# Patient Record
Sex: MALE | URBAN - METROPOLITAN AREA
[De-identification: names, ages, dates, MRNs, and addresses within clinical notes are randomized per-mention and may not be internally consistent; named-entity substitution may affect disease eponyms.]

---

## 2017-01-19 ENCOUNTER — OFFICE VISIT (OUTPATIENT)
Dept: FAMILY MEDICINE CLINIC | Age: 67
End: 2017-01-19

## 2017-01-19 VITALS
DIASTOLIC BLOOD PRESSURE: 80 MMHG | BODY MASS INDEX: 28.03 KG/M2 | HEIGHT: 66 IN | HEART RATE: 60 BPM | WEIGHT: 174.4 LBS | SYSTOLIC BLOOD PRESSURE: 140 MMHG | OXYGEN SATURATION: 96 % | TEMPERATURE: 97.1 F | RESPIRATION RATE: 16 BRPM

## 2017-01-19 DIAGNOSIS — E11.65 TYPE 2 DIABETES MELLITUS WITH HYPERGLYCEMIA, WITHOUT LONG-TERM CURRENT USE OF INSULIN (HCC): Primary | ICD-10-CM

## 2017-01-19 DIAGNOSIS — F17.200 SMOKING: ICD-10-CM

## 2017-01-19 DIAGNOSIS — E55.9 VITAMIN D DEFICIENCY: ICD-10-CM

## 2017-01-19 DIAGNOSIS — E05.90 SUBCLINICAL HYPERTHYROIDISM: ICD-10-CM

## 2017-01-19 DIAGNOSIS — I10 ESSENTIAL HYPERTENSION WITH GOAL BLOOD PRESSURE LESS THAN 130/85: ICD-10-CM

## 2017-01-19 DIAGNOSIS — Z23 NEED FOR 23-POLYVALENT PNEUMOCOCCAL POLYSACCHARIDE VACCINE: ICD-10-CM

## 2017-01-19 RX ORDER — BISOPROLOL FUMARATE AND HYDROCHLOROTHIAZIDE 10; 6.25 MG/1; MG/1
TABLET ORAL
Qty: 180 TAB | Refills: 3 | Status: SHIPPED | OUTPATIENT
Start: 2017-01-19 | End: 2018-01-04 | Stop reason: SDUPTHER

## 2017-01-19 RX ORDER — VARENICLINE TARTRATE 25 MG
KIT ORAL
Qty: 1 DOSE PACK | Refills: 0 | Status: SHIPPED | OUTPATIENT
Start: 2017-01-19 | End: 2017-07-05 | Stop reason: SDUPTHER

## 2017-01-19 NOTE — MR AVS SNAPSHOT
Visit Information Date & Time Provider Department Dept. Phone Encounter #  
 1/19/2017  9:15 AM Bonita Godoy MD [facility] [address] [id number] Follow-up Instructions Return in about 3 months (around 4/19/2017) for follow up. Upcoming Health Maintenance Date Due Pneumococcal 65+ Low/Medium Risk (2 of 2 - PPSV23) 10/15/2016 HEMOGLOBIN A1C Q6M 4/18/2017 EYE EXAM RETINAL OR DILATED Q1 4/28/2017 MICROALBUMIN Q1 7/18/2017 LIPID PANEL Q1 7/18/2017 MEDICARE YEARLY EXAM 1/20/2018 GLAUCOMA SCREENING Q2Y 4/28/2018 COLONOSCOPY 12/26/2019 DTaP/Tdap/Td series (2 - Td) 12/24/2024 Allergies as of 1/19/2017  Review Complete On: 1/19/2017 By: Bonita Godoy MD  
 No Known Allergies Current Immunizations  Reviewed on 1/19/2017 Name Date Influenza High Dose Vaccine PF 10/18/2016 Influenza Vaccine 12/2/2013 Influenza Vaccine [place] Going) 10/15/2015 Pneumococcal Conjugate (PCV-13) 10/15/2015 Pneumococcal Polysaccharide (PPSV-23)  Incomplete Tdap 12/24/2014 Zoster Vaccine, Live 1/29/2016 Reviewed by Bonita Godoy MD on 1/19/2017 at  9:57 AM  
You Were Diagnosed With   
  
 Codes Comments Type 2 diabetes mellitus with hyperglycemia, without long-term current use of insulin (HCC)    -  Primary ICD-10-CM: E11.65 ICD-9-CM: 250.00, 790.29 Essential hypertension with goal blood pressure less than 130/85     ICD-10-CM: I10 
ICD-9-CM: 401.9 Smoking     ICD-10-CM: F17.200 ICD-9-CM: 305.1 Vitamin D deficiency     ICD-10-CM: E55.9 ICD-9-CM: 268.9 Essential hypertension     ICD-10-CM: I10 
ICD-9-CM: 401.9 Subclinical hyperthyroidism     ICD-10-CM: E05.90 ICD-9-CM: 242.90 Need for 23-polyvalent pneumococcal polysaccharide vaccine     ICD-10-CM: N83 ICD-9-CM: V03.82 Vitals BP Pulse Temp Resp Height(growth percentile) Weight(growth percentile) 140/80 60 97.1 °F (36.2 °C) (Oral) 16 5' 6\" (1.676 m) 174 lb 6.4 oz (79.1 kg) SpO2 BMI Smoking Status 96% 28.15 kg/m2 Light Tobacco Smoker Vitals History BMI and BSA Data Body Mass Index Body Surface Area  
 28.15 kg/m 2 1.92 m 2 Preferred Pharmacy Pharmacy Name Phone 305 [facility], [address] [address] [place] [address] [address] Melvi Torres 134 Your Updated Medication List  
  
   
This list is accurate as of: 1/19/17 10:04 AM.  Always use your most recent med list. amLODIPine 5 mg tablet Commonly known as:  Arlyss Kyung Take 1 Tab by mouth daily. aspirin 81 mg chewable tablet Take 81 mg by mouth daily. bisoprolol-hydroCHLOROthiazide 10-6.25 mg per tablet Commonly known as:  [facility] Take 2 tablets by mouth  daily  
  
 ergocalciferol 50,000 unit capsule Commonly known as:  ERGOCALCIFEROL Take 1 Cap by mouth every seven (7) days. metFORMIN 500 mg tablet Commonly known as:  GLUCOPHAGE Take 1 Tab by mouth daily (with dinner). naproxen 500 mg tablet Commonly known as:  NAPROSYN Take 1 Tab by mouth two (2) times daily (with meals). varenicline 0.5 mg (11)- 1 mg (42) Dspk Commonly known as:  Chantix Use as directed in the pack Prescriptions Sent to Pharmacy Refills  
 bisoprolol-hydroCHLOROthiazide (ZIAC) 10-6.25 mg per tablet 3 Sig: Take 2 tablets by mouth  daily Class: Normal  
 Pharmacy: [facility] [address] Ph #: [phone]  
 varenicline (CHANTIX) 0.5 mg (11)- 1 mg (42) DsPk 0 Sig: Use as directed in the pack Class: Normal  
 Pharmacy: [facility] [address] Todd [address] Ph #: [phone] We Performed the Following PNEUMOCOCCAL POLYSACCHARIDE VACCINE, 23-VALENT, ADULT OR IMMUNOSUPPRESSED PT DOSE, [85994 CPT(R)] Follow-up Instructions Return in about 3 months (around 4/19/2017) for follow up. To-Do List   
 02/19/2017 Lab:  HEMOGLOBIN A1C WITH EAG   
  
 02/19/2017 Lab:  LIPID PANEL   
  
 02/19/2017 Lab:  TSH 3RD GENERATION   
  
 02/19/2017 Lab:  VITAMIN D, 25 HYDROXY Patient Instructions Learning About Diabetes Food Guidelines Your Care Instructions Meal planning is important to manage diabetes. It helps keep your blood sugar at a target level (which you set with your doctor). You don't have to eat special foods. You can eat what your family eats, including sweets once in a while. But you do have to pay attention to how often you eat and how much you eat of certain foods. You may want to work with a dietitian or a certified diabetes educator (CDE) to help you plan meals and snacks. A dietitian or CDE can also help you lose weight if that is one of your goals. What should you know about eating carbs? Managing the amount of carbohydrate (carbs) you eat is an important part of healthy meals when you have diabetes. Carbohydrate is found in many foods. · Learn which foods have carbs. And learn the amounts of carbs in different foods. ¨ Bread, cereal, pasta, and rice have about 15 grams of carbs in a serving. A serving is 1 slice of bread (1 ounce), ½ cup of cooked cereal, or 1/3 cup of cooked pasta or rice. ¨ Fruits have 15 grams of carbs in a serving. A serving is 1 small fresh fruit, such as an apple or orange; ½ of a banana; ½ cup of cooked or canned fruit; ½ cup of fruit juice; 1 cup of melon or raspberries; or 2 tablespoons of dried fruit. ¨ Milk and no-sugar-added yogurt have 15 grams of carbs in a serving. A serving is 1 cup of milk or 2/3 cup of no-sugar-added yogurt. ¨ Starchy vegetables have 15 grams of carbs in a serving. A serving is ½ cup of mashed potatoes or sweet potato; 1 cup winter squash; ½ of a small baked potato; ½ cup of cooked beans; or ½ cup cooked corn or green peas. · Learn how much carbs to eat each day and at each meal. A dietitian or CDE can teach you how to keep track of the amount of carbs you eat. This is called carbohydrate counting. · If you are not sure how to count carbohydrate grams, use the Plate Method to plan meals. It is a good, quick way to make sure that you have a balanced meal. It also helps you spread carbs throughout the day. ¨ Divide your plate by types of foods. Put non-starchy vegetables on half the plate, meat or other protein food on one-quarter of the plate, and a grain or starchy vegetable in the final quarter of the plate. To this you can add a small piece of fruit and 1 cup of milk or yogurt, depending on how many carbs you are supposed to eat at a meal. 
· Try to eat about the same amount of carbs at each meal. Do not \"save up\" your daily allowance of carbs to eat at one meal. 
· Proteins have very little or no carbs per serving. Examples of proteins are beef, chicken, turkey, fish, eggs, tofu, cheese, cottage cheese, and peanut butter. A serving size of meat is 3 ounces, which is about the size of a deck of cards. Examples of meat substitute serving sizes (equal to 1 ounce of meat) are 1/4 cup of cottage cheese, 1 egg, 1 tablespoon of peanut butter, and ½ cup of tofu. How can you eat out and still eat healthy? · Learn to estimate the serving sizes of foods that have carbohydrate. If you measure food at home, it will be easier to estimate the amount in a serving of restaurant food. · If the meal you order has too much carbohydrate (such as potatoes, corn, or baked beans), ask to have a low-carbohydrate food instead. Ask for a salad or green vegetables. · If you use insulin, check your blood sugar before and after eating out to help you plan how much to eat in the future. · If you eat more carbohydrate at a meal than you had planned, take a walk or do other exercise. This will help lower your blood sugar. What else should you know? · Limit saturated fat, such as the fat from meat and dairy products. This is a healthy choice because people who have diabetes are at higher risk of heart disease. So choose lean cuts of meat and nonfat or low-fat dairy products. Use olive or canola oil instead of butter or shortening when cooking. · Don't skip meals. Your blood sugar may drop too low if you skip meals and take insulin or certain medicines for diabetes. · Check with your doctor before you drink alcohol. Alcohol can cause your blood sugar to drop too low. Alcohol can also cause a bad reaction if you take certain diabetes medicines. Follow-up care is a key part of your treatment and safety. Be sure to make and go to all appointments, and call your doctor if you are having problems. It's also a good idea to know your test results and keep a list of the medicines you take. Where can you learn more? Go to http://dino-ajith.info/. Enter U727 in the search box to learn more about \"Learning About Diabetes Food Guidelines. \" Current as of: May 23, 2016 Content Version: 11.1 © 2746-7884 [employer]wise, [employer]orporated. Care instructions adapted under license by [employer] (which disclaims liability or warranty for this information). If you have questions about a medical condition or this instruction, always ask your healthcare professional. [address] any warranty or liability for your use of this information. Introducing [facility]! Dear Arthur Vanegas: Thank you for requesting a [employer] account. Our records indicate that you have previously registered for a [employer] account but its currently inactive. Please call our [employer] support line at [phone]. Additional Information If you have questions, please visit the Frequently Asked Questions section of the [employer] website at [email or web address]. Mbite/fg [email or web address] Remember, [employer] is NOT to be used for urgent needs.  For medical emergencies, dial 911. Now available from your iPhone and Android! Please provide this summary of care documentation to your next provider. Your primary care clinician is listed as Nahid Lui. If you have any questions after today's visit, please call [phone].

## 2017-01-19 NOTE — PATIENT INSTRUCTIONS

## 2017-01-19 NOTE — PROGRESS NOTES
Javi Comer is here today to follow up for chronic conditions. 1. Have you been to the ER, urgent care clinic since your last visit? Hospitalized since your last visit? No    2. Have you seen or consulted any other health care providers outside of the [address] [place] [address] since your last visit? Include any pap smears or colon screening.  No

## 2017-01-19 NOTE — PROGRESS NOTES
Chief Complaint   Patient presents with    Hypertension     Follow up    Diabetes    Vitamin D Deficiency    Osteoarthritis       Pt is a 77y.o. year old male who presents for follow up of his chronic medical problems    Vit D def  Lab Results   Component Value Date/Time    VITAMIN D, 25-HYDROXY 30.5 10/18/2016 11:08 AM    on rx-compliant     HTN  BP Readings from Last 3 Encounters:   01/19/17 140/80   10/18/16 140/80   07/18/16 132/80   repeat BP-better      Lab Results   Component Value Date/Time    TSH 0.18 10/18/2016 11:08 AM   Denies sxs of hyperthyroidism    DM  Wt Readings from Last 3 Encounters:   01/19/17 174 lb 6.4 oz (79.1 kg)   10/18/16 171 lb 3.2 oz (77.7 kg)   07/18/16 170 lb 9.6 oz (77.4 kg)     Lab Results   Component Value Date/Time    Hemoglobin A1c 7.0 10/18/2016 11:08 AM    Hemoglobin A1c (POC) 6.8 05/27/2014 11:22 AM     Lab Results   Component Value Date/Time    Glucose 125 07/18/2016 10:53 AM   Now on Metformin-denies any side effects    HYperlipidemia  Lab Results   Component Value Date/Time    Cholesterol, total 156 07/18/2016 10:53 AM    HDL Cholesterol 39 07/18/2016 10:53 AM    LDL, calculated 102 07/18/2016 10:53 AM    VLDL, calculated 15 07/18/2016 10:53 AM    Triglyceride 75 07/18/2016 10:53 AM    CHOL/HDL Ratio 4.1 07/20/2010 09:50 AM   Not fasting today    Smoking  Willing to try Chantix-has never tried any meds to help him quit  Per wife, he is smoking too much    ROS:    Pt denies: Wt loss, Fever/Chills, HA, Visual changes, Fatigue, Chest pain, SOB, SALMON, Abd pain, N/V/D/C, Blood in stool or urine, Edema. Pertinent positive as above in HPI.  All others were negative    Patient Active Problem List   Diagnosis Code    Smoking F17.200    Essential hypertension I10    Vitamin D deficiency E55.9    Subclinical hyperthyroidism E05.90    Type 2 diabetes mellitus with hyperglycemia, without long-term current use of insulin (HCC) E11.65    Primary osteoarthritis of left knee M17.12 Past Medical History   Diagnosis Date    Hypertension        Current Outpatient Prescriptions   Medication Sig Dispense Refill    bisoprolol-hydroCHLOROthiazide (ZIAC) 10-6.25 mg per tablet Take 2 tablets by mouth  daily 180 Tab 3    metFORMIN (GLUCOPHAGE) 500 mg tablet Take 1 Tab by mouth daily (with dinner). 90 Tab 3    ergocalciferol (ERGOCALCIFEROL) 50,000 unit capsule Take 1 Cap by mouth every seven (7) days. 12 Cap 1    amLODIPine (NORVASC) 5 mg tablet Take 1 Tab by mouth daily. 90 Tab 3    naproxen (NAPROSYN) 500 mg tablet Take 1 Tab by mouth two (2) times daily (with meals). 60 Tab 3    aspirin 81 mg chewable tablet Take 81 mg by mouth daily. History   Smoking Status    Light Tobacco Smoker   Smokeless Tobacco    Never Used       No Known Allergies    Patient Labs were reviewed: yes      Patient Past Records were reviewed:  yes        Objective:     Vitals:    01/19/17 0914 01/19/17 0954   BP: 152/87 140/80   Pulse: 60    Resp: 16    Temp: 97.1 °F (36.2 °C)    TempSrc: Oral    SpO2: 96%    Weight: 174 lb 6.4 oz (79.1 kg)    Height: 5' 6\" (1.676 m)      Body mass index is 28.15 kg/(m^2). Exam:   Appearance: alert, well appearing,  oriented to person, place, and time, acyanotic, in no respiratory distress and well hydrated. HEENT:  NC/AT, pink conj, anicteric sclerae  Neck:  No cervical lymphadenopathy, no JVD, no thyromegaly, no carotid bruit  Heart:  RRR without M/R/G  Lungs:  CTAB, no rhonchi, rales, or wheezes with good air exchange   Abdomen:  Non-tender, pos bowel sounds, no hepatosplenomegaly  Ext:  No C/C/E    Skin: no rash  Neuro: no lateralizing signs, CNs II-XII intact      Assessment/ Plan:   Cheryl Zhang was seen today for hypertension, diabetes, vitamin d deficiency and osteoarthritis.     Diagnoses and all orders for this visit:    Type 2 diabetes mellitus with hyperglycemia, without long-term current use of insulin (HCC)-new dx; advised regualr exercise and decrease carbs; continue with Metformin  -     HEMOGLOBIN A1C WITH EAG; Future  -     LIPID PANEL; Future    Essential hypertension with goal blood pressure less than 130/85-continue with present meds  -     bisoprolol-hydroCHLOROthiazide (ZIAC) 10-6.25 mg per tablet; Take 2 tablets by mouth  daily  -     LIPID PANEL; Future    Smoking-advised re possible side effects of this med  -     varenicline (CHANTIX) 0.5 mg (11)- 1 mg (42) DsPk; Use as directed in the pack    Vitamin D deficiency-on rx  -     VITAMIN D, 25 HYDROXY; Future    Subclinical hyperthyroidism-recheck TSH  -     TSH 3RD GENERATION; Future    Need for 23-polyvalent pneumococcal polysaccharide vaccine  -     PNEUMOCOCCAL POLYSACCHARIDE VACCINE, 23-VALENT, ADULT OR IMMUNOSUPPRESSED PT DOSE,        Follow-up Disposition:  Return in about 3 months (around 4/19/2017) for follow up. I have discussed the diagnosis with the patient and the intended plan as seen in the above orders. The patient has received an After-Visit Summary and questions were answered concerning future plans. Medication Side Effects and Warnings were discussed with patient: yes    Patient verbalized understanding of above instructions.     Abhijit Miranda MD  Internal Medicine  [facility]

## 2017-01-31 ENCOUNTER — TELEPHONE (OUTPATIENT)
Dept: FAMILY MEDICINE CLINIC | Age: 67
End: 2017-01-31

## 2017-01-31 NOTE — TELEPHONE ENCOUNTER
Called and spoke with Brisa Reyes. Verified two patient identifiers. Informed patient of lab results per provider. Made patient aware that his A1C is 7.0% and his LDL is elevated and we will recheck this at his next office visit. Patient verbalized understanding.

## 2017-02-19 DIAGNOSIS — E05.90 SUBCLINICAL HYPERTHYROIDISM: ICD-10-CM

## 2017-02-19 DIAGNOSIS — I10 ESSENTIAL HYPERTENSION WITH GOAL BLOOD PRESSURE LESS THAN 130/85: ICD-10-CM

## 2017-02-19 DIAGNOSIS — E11.65 TYPE 2 DIABETES MELLITUS WITH HYPERGLYCEMIA, WITHOUT LONG-TERM CURRENT USE OF INSULIN (HCC): ICD-10-CM

## 2017-02-19 DIAGNOSIS — E55.9 VITAMIN D DEFICIENCY: ICD-10-CM

## 2017-04-11 ENCOUNTER — OFFICE VISIT (OUTPATIENT)
Dept: FAMILY MEDICINE CLINIC | Age: 67
End: 2017-04-11

## 2017-04-11 VITALS
RESPIRATION RATE: 16 BRPM | TEMPERATURE: 98.4 F | SYSTOLIC BLOOD PRESSURE: 138 MMHG | HEIGHT: 66 IN | WEIGHT: 169 LBS | HEART RATE: 70 BPM | BODY MASS INDEX: 27.16 KG/M2 | DIASTOLIC BLOOD PRESSURE: 80 MMHG | OXYGEN SATURATION: 95 %

## 2017-04-11 DIAGNOSIS — E11.65 TYPE 2 DIABETES MELLITUS WITH HYPERGLYCEMIA, WITHOUT LONG-TERM CURRENT USE OF INSULIN (HCC): Primary | ICD-10-CM

## 2017-04-11 DIAGNOSIS — E55.9 VITAMIN D DEFICIENCY: ICD-10-CM

## 2017-04-11 DIAGNOSIS — M17.12 PRIMARY OSTEOARTHRITIS OF LEFT KNEE: ICD-10-CM

## 2017-04-11 DIAGNOSIS — F17.200 SMOKING: ICD-10-CM

## 2017-04-11 DIAGNOSIS — E05.90 SUBCLINICAL HYPERTHYROIDISM: ICD-10-CM

## 2017-04-11 DIAGNOSIS — I10 ESSENTIAL HYPERTENSION: ICD-10-CM

## 2017-04-11 RX ORDER — ERGOCALCIFEROL 1.25 MG/1
50000 CAPSULE ORAL
Qty: 12 CAP | Refills: 1 | Status: SHIPPED | OUTPATIENT
Start: 2017-04-11 | End: 2017-07-05 | Stop reason: SDUPTHER

## 2017-04-11 NOTE — MR AVS SNAPSHOT
Visit Information Date & Time Provider Department Dept. Phone Encounter #  
 4/11/2017 11:30 AM Peter Mendoza MD [address] [address] [id number] Follow-up Instructions Return in about 3 months (around 7/11/2017) for follow up. Upcoming Health Maintenance Date Due HEMOGLOBIN A1C Q6M 4/18/2017 EYE EXAM RETINAL OR DILATED Q1 4/28/2017 MICROALBUMIN Q1 7/18/2017 MEDICARE YEARLY EXAM 1/20/2018 LIPID PANEL Q1 1/21/2018 GLAUCOMA SCREENING Q2Y 4/28/2018 COLONOSCOPY 12/26/2019 DTaP/Tdap/Td series (2 - Td) 12/24/2024 Allergies as of 4/11/2017  Review Complete On: 4/11/2017 By: Peter Mendoza MD  
 No Known Allergies Current Immunizations  Reviewed on 1/19/2017 Name Date Influenza High Dose Vaccine PF 10/18/2016 Influenza Vaccine 12/2/2013 Influenza Vaccine [place]) 10/15/2015 Pneumococcal Conjugate (PCV-13) 10/15/2015 Pneumococcal Polysaccharide (PPSV-23) 1/19/2017 Tdap 12/24/2014 Zoster Vaccine, Live 1/29/2016 Not reviewed this visit You Were Diagnosed With   
  
 Codes Comments Type 2 diabetes mellitus with hyperglycemia, without long-term current use of insulin (HCC)    -  Primary ICD-10-CM: E11.65 ICD-9-CM: 250.00, 790.29 Essential hypertension     ICD-10-CM: I10 
ICD-9-CM: 401.9 Vitamin D deficiency     ICD-10-CM: E55.9 ICD-9-CM: 268.9 Primary osteoarthritis of left knee     ICD-10-CM: M17.12 
ICD-9-CM: 715.16 Subclinical hyperthyroidism     ICD-10-CM: E05.90 ICD-9-CM: 242.90 Smoking     ICD-10-CM: F17.200 ICD-9-CM: 305.1 Vitals BP Pulse Temp Resp Height(growth percentile) Weight(growth percentile) 138/80 70 98.4 °F (36.9 °C) (Oral) 16 5' 6\" (1.676 m) 169 lb (76.7 kg) SpO2 BMI Smoking Status 95% 27.28 kg/m2 Light Tobacco Smoker Vitals History BMI and BSA Data  Body Mass Index Body Surface Area  
 27.28 kg/m 2 1.89 m 2  
  
  
 Preferred Pharmacy Pharmacy Name Phone 305 [facility], [address] [address] [place] [address] [address] Melvi Nichols Your Updated Medication List  
  
   
This list is accurate as of: 4/11/17 12:20 PM.  Always use your most recent med list. amLODIPine 5 mg tablet Commonly known as:  [employer] Take 1 Tab by mouth daily. aspirin 81 mg chewable tablet Take 81 mg by mouth daily. bisoprolol-hydroCHLOROthiazide 10-6.25 mg per tablet Commonly known as:  [facility] \[facility]\"" Take 2 tablets by mouth  daily  
  
 ergocalciferol 50,000 unit capsule Commonly known as:  ERGOCALCIFEROL Take 1 Cap by mouth every seven (7) days. metFORMIN 500 mg tablet Commonly known as:  GLUCOPHAGE Take 1 Tab by mouth daily (with dinner). naproxen 500 mg tablet Commonly known as:  NAPROSYN Take 1 Tab by mouth two (2) times daily (with meals). varenicline 0.5 mg (11)- 1 mg (42) Dspk Commonly known as:  Chantix Use as directed in the pack Prescriptions Sent to Pharmacy Refills  
 ergocalciferol (ERGOCALCIFEROL) 50,000 unit capsule 1 Sig: Take 1 Cap by mouth every seven (7) days. Class: Normal  
 Pharmacy: [address]  #: [phone] Route: Oral  
  
Follow-up Instructions Return in about 3 months (around 7/11/2017) for follow up. To-Do List   
 05/11/2017 Lab:  HEMOGLOBIN A1C W/O EAG   
  
 05/11/2017 Lab:  LIPID PANEL   
  
 05/11/2017 Lab:  METABOLIC PANEL, COMPREHENSIVE   
  
 05/11/2017 Lab:  TSH 3RD GENERATION   
  
 05/11/2017 Lab:  VITAMIN D, 25 HYDROXY Patient Instructions Knee Arthritis: Exercises Your Care Instructions Here are some examples of exercises for knee arthritis. Start each exercise slowly. Ease off the exercise if you start to have pain.  
Your doctor or physical therapist will tell you when you can start these exercises and which ones will work best for you. How to do the exercises Knee flexion with heel slide 1. Lie on your back with your knees bent. 2. Slide your heel back by bending your affected knee as far as you can. Then hook your other foot around your ankle to help pull your heel even farther back. 3. Hold for about 6 seconds, then rest for up to 10 seconds. 4. Repeat 8 to 12 times. 5. Switch legs and repeat steps 1 through 4, even if only one knee is sore. [facility] [employer] 1. Sit with your affected leg straight and supported on the floor or a firm bed. Place a small, rolled-up towel under your knee. Your other leg should be bent, with that foot flat on the floor. 2. Tighten the thigh muscles of your affected leg by pressing the back of your knee down into the towel. 3. Hold for about 6 seconds, then rest for up to 10 seconds. 4. Repeat 8 to 12 times. 5. Switch legs and repeat steps 1 through 4, even if only one knee is sore. Straight-leg raises to the front 1. Lie on your back with your good knee bent so that your foot rests flat on the floor. Your affected leg should be straight. Make sure that your low back has a normal curve. You should be able to slip your hand in between the floor and the small of your back, with your palm touching the floor and your back touching the back of your hand. 2. Tighten the thigh muscles in your affected leg by pressing the back of your knee flat down to the floor. Hold your knee straight. 3. Keeping the thigh muscles tight and your leg straight, lift your affected leg up so that your heel is about 12 inches off the floor. Hold for about 6 seconds, then lower slowly. 4. Relax for up to 10 seconds between repetitions. 5. Repeat 8 to 12 times. 6. Switch legs and repeat steps 1 through 5, even if only one knee is sore. Active knee flexion 1. Lie on your stomach with your knees straight.  If your kneecap is uncomfortable, roll up a washcloth and put it under your leg just above your kneecap. 2. Lift the foot of your affected leg by bending the knee so that you bring the foot up toward your buttock. If this motion hurts, try it without bending your knee quite as far. This may help you avoid any painful motion. 3. Slowly move your leg up and down. 4. Repeat 8 to 12 times. 5. Switch legs and repeat steps 1 through 4, even if only one knee is sore. Quadriceps stretch (facedown) 1. Lie flat on your stomach, and rest your face on the floor. 2. Wrap a towel or belt strap around the lower part of your affected leg. Then use the towel or belt strap to slowly pull your heel toward your buttock until you feel a stretch. 3. Hold for about 15 to 30 seconds, then relax your leg against the towel or belt strap. 4. Repeat 2 to 4 times. 5. Switch legs and repeat steps 1 through 4, even if only one knee is sore. Stationary exercise bike If you do not have a stationary exercise bike at home, you can find one to ride at your local health club or community center. 1. Adjust the height of the bike seat so that your knee is slightly bent when your leg is extended downward. If your knee hurts when the pedal reaches the top, you can raise the seat so that your knee does not bend as much. 2. Start slowly. At first, try to do 5 to 10 minutes of cycling with little to no resistance. Then increase your time and the resistance bit by bit until you can do 20 to 30 minutes without pain. 3. If you start to have pain, rest your knee until your pain gets back to the level that is normal for you. Or cycle for less time or with less effort. Follow-up care is a key part of your treatment and safety. Be sure to make and go to all appointments, and call your doctor if you are having problems. It's also a good idea to know your test results and keep a list of the medicines you take. Where can you learn more? Go to http://dino-ajith.info/. Enter C159 in the search box to learn more about \"Knee Arthritis: Exercises. \" Current as of: May 23, 2016 Content Version: 11.2 © 7394-2382 [employer]. Care instructions adapted under license by [employer] (which disclaims liability or warranty for this information). If you have questions about a medical condition or this instruction, always ask your healthcare professional. [address] any warranty or liability for your use of this information. Introducing [facility]! Dear Demetrice Ascencio: Thank you for requesting a [employer] account. Our records indicate that you have previously registered for a [employer] account but its currently inactive. Please call our [employer] support line at [phone]. Additional Information If you have questions, please visit the Frequently Asked Questions section of the [employer] website at [email or web address] Remember, [employer] is NOT to be used for urgent needs. For medical emergencies, dial 911. Now available from your iPhone and Android! Please provide this summary of care documentation to your next provider. Your primary care clinician is listed as Riccardo Robles. If you have any questions after today's visit, please call [phone].

## 2017-04-11 NOTE — PROGRESS NOTES
Chief Complaint   Patient presents with    Hypertension     Follow up    Thyroid Problem    Diabetes    Vitamin D Deficiency    Osteoarthritis       Pt is a 77y.o. year old male who presents for follow up of his chronic medical problems    KNEE LIMITED LT4/18/2016  [facility]  Result Impression   IMPRESSION:    Moderate degenerative change suggestive of osteoarthritis   Has been having more pain recently; job requires him to go up and down ladders  Takes Naprosyn prn; uses EMU cream which helps    Wt Readings from Last 3 Encounters:   04/11/17 169 lb (76.7 kg)   01/19/17 174 lb 6.4 oz (79.1 kg)   10/18/16 171 lb 3.2 oz (77.7 kg)     A1C was 7% done 1/2017  BP Readings from Last 3 Encounters:   04/11/17 138/80   01/19/17 140/80   10/18/16 140/80     Lipid Complete Panel1/21/2017  [address]  Component Name Value Ref Range   Cholesterol 176 110-200 mg/dL   Triglyceride 109  mg/dL   HDL 40 40-59 mg/dL   LDL CALCULATION 115 (H) 50-99 mg/dL   VLDL CALCULATION 22      Last vit D was 23 done 1/2017-finished rx    Ins did not give him Chantix; still smoking    TSH sl dec at 0.23 on 1/2017      ROS:    Pt denies: Wt loss, Fever/Chills, HA, Visual changes, Fatigue, Chest pain, SOB, SALMON, Abd pain, N/V/D/C, Blood in stool or urine, Edema. Pertinent positive as above in HPI. All others were negative    Patient Active Problem List   Diagnosis Code    Smoking F17.200    Essential hypertension I10    Vitamin D deficiency E55.9    Subclinical hyperthyroidism E05.90    Type 2 diabetes mellitus with hyperglycemia, without long-term current use of insulin (HCC) E11.65    Primary osteoarthritis of left knee M17.12       Past Medical History:   Diagnosis Date    Hypertension        Current Outpatient Prescriptions   Medication Sig Dispense Refill    ergocalciferol (ERGOCALCIFEROL) 50,000 unit capsule Take 1 Cap by mouth every seven (7) days.  12 Cap 1    bisoprolol-hydroCHLOROthiazide (ZIAC) 10-6.25 mg per tablet Take 2 tablets by mouth  daily 180 Tab 3    metFORMIN (GLUCOPHAGE) 500 mg tablet Take 1 Tab by mouth daily (with dinner). 90 Tab 3    amLODIPine (NORVASC) 5 mg tablet Take 1 Tab by mouth daily. 90 Tab 3    naproxen (NAPROSYN) 500 mg tablet Take 1 Tab by mouth two (2) times daily (with meals). 60 Tab 3    aspirin 81 mg chewable tablet Take 81 mg by mouth daily.  varenicline (CHANTIX) 0.5 mg (11)- 1 mg (42) DsPk Use as directed in the pack 1 Dose Pack 0       History   Smoking Status    Light Tobacco Smoker   Smokeless Tobacco    Never Used       No Known Allergies    Patient Labs were reviewed: yes      Patient Past Records were reviewed:  yes        Objective:     Vitals:    04/11/17 1132   BP: 138/80   Pulse: 70   Resp: 16   Temp: 98.4 °F (36.9 °C)   TempSrc: Oral   SpO2: 95%   Weight: 169 lb (76.7 kg)   Height: 5' 6\" (1.676 m)     Body mass index is 27.28 kg/(m^2). Exam:   Appearance: alert, well appearing,  oriented to person, place, and time, acyanotic, in no respiratory distress and well hydrated. HEENT:  NC/AT, pink conj, anicteric sclerae  Neck:  No cervical lymphadenopathy, no JVD, no thyromegaly, no carotid bruit  Heart:  RRR without M/R/G  Lungs:  CTAB, no rhonchi, rales, or wheezes with good air exchange   Abdomen:  Non-tender, pos bowel sounds, no hepatosplenomegaly  Ext:  No C/C/E, crepitus on the left knee   Skin: no rash  Neuro: no lateralizing signs, CNs II-XII intact      Assessment/ Plan:   Anjelica Strong was seen today for hypertension, thyroid problem, diabetes, vitamin d deficiency and osteoarthritis. Diagnoses and all orders for this visit:    Type 2 diabetes mellitus with hyperglycemia, without long-term current use of insulin ([address]  -     HEMOGLOBIN A1C W/O EAG; Future  -     METABOLIC PANEL, COMPREHENSIVE; Future    Essential hypertension  -     LIPID PANEL; Future  -     METABOLIC PANEL, COMPREHENSIVE;  Future    Vitamin D deficiency  -     ergocalciferol (ERGOCALCIFEROL) 50,000 unit capsule; Take 1 Cap by mouth every seven (7) days. -     VITAMIN D, 25 HYDROXY; Future    Primary osteoarthritis of left knee-pain worsening recently  -     REFERRAL TO ORTHOPEDIC SURGERY    Subclinical hyperthyroidism  -     TSH 3RD GENERATION; Future    Smoking-will follow up on the Chantix pre-auth        Follow-up Disposition:  Return in about 3 months (around 7/11/2017) for follow up. I have discussed the diagnosis with the patient and the intended plan as seen in the above orders. The patient has received an After-Visit Summary and questions were answered concerning future plans. Medication Side Effects and Warnings were discussed with patient: yes    Patient verbalized understanding of above instructions.     Esvin Rao MD  Internal Medicine  [facility]

## 2017-04-11 NOTE — PATIENT INSTRUCTIONS
Knee Arthritis: Exercises  Your Care Instructions  Here are some examples of exercises for knee arthritis. Start each exercise slowly. Ease off the exercise if you start to have pain. Your doctor or physical therapist will tell you when you can start these exercises and which ones will work best for you. How to do the exercises  Knee flexion with heel slide    1. Lie on your back with your knees bent. 2. Slide your heel back by bending your affected knee as far as you can. Then hook your other foot around your ankle to help pull your heel even farther back. 3. Hold for about 6 seconds, then rest for up to 10 seconds. 4. Repeat 8 to 12 times. 5. Switch legs and repeat steps 1 through 4, even if only one knee is sore. Quad sets    1. Sit with your affected leg straight and supported on the floor or a firm bed. Place a small, rolled-up towel under your knee. Your other leg should be bent, with that foot flat on the floor. 2. Tighten the thigh muscles of your affected leg by pressing the back of your knee down into the towel. 3. Hold for about 6 seconds, then rest for up to 10 seconds. 4. Repeat 8 to 12 times. 5. Switch legs and repeat steps 1 through 4, even if only one knee is sore. Straight-leg raises to the front    1. Lie on your back with your good knee bent so that your foot rests flat on the floor. Your affected leg should be straight. Make sure that your low back has a normal curve. You should be able to slip your hand in between the floor and the small of your back, with your palm touching the floor and your back touching the back of your hand. 2. Tighten the thigh muscles in your affected leg by pressing the back of your knee flat down to the floor. Hold your knee straight. 3. Keeping the thigh muscles tight and your leg straight, lift your affected leg up so that your heel is about 12 inches off the floor. Hold for about 6 seconds, then lower slowly.   4. Relax for up to 10 seconds between repetitions. 5. Repeat 8 to 12 times. 6. Switch legs and repeat steps 1 through 5, even if only one knee is sore. Active knee flexion    1. Lie on your stomach with your knees straight. If your kneecap is uncomfortable, roll up a washcloth and put it under your leg just above your kneecap. 2. Lift the foot of your affected leg by bending the knee so that you bring the foot up toward your buttock. If this motion hurts, try it without bending your knee quite as far. This may help you avoid any painful motion. 3. Slowly move your leg up and down. 4. Repeat 8 to 12 times. 5. Switch legs and repeat steps 1 through 4, even if only one knee is sore. Quadriceps stretch (facedown)    1. Lie flat on your stomach, and rest your face on the floor. 2. Wrap a towel or belt strap around the lower part of your affected leg. Then use the towel or belt strap to slowly pull your heel toward your buttock until you feel a stretch. 3. Hold for about 15 to 30 seconds, then relax your leg against the towel or belt strap. 4. Repeat 2 to 4 times. 5. Switch legs and repeat steps 1 through 4, even if only one knee is sore. Stationary exercise bike    If you do not have a stationary exercise bike at home, you can find one to ride at your local health club or community center. 1. Adjust the height of the bike seat so that your knee is slightly bent when your leg is extended downward. If your knee hurts when the pedal reaches the top, you can raise the seat so that your knee does not bend as much. 2. Start slowly. At first, try to do 5 to 10 minutes of cycling with little to no resistance. Then increase your time and the resistance bit by bit until you can do 20 to 30 minutes without pain. 3. If you start to have pain, rest your knee until your pain gets back to the level that is normal for you. Or cycle for less time or with less effort. Follow-up care is a key part of your treatment and safety.  Be sure to make and go to all appointments, and call your doctor if you are having problems. It's also a good idea to know your test results and keep a list of the medicines you take. Where can you learn more? Go to http://dino-ajith.info/. Enter C159 in the search box to learn more about \"Knee Arthritis: Exercises. \"  Current as of: May 23, 2016  Content Version: 11.2  © 0831-3769 [employer]. Care instructions adapted under license by [employer] (which disclaims liability or warranty for this information). If you have questions about a medical condition or this instruction, always ask your healthcare professional. [address] any warranty or liability for your use of this information.

## 2017-04-11 NOTE — PROGRESS NOTES
Radha Rodriguez is here today to follow up for chronic conditions. 1. Have you been to the ER, urgent care clinic since your last visit? Hospitalized since your last visit? No    2. Have you seen or consulted any other health care providers outside of the [address] [place] [address] since your last visit? Include any pap smears or colon screening.  No

## 2017-05-11 DIAGNOSIS — E55.9 VITAMIN D DEFICIENCY: ICD-10-CM

## 2017-05-11 DIAGNOSIS — E11.65 TYPE 2 DIABETES MELLITUS WITH HYPERGLYCEMIA, WITHOUT LONG-TERM CURRENT USE OF INSULIN (HCC): ICD-10-CM

## 2017-05-11 DIAGNOSIS — E05.90 SUBCLINICAL HYPERTHYROIDISM: ICD-10-CM

## 2017-05-11 DIAGNOSIS — I10 ESSENTIAL HYPERTENSION: ICD-10-CM

## 2017-07-05 ENCOUNTER — OFFICE VISIT (OUTPATIENT)
Dept: FAMILY MEDICINE CLINIC | Age: 67
End: 2017-07-05

## 2017-07-05 VITALS
SYSTOLIC BLOOD PRESSURE: 162 MMHG | WEIGHT: 168.2 LBS | BODY MASS INDEX: 27.03 KG/M2 | RESPIRATION RATE: 16 BRPM | DIASTOLIC BLOOD PRESSURE: 81 MMHG | TEMPERATURE: 97.3 F | OXYGEN SATURATION: 100 % | HEART RATE: 61 BPM | HEIGHT: 66 IN

## 2017-07-05 DIAGNOSIS — M17.12 PRIMARY OSTEOARTHRITIS OF LEFT KNEE: ICD-10-CM

## 2017-07-05 DIAGNOSIS — F17.200 SMOKING: ICD-10-CM

## 2017-07-05 DIAGNOSIS — E11.65 TYPE 2 DIABETES MELLITUS WITH HYPERGLYCEMIA, WITHOUT LONG-TERM CURRENT USE OF INSULIN (HCC): Primary | ICD-10-CM

## 2017-07-05 DIAGNOSIS — E05.90 SUBCLINICAL HYPERTHYROIDISM: ICD-10-CM

## 2017-07-05 DIAGNOSIS — E78.5 HYPERLIPIDEMIA, UNSPECIFIED HYPERLIPIDEMIA TYPE: ICD-10-CM

## 2017-07-05 DIAGNOSIS — E55.9 VITAMIN D DEFICIENCY: ICD-10-CM

## 2017-07-05 DIAGNOSIS — I10 ESSENTIAL HYPERTENSION WITH GOAL BLOOD PRESSURE LESS THAN 130/85: ICD-10-CM

## 2017-07-05 RX ORDER — AMLODIPINE BESYLATE 5 MG/1
5 TABLET ORAL DAILY
Qty: 90 TAB | Refills: 3 | Status: SHIPPED | OUTPATIENT
Start: 2017-07-05 | End: 2018-07-27 | Stop reason: SDUPTHER

## 2017-07-05 RX ORDER — ERGOCALCIFEROL 1.25 MG/1
50000 CAPSULE ORAL
Qty: 12 CAP | Refills: 1 | Status: SHIPPED | OUTPATIENT
Start: 2017-07-05 | End: 2018-02-20

## 2017-07-05 RX ORDER — METFORMIN HYDROCHLORIDE 500 MG/1
500 TABLET ORAL
Qty: 90 TAB | Refills: 3 | Status: SHIPPED | OUTPATIENT
Start: 2017-07-05 | End: 2018-07-19 | Stop reason: SDUPTHER

## 2017-07-05 RX ORDER — VARENICLINE TARTRATE 25 MG
KIT ORAL
Qty: 1 DOSE PACK | Refills: 0 | Status: SHIPPED | OUTPATIENT
Start: 2017-07-05 | End: 2017-11-22

## 2017-07-05 NOTE — PROGRESS NOTES
1. Have you been to the ER, urgent care clinic since your last visit? Hospitalized since your last visit? No    2. Have you seen or consulted any other health care providers outside of the [address] [place] [address] since your last visit? Include any pap smears or colon screening. No       Patient here for 3 month follow-up.     Patient has an appt with his eye doctor next week    Patient needs a referral to orthopedics for his knee pain

## 2017-07-05 NOTE — PROGRESS NOTES
Chief Complaint   Patient presents with    Follow-up      3 month       Pt is a 77y.o. year old male who presents for follow up of his chronic medical problems    Health Maintenance Due   Topic Date Due    HEMOGLOBIN A1C Q6M  04/18/2017-today    EYE EXAM RETINAL OR DILATED Q1  04/28/2017-scheduled next week downstairs    MICROALBUMIN Q1  07/18/2017-today     BP Readings from Last 3 Encounters:   07/05/17 162/81   04/11/17 138/80   01/19/17 140/80   Repeat BP- still elevated    Lab Results   Component Value Date/Time    Hemoglobin A1c 7.0 10/18/2016 11:08 AM    Hemoglobin A1c (POC) 6.8 05/27/2014 11:22 AM   Denies polyuria, polydipsia and polyphagia  6.9% on 4/2017 on the [employer] website    Lab Results   Component Value Date/Time    VITAMIN D, 25-HYDROXY 30.5 10/18/2016 11:08 AM     On rx    Still smoking? Yes, insurance did not cover Chantix      Left knee pain-still needs to see ortho, worse with movement, cannot bend  KNEE LIMITED LT4/18/2016  [facility]  Result Impression   IMPRESSION:    Moderate degenerative change suggestive of osteoarthritis      done 4/2017    ROS:    Pt denies: Wt loss, Fever/Chills, HA, Visual changes, Fatigue, Chest pain, SOB, SALMON, Abd pain, N/V/D/C, Blood in stool or urine, Edema. Pertinent positive as above in HPI. All others were negative    Patient Active Problem List   Diagnosis Code    Smoking F17.200    Essential hypertension I10    Vitamin D deficiency E55.9    Subclinical hyperthyroidism E05.90    Type 2 diabetes mellitus with hyperglycemia, without long-term current use of insulin (HCC) E11.65    Primary osteoarthritis of left knee M17.12       Past Medical History:   Diagnosis Date    Hypertension        Current Outpatient Prescriptions   Medication Sig Dispense Refill    metFORMIN (GLUCOPHAGE) 500 mg tablet Take 1 Tab by mouth daily (with dinner). 90 Tab 3    amLODIPine (NORVASC) 5 mg tablet Take 1 Tab by mouth daily.  90 Tab 3    ergocalciferol (ERGOCALCIFEROL) 50,000 unit capsule Take 1 Cap by mouth every seven (7) days. 12 Cap 1    varenicline (CHANTIX) 0.5 mg (11)- 1 mg (42) DsPk Use as directed in the pack 1 Dose Pack 0    bisoprolol-hydroCHLOROthiazide (ZIAC) 10-6.25 mg per tablet Take 2 tablets by mouth  daily 180 Tab 3    aspirin 81 mg chewable tablet Take 81 mg by mouth daily.  naproxen (NAPROSYN) 500 mg tablet Take 1 Tab by mouth two (2) times daily (with meals). 60 Tab 3       History   Smoking Status    Light Tobacco Smoker   Smokeless Tobacco    Never Used       No Known Allergies    Patient Labs were reviewed: yes      Patient Past Records were reviewed:  yes        Objective:     Vitals:    07/05/17 1008   BP: 162/81   Pulse: 61   Resp: 16   Temp: 97.3 °F (36.3 °C)   TempSrc: Oral   SpO2: 100%   Weight: 168 lb 3.2 oz (76.3 kg)   Height: 5' 6\" (1.676 m)     Body mass index is 27.15 kg/(m^2). Exam:   Appearance: alert, well appearing,  oriented to person, place, and time, acyanotic, in no respiratory distress and well hydrated. HEENT:  NC/AT, pink conj, anicteric sclerae  Neck:  No cervical lymphadenopathy, no JVD, no thyromegaly, no carotid bruit  Heart:  RRR without M/R/G  Lungs:  CTAB, no rhonchi, rales, or wheezes with good air exchange   Abdomen:  Non-tender, pos bowel sounds, no hepatosplenomegaly  Ext:  No C/C/E, crepitus on both knees; antalgic gait coming off from sitting position    Skin: no rash  Neuro: no lateralizing signs, CNs II-XII intact      Assessment/ Plan:   Andrew Willson was seen today for follow-up. Diagnoses and all orders for this visit:    Type 2 diabetes mellitus with hyperglycemia, without long-term current use of insulin (HCC)-at goal, continue with Metformin  -     metFORMIN (GLUCOPHAGE) 500 mg tablet; Take 1 Tab by mouth daily (with dinner). -     HEMOGLOBIN A1C W/O EAG;  Future  microalb next visit-we did not have strips to check this today    Essential hypertension with goal blood pressure less than 130/85-uncontrolled, will add Norvasc  -     amLODIPine (NORVASC) 5 mg tablet; Take 1 Tab by mouth daily.  -     CBC WITH AUTOMATED DIFF; Future  -     METABOLIC PANEL, COMPREHENSIVE; Future      Vitamin D deficiency-continue with rx, advised sun exposure  -     ergocalciferol (ERGOCALCIFEROL) 50,000 unit capsule; Take 1 Cap by mouth every seven (7) days. -     VITAMIN D, 25 HYDROXY; Future      Subclinical hyperthyroidism  -     TSH 3RD GENERATION; Future      Primary osteoarthritis of left knee  -     REFERRAL TO ORTHOPEDICS    Smoking  -     varenicline (CHANTIX) 0.5 mg (11)- 1 mg (42) DsPk; Use as directed in the pack    Hyperlipidemia, unspecified hyperlipidemia type-LDL has to be less than 100 because of DM        Follow-up Disposition:  Return in about 3 months (around 10/5/2017) for follow up. I have discussed the diagnosis with the patient and the intended plan as seen in the above orders. The patient has received an After-Visit Summary and questions were answered concerning future plans. Medication Side Effects and Warnings were discussed with patient: yes    Patient verbalized understanding of above instructions.     Tosha Davila MD  Internal Medicine  [facility]

## 2017-07-05 NOTE — MR AVS SNAPSHOT
Visit Information Date & Time Provider Department Dept. Phone Encounter #  
 7/5/2017 10:15 AM [facility] [id number] Your Appointments 1/22/2018 10:00 AM  
Office Visit with Gagan Parham MD  
Marcelina Moreno ([address]) Appt Note: mwv  
 [address]  
  
   
 [address] Upcoming Health Maintenance Date Due HEMOGLOBIN A1C Q6M 4/18/2017 EYE EXAM RETINAL OR DILATED Q1 4/28/2017 MICROALBUMIN Q1 7/18/2017 INFLUENZA AGE 9 TO ADULT 8/1/2017 MEDICARE YEARLY EXAM 1/20/2018 LIPID PANEL Q1 1/21/2018 GLAUCOMA SCREENING Q2Y 4/28/2018 COLONOSCOPY 12/26/2019 DTaP/Tdap/Td series (2 - Td) 12/24/2024 Allergies as of 7/5/2017  Review Complete On: 7/5/2017 By: Gagan Parham MD  
 No Known Allergies Current Immunizations  Reviewed on 1/19/2017 Name Date Influenza High Dose Vaccine PF 10/18/2016 Influenza Vaccine 12/2/2013 Influenza Vaccine Michell Blight) 10/15/2015 Pneumococcal Conjugate (PCV-13) 10/15/2015 Pneumococcal Polysaccharide (PPSV-23) 1/19/2017 Tdap 12/24/2014 Zoster Vaccine, Live 1/29/2016 Not reviewed this visit You Were Diagnosed With   
  
 Codes Comments Subclinical hyperthyroidism    -  Primary ICD-10-CM: E05.90 ICD-9-CM: 242.90 Type 2 diabetes mellitus with hyperglycemia, without long-term current use of insulin (HCC)     ICD-10-CM: E11.65 ICD-9-CM: 250.00, 790.29 Essential hypertension with goal blood pressure less than 130/85     ICD-10-CM: I10 
ICD-9-CM: 401.9 Vitamin D deficiency     ICD-10-CM: E55.9 ICD-9-CM: 268.9 Primary osteoarthritis of left knee     ICD-10-CM: M17.12 
ICD-9-CM: 715.16 Smoking     ICD-10-CM: F17.200 ICD-9-CM: 305.1 Vitals BP Pulse Temp Resp Height(growth percentile) Weight(growth percentile) 162/81 61 97.3 °F (36.3 °C) (Oral) 16 5' 6\" (1.676 m) 168 lb 3.2 oz (76.3 kg) SpO2 BMI Smoking Status 100% 27.15 kg/m2 Light Tobacco Smoker BMI and BSA Data Body Mass Index Body Surface Area  
 27.15 kg/m 2 1.88 m 2 Preferred Pharmacy Pharmacy Name Phone [id number] [address] [address] [phone] Your Updated Medication List  
  
   
This list is accurate as of: 7/5/17 11:00 AM.  Always use your most recent med list. amLODIPine 5 mg tablet Commonly known as:  Orval Nisha Take 1 Tab by mouth daily. aspirin 81 mg chewable tablet Take 81 mg by mouth daily. bisoprolol-hydroCHLOROthiazide 10-6.25 mg per tablet Commonly known as:  [facility] Take 2 tablets by mouth  daily  
  
 ergocalciferol 50,000 unit capsule Commonly known as:  ERGOCALCIFEROL Take 1 Cap by mouth every seven (7) days. metFORMIN 500 mg tablet Commonly known as:  GLUCOPHAGE Take 1 Tab by mouth daily (with dinner). naproxen 500 mg tablet Commonly known as:  NAPROSYN Take 1 Tab by mouth two (2) times daily (with meals). varenicline 0.5 mg (11)- 1 mg (42) Dspk Commonly known as:  Chantix Use as directed in the pack Prescriptions Sent to Pharmacy Refills  
 metFORMIN (GLUCOPHAGE) 500 mg tablet 3 Sig: Take 1 Tab by mouth daily (with dinner). Class: Normal  
 Pharmacy: [facility] [address] Ph #: [phone] Route: Oral  
 amLODIPine (NORVASC) 5 mg tablet 3 Sig: Take 1 Tab by mouth daily. Class: Normal  
 Pharmacy: [facility] [address] Ph #: [phone] Route: Oral  
 ergocalciferol (ERGOCALCIFEROL) 50,000 unit capsule 1 Sig: Take 1 Cap by mouth every seven (7) days. Class: Normal  
 Pharmacy: [facility] [address] Ph #: [phone] Route: Oral  
 varenicline (CHANTIX) 0.5 mg (11)- 1 mg (42) DsPk 0 Sig: Use as directed in the pack Class: Normal  
 Pharmacy: [facility] [id number] [address] [address] Ph #: [phone] We Performed the Following AMB POC URINE, MICROALBUMIN, SEMIQUANTITATIVE [74844 CPT(R)] REFERRAL TO ORTHOPEDICS [XWE521 Custom] Comments:  
 Please evaluate patient for left knee OA To-Do List   
 07/05/2017 Lab:  CBC WITH AUTOMATED DIFF   
  
 07/05/2017 Lab:  HEMOGLOBIN A1C W/O EAG   
  
 07/05/2017 Lab:  METABOLIC PANEL, COMPREHENSIVE   
  
 07/05/2017 Lab:  TSH 3RD GENERATION   
  
 07/05/2017 Lab:  VITAMIN D, 25 HYDROXY Referral Information Referral ID Referred By Referred To  
  
 [id number] Paxton Cardenas Not Available Visits Status Start Date End Date 1 New Request 7/5/17 7/5/18 If your referral has a status of pending review or denied, additional information will be sent to support the outcome of this decision. Patient Instructions Learning About Diabetes Food Guidelines Your Care Instructions Meal planning is important to manage diabetes. It helps keep your blood sugar at a target level (which you set with your doctor). You don't have to eat special foods. You can eat what your family eats, including sweets once in a while. But you do have to pay attention to how often you eat and how much you eat of certain foods. You may want to work with a dietitian or a certified diabetes educator (CDE) to help you plan meals and snacks. A dietitian or CDE can also help you lose weight if that is one of your goals. What should you know about eating carbs? Managing the amount of carbohydrate (carbs) you eat is an important part of healthy meals when you have diabetes. Carbohydrate is found in many foods. · Learn which foods have carbs. And learn the amounts of carbs in different foods. ¨ Bread, cereal, pasta, and rice have about 15 grams of carbs in a serving. A serving is 1 slice of bread (1 ounce), ½ cup of cooked cereal, or 1/3 cup of cooked pasta or rice. ¨ Fruits have 15 grams of carbs in a serving. A serving is 1 small fresh fruit, such as an apple or orange; ½ of a banana; ½ cup of cooked or canned fruit; ½ cup of fruit juice; 1 cup of melon or raspberries; or 2 tablespoons of dried fruit. ¨ Milk and no-sugar-added yogurt have 15 grams of carbs in a serving. A serving is 1 cup of milk or 2/3 cup of no-sugar-added yogurt. ¨ Starchy vegetables have 15 grams of carbs in a serving. A serving is ½ cup of mashed potatoes or sweet potato; 1 cup winter squash; ½ of a small baked potato; ½ cup of cooked beans; or ½ cup cooked corn or green peas. · Learn how much carbs to eat each day and at each meal. A dietitian or CDE can teach you how to keep track of the amount of carbs you eat. This is called carbohydrate counting. · If you are not sure how to count carbohydrate grams, use the Plate Method to plan meals. It is a good, quick way to make sure that you have a balanced meal. It also helps you spread carbs throughout the day. ¨ Divide your plate by types of foods. Put non-starchy vegetables on half the plate, meat or other protein food on one-quarter of the plate, and a grain or starchy vegetable in the final quarter of the plate. To this you can add a small piece of fruit and 1 cup of milk or yogurt, depending on how many carbs you are supposed to eat at a meal. 
· Try to eat about the same amount of carbs at each meal. Do not \"save up\" your daily allowance of carbs to eat at one meal. 
· Proteins have very little or no carbs per serving. Examples of proteins are beef, chicken, turkey, fish, eggs, tofu, cheese, cottage cheese, and peanut butter. A serving size of meat is 3 ounces, which is about the size of a deck of cards.  Examples of meat substitute serving sizes (equal to 1 ounce of meat) are 1/4 cup of cottage cheese, 1 egg, 1 tablespoon of peanut butter, and ½ cup of tofu. How can you eat out and still eat healthy? · Learn to estimate the serving sizes of foods that have carbohydrate. If you measure food at home, it will be easier to estimate the amount in a serving of restaurant food. · If the meal you order has too much carbohydrate (such as potatoes, corn, or baked beans), ask to have a low-carbohydrate food instead. Ask for a salad or green vegetables. · If you use insulin, check your blood sugar before and after eating out to help you plan how much to eat in the future. · If you eat more carbohydrate at a meal than you had planned, take a walk or do other exercise. This will help lower your blood sugar. What else should you know? · Limit saturated fat, such as the fat from meat and dairy products. This is a healthy choice because people who have diabetes are at higher risk of heart disease. So choose lean cuts of meat and nonfat or low-fat dairy products. Use olive or canola oil instead of butter or shortening when cooking. · Don't skip meals. Your blood sugar may drop too low if you skip meals and take insulin or certain medicines for diabetes. · Check with your doctor before you drink alcohol. Alcohol can cause your blood sugar to drop too low. Alcohol can also cause a bad reaction if you take certain diabetes medicines. Follow-up care is a key part of your treatment and safety. Be sure to make and go to all appointments, and call your doctor if you are having problems. It's also a good idea to know your test results and keep a list of the medicines you take. Where can you learn more? Go to http://dino-ajith.info/. Enter P507 in the search box to learn more about \"Learning About Diabetes Food Guidelines. \" Current as of: March 13, 2017 Content Version: 11.3 © 7860-7665 [employer].  Care instructions adapted under license by [address] (which disclaims liability or warranty for this information). If you have questions about a medical condition or this instruction, always ask your healthcare professional. [address] any warranty or liability for your use of this information. Introducing [facility]! Dear Tanya Madison: Thank you for requesting a [employer] account. Our records indicate that you have previously registered for a [employer] account but its currently inactive. Please call our [employer] support line at [phone]. Additional Information If you have questions, please visit the Frequently Asked Questions section of the [employer] website at [email or web address] Remember, [employer] is NOT to be used for urgent needs. For medical emergencies, dial 911. Now available from your iPhone and Android! Please provide this summary of care documentation to your next provider. Your primary care clinician is listed as Dwain Mendoza. If you have any questions after today's visit, please call [phone].

## 2017-07-05 NOTE — PATIENT INSTRUCTIONS

## 2017-07-06 LAB
25(OH)D3 SERPL-MCNC: 34.8 NG/ML (ref 32–100)
A-G RATIO,AGRAT: 1.5 RATIO (ref 1.1–2.6)
ABSOLUTE LYMPHOCYTE COUNT, 10803: 2.1 K/UL (ref 1–4.8)
ALBUMIN SERPL-MCNC: 4.5 G/DL (ref 3.5–5)
ALP SERPL-CCNC: 100 U/L (ref 40–125)
ALT SERPL-CCNC: 33 U/L (ref 5–40)
ANION GAP SERPL CALC-SCNC: 17 MMOL/L
AST SERPL W P-5'-P-CCNC: 24 U/L (ref 10–37)
AVG GLU, 10930: 151 MG/DL (ref 91–123)
BASOPHILS # BLD: 0 K/UL (ref 0–0.2)
BASOPHILS NFR BLD: 1 % (ref 0–2)
BILIRUB SERPL-MCNC: 0.2 MG/DL (ref 0.2–1.2)
BUN SERPL-MCNC: 14 MG/DL (ref 6–22)
CALCIUM SERPL-MCNC: 9.7 MG/DL (ref 8.4–10.4)
CHLORIDE SERPL-SCNC: 101 MMOL/L (ref 98–110)
CO2 SERPL-SCNC: 23 MMOL/L (ref 20–32)
CREAT SERPL-MCNC: 0.9 MG/DL (ref 0.8–1.6)
EOSINOPHIL # BLD: 0.3 K/UL (ref 0–0.5)
EOSINOPHIL NFR BLD: 4 % (ref 0–6)
ERYTHROCYTE [DISTWIDTH] IN BLOOD BY AUTOMATED COUNT: 15.3 % (ref 10–16)
GFRAA, 66117: >60
GFRNA, 66118: >60
GLOBULIN,GLOB: 3.1 G/DL (ref 2–4)
GLUCOSE SERPL-MCNC: 126 MG/DL (ref 65–99)
GRANULOCYTES,GRANS: 60 % (ref 40–75)
HBA1C MFR BLD HPLC: 6.9 % (ref 4.8–5.9)
HCT VFR BLD AUTO: 46.3 % (ref 37.8–52.2)
HGB BLD-MCNC: 15.5 G/DL (ref 12.6–17.1)
LYMPHOCYTES, LYMLT: 28 % (ref 27–45)
MCH RBC QN AUTO: 33 PG (ref 26–34)
MCHC RBC AUTO-ENTMCNC: 34 G/DL (ref 32–36)
MCV RBC AUTO: 99 FL (ref 80–95)
MONOCYTES # BLD: 0.6 K/UL (ref 0.1–0.9)
MONOCYTES NFR BLD: 8 % (ref 3–9)
NEUTROPHILS # BLD AUTO: 4.5 K/UL (ref 1.8–7.7)
PLATELET # BLD AUTO: 182 K/UL (ref 140–440)
PMV BLD AUTO: 11.4 FL (ref 6–10.8)
POTASSIUM SERPL-SCNC: 5.6 MMOL/L (ref 3.5–5.5)
PROT SERPL-MCNC: 7.6 G/DL (ref 6.2–8.1)
RBC # BLD AUTO: 4.69 M/UL (ref 3.8–5.8)
SODIUM SERPL-SCNC: 141 MMOL/L (ref 133–145)
TSH SERPL DL<=0.005 MIU/L-ACNC: 0.6 MCU/ML (ref 0.27–4.2)
WBC # BLD AUTO: 7.4 K/UL (ref 4–11)

## 2017-07-10 NOTE — PROGRESS NOTES
pls let patient know A1C is 6.9-continue with Metformin  Vit d now normal but still on low side so continue with the rx for 3 more months  Kidney and liver tests normal

## 2017-11-22 ENCOUNTER — OFFICE VISIT (OUTPATIENT)
Dept: FAMILY MEDICINE CLINIC | Age: 67
End: 2017-11-22

## 2017-11-22 VITALS
BODY MASS INDEX: 27.32 KG/M2 | DIASTOLIC BLOOD PRESSURE: 80 MMHG | WEIGHT: 170 LBS | TEMPERATURE: 96.7 F | HEIGHT: 66 IN | HEART RATE: 68 BPM | RESPIRATION RATE: 17 BRPM | SYSTOLIC BLOOD PRESSURE: 132 MMHG

## 2017-11-22 DIAGNOSIS — E55.9 VITAMIN D DEFICIENCY: ICD-10-CM

## 2017-11-22 DIAGNOSIS — E05.90 SUBCLINICAL HYPERTHYROIDISM: ICD-10-CM

## 2017-11-22 DIAGNOSIS — Z23 ENCOUNTER FOR IMMUNIZATION: ICD-10-CM

## 2017-11-22 DIAGNOSIS — F17.200 SMOKING: ICD-10-CM

## 2017-11-22 DIAGNOSIS — E11.65 TYPE 2 DIABETES MELLITUS WITH HYPERGLYCEMIA, WITHOUT LONG-TERM CURRENT USE OF INSULIN (HCC): Primary | ICD-10-CM

## 2017-11-22 DIAGNOSIS — I10 ESSENTIAL HYPERTENSION: ICD-10-CM

## 2017-11-22 DIAGNOSIS — M17.12 PRIMARY OSTEOARTHRITIS OF LEFT KNEE: ICD-10-CM

## 2017-11-22 LAB
MICROALBUMIN UR TEST STR-MCNC: 10 MG/L
MICROALBUMIN/CREAT RATIO POC: 30 MG/G

## 2017-11-22 NOTE — MR AVS SNAPSHOT
Visit Information Date & Time Provider Department Dept. Phone Encounter #  
 11/22/2017 10:30 AM Anibal Cheung MD [facility] [id number] Follow-up Instructions Return in about 3 months (around 2/22/2018) for follow up. Routing History Your Appointments 1/22/2018 10:00 AM  
Office Visit with Anibal Cheung MD  
[facility] Appt Note: mwv  
 [address]  
  
   
 [address] [facility] Upcoming Health Maintenance Date Due  
 EYE EXAM RETINAL OR DILATED Q1 4/28/2017 MICROALBUMIN Q1 7/18/2017 Influenza Age 5 to Adult 8/1/2017 HEMOGLOBIN A1C Q6M 1/5/2018 MEDICARE YEARLY EXAM 1/20/2018 LIPID PANEL Q1 1/21/2018 GLAUCOMA SCREENING Q2Y 4/28/2018 COLONOSCOPY 12/26/2019 DTaP/Tdap/Td series (2 - Td) 12/24/2024 Allergies as of 11/22/2017  Review Complete On: 11/22/2017 By: Anibal Cheung MD  
 No Known Allergies Current Immunizations  Reviewed on 1/19/2017 Name Date Influenza High Dose Vaccine PF 11/22/2017 11:09 AM, 10/18/2016 Influenza Vaccine 12/2/2013 Influenza Vaccine Eliezer Tyson) 10/15/2015 Pneumococcal Conjugate (PCV-13) 10/15/2015 Pneumococcal Polysaccharide (PPSV-23) 1/19/2017 Tdap 12/24/2014 Zoster Vaccine, Live 1/29/2016 Not reviewed this visit You Were Diagnosed With   
  
 Codes Comments Type 2 diabetes mellitus with hyperglycemia, without long-term current use of insulin (HCC)    -  Primary ICD-10-CM: E11.65 ICD-9-CM: 250.00, 790.29 Essential hypertension     ICD-10-CM: I10 
ICD-9-CM: 401.9 Subclinical hyperthyroidism     ICD-10-CM: E05.90 ICD-9-CM: 242.90 Vitamin D deficiency     ICD-10-CM: E55.9 ICD-9-CM: 268.9 Encounter for immunization     ICD-10-CM: I84 ICD-9-CM: V03.89 Vitals BP Pulse Temp Resp Height(growth percentile) Weight(growth percentile) 132/80 68 96.7 °F (35.9 °C) (Oral) 17 5' 6\" (1.676 m) 170 lb (77.1 kg) BMI Smoking Status 27.44 kg/m2 Light Tobacco Smoker Vitals History BMI and BSA Data Body Mass Index Body Surface Area  
 27.44 kg/m 2 1.89 m 2 Preferred Pharmacy Pharmacy Name Phone [facility]. [address] [address] [place] [address] [phone] Your Updated Medication List  
  
   
This list is accurate as of: 11/22/17 11:50 AM.  Always use your most recent med list. amLODIPine 5 mg tablet Commonly known as:  Jayesh Presser Take 1 Tab by mouth daily. aspirin 81 mg chewable tablet Take 81 mg by mouth daily. bisoprolol-hydroCHLOROthiazide 10-6.25 mg per tablet Commonly known as:  [facility] Take 2 tablets by mouth  daily  
  
 ergocalciferol 50,000 unit capsule Commonly known as:  ERGOCALCIFEROL Take 1 Cap by mouth every seven (7) days. metFORMIN 500 mg tablet Commonly known as:  GLUCOPHAGE Take 1 Tab by mouth daily (with dinner). naproxen 500 mg tablet Commonly known as:  NAPROSYN Take 1 Tab by mouth two (2) times daily (with meals). We Performed the Following AMB POC URINE, MICROALBUMIN, SEMIQUANTITATIVE [10800 CPT(R)] CBC WITH AUTOMATED DIFF [45082 CPT(R)] HEMOGLOBIN A1C W/O EAG [72719 CPT(R)] INFLUENZA VIRUS VACCINE, HIGH DOSE SEASONAL, PRESERVATIVE FREE [54788 CPT(R)] METABOLIC PANEL, COMPREHENSIVE [63360 CPT(R)]   
 T3 TOTAL [35138 CPT(R)] T4, FREE [id number] CPT(R)] TSH 3RD GENERATION [20249 CPT(R)] Follow-up Instructions Return in about 3 months (around 2/22/2018) for follow up. To-Do List   
 11/22/2017 Lab:  CBC WITH AUTOMATED DIFF   
  
 11/22/2017 Lab:  HEMOGLOBIN A1C W/O EAG   
  
 11/22/2017 Lab:  METABOLIC PANEL, COMPREHENSIVE   
  
 11/22/2017 Lab:  T3 TOTAL 11/22/2017 Lab:  T4, FREE   
  
 11/22/2017 Lab:  TSH 3RD GENERATION Patient Instructions Learning About Diabetes Food Guidelines Your Care Instructions Meal planning is important to manage diabetes. It helps keep your blood sugar at a target level (which you set with your doctor). You don't have to eat special foods. You can eat what your family eats, including sweets once in a while. But you do have to pay attention to how often you eat and how much you eat of certain foods. You may want to work with a dietitian or a certified diabetes educator (CDE) to help you plan meals and snacks. A dietitian or CDE can also help you lose weight if that is one of your goals. What should you know about eating carbs? Managing the amount of carbohydrate (carbs) you eat is an important part of healthy meals when you have diabetes. Carbohydrate is found in many foods. · Learn which foods have carbs. And learn the amounts of carbs in different foods. ¨ Bread, cereal, pasta, and rice have about 15 grams of carbs in a serving. A serving is 1 slice of bread (1 ounce), ½ cup of cooked cereal, or 1/3 cup of cooked pasta or rice. ¨ Fruits have 15 grams of carbs in a serving. A serving is 1 small fresh fruit, such as an apple or orange; ½ of a banana; ½ cup of cooked or canned fruit; ½ cup of fruit juice; 1 cup of melon or raspberries; or 2 tablespoons of dried fruit. ¨ Milk and no-sugar-added yogurt have 15 grams of carbs in a serving. A serving is 1 cup of milk or 2/3 cup of no-sugar-added yogurt. ¨ Starchy vegetables have 15 grams of carbs in a serving. A serving is ½ cup of mashed potatoes or sweet potato; 1 cup winter squash; ½ of a small baked potato; ½ cup of cooked beans; or ½ cup cooked corn or green peas. · Learn how much carbs to eat each day and at each meal. A dietitian or CDE can teach you how to keep track of the amount of carbs you eat. This is called carbohydrate counting. · If you are not sure how to count carbohydrate grams, use the Plate Method to plan meals. It is a good, quick way to make sure that you have a balanced meal. It also helps you spread carbs throughout the day. ¨ Divide your plate by types of foods. Put non-starchy vegetables on half the plate, meat or other protein food on one-quarter of the plate, and a grain or starchy vegetable in the final quarter of the plate. To this you can add a small piece of fruit and 1 cup of milk or yogurt, depending on how many carbs you are supposed to eat at a meal. 
· Try to eat about the same amount of carbs at each meal. Do not \"save up\" your daily allowance of carbs to eat at one meal. 
· Proteins have very little or no carbs per serving. Examples of proteins are beef, chicken, turkey, fish, eggs, tofu, cheese, cottage cheese, and peanut butter. A serving size of meat is 3 ounces, which is about the size of a deck of cards. Examples of meat substitute serving sizes (equal to 1 ounce of meat) are 1/4 cup of cottage cheese, 1 egg, 1 tablespoon of peanut butter, and ½ cup of tofu. How can you eat out and still eat healthy? · Learn to estimate the serving sizes of foods that have carbohydrate. If you measure food at home, it will be easier to estimate the amount in a serving of restaurant food. · If the meal you order has too much carbohydrate (such as potatoes, corn, or baked beans), ask to have a low-carbohydrate food instead. Ask for a salad or green vegetables. · If you use insulin, check your blood sugar before and after eating out to help you plan how much to eat in the future. · If you eat more carbohydrate at a meal than you had planned, take a walk or do other exercise. This will help lower your blood sugar. What else should you know? · Limit saturated fat, such as the fat from meat and dairy products.  This is a healthy choice because people who have diabetes are at higher risk of heart disease. So choose lean cuts of meat and nonfat or low-fat dairy products. Use olive or canola oil instead of butter or shortening when cooking. · Don't skip meals. Your blood sugar may drop too low if you skip meals and take insulin or certain medicines for diabetes. · Check with your doctor before you drink alcohol. Alcohol can cause your blood sugar to drop too low. Alcohol can also cause a bad reaction if you take certain diabetes medicines. Follow-up care is a key part of your treatment and safety. Be sure to make and go to all appointments, and call your doctor if you are having problems. It's also a good idea to know your test results and keep a list of the medicines you take. Where can you learn more? Go to http://dino-ajith.info/. Enter C378 in the search box to learn more about \"Learning About Diabetes Food Guidelines. \" Current as of: March 13, 2017 Content Version: 11.4 © 3251-1086 [employer]. Care instructions adapted under license by [employer] (which disclaims liability or warranty for this information). If you have questions about a medical condition or this instruction, always ask your healthcare professional. [place] [address] any warranty or liability for your use of this information. Introducing [facility]! Dear Sherry Granado: Thank you for requesting a [employer] account. Our records indicate that you have previously registered for a [employer] account but its currently inactive. Please call our [employer] support line at [phone]. Additional Information If you have questions, please visit the Frequently Asked Questions section of the [employer] website at [email or web address] Remember, [employer] is NOT to be used for urgent needs. For medical emergencies, dial 911. Now available from your iPhone and Android! Please provide this summary of care documentation to your next provider. Your primary care clinician is listed as Adelso Prado. If you have any questions after today's visit, please call [phone].

## 2017-11-22 NOTE — PROGRESS NOTES
1. Have you been to the ER, urgent care clinic since your last visit? Hospitalized since your last visit? No    2. Have you seen or consulted any other health care providers outside of the [address] [place] [address] since your last visit? Include any pap smears or colon screening. No     Patient presents for follow up hypertension. Patient wants a flu shot.

## 2017-11-22 NOTE — PROGRESS NOTES
Chief Complaint   Patient presents with    Immunization/Injection     *flu    Hypertension     *Follow up       Pt is a 79y.o. year old male who presents for follow up of his chronic medical problems    Health Maintenance Due   Topic Date Due    EYE EXAM RETINAL OR DILATED Q1  04/28/2017-done downstairs with Dr. Jeanette Pierce MICROALBUMIN Q1  07/18/2017-today    Influenza Age 9 to Adult  08/01/2017-today       Lab Results   Component Value Date/Time    Hemoglobin A1c 6.9 07/05/2017 11:03 AM    Hemoglobin A1c (POC) 6.8 05/27/2014 11:22 AM   Denies polyuria, polydipsia and polyphagia  On Metformin, no side effects      Lab Results   Component Value Date/Time    VITAMIN D, 25-HYDROXY 34.8 07/05/2017 11:03 AM   On rx       BP Readings from Last 3 Encounters:   11/22/17 141/82   07/05/17 162/81   04/11/17 138/80   Repeat BP-better       Cholesterol 164 110-200 mg/dL   Triglyceride 149  mg/dL   HDL 34 (L) 40-59 mg/dL   LDL CALCULATION 101 (H) 50-99 mg/dL   VLDL CALCULATION 30    Not fasting today    Still smoking  Insurance did not pay for Chantix    Lab Results   Component Value Date/Time    TSH 0.60 07/05/2017 11:03 AM       Wt Readings from Last 3 Encounters:   11/22/17 170 lb (77.1 kg)   07/05/17 168 lb 3.2 oz (76.3 kg)   04/11/17 169 lb (76.7 kg)     Seeing ENT for hearing loss-workman's comp    Saw Ortho:  Left knee injection-pain is better    ROS:    Pt denies: Wt loss, Fever/Chills, HA, Visual changes, Fatigue, Chest pain, SOB, SALMON, Abd pain, N/V/D/C, Blood in stool or urine, Edema. Pertinent positive as above in HPI.  All others were negative    Patient Active Problem List   Diagnosis Code    Smoking F17.200    Essential hypertension I10    Vitamin D deficiency E55.9    Subclinical hyperthyroidism E05.90    Type 2 diabetes mellitus with hyperglycemia, without long-term current use of insulin (HCC) E11.65    Primary osteoarthritis of left knee M17.12       Past Medical History:   Diagnosis Date    Hypertension        Current Outpatient Prescriptions   Medication Sig Dispense Refill    metFORMIN (GLUCOPHAGE) 500 mg tablet Take 1 Tab by mouth daily (with dinner). 90 Tab 3    amLODIPine (NORVASC) 5 mg tablet Take 1 Tab by mouth daily. 90 Tab 3    ergocalciferol (ERGOCALCIFEROL) 50,000 unit capsule Take 1 Cap by mouth every seven (7) days. 12 Cap 1    bisoprolol-hydroCHLOROthiazide (ZIAC) 10-6.25 mg per tablet Take 2 tablets by mouth  daily 180 Tab 3    naproxen (NAPROSYN) 500 mg tablet Take 1 Tab by mouth two (2) times daily (with meals). 60 Tab 3    aspirin 81 mg chewable tablet Take 81 mg by mouth daily. History   Smoking Status    Light Tobacco Smoker   Smokeless Tobacco    Never Used       No Known Allergies    Patient Labs were reviewed: yes      Patient Past Records were reviewed:  yes        Objective:     Vitals:    11/22/17 1034 11/22/17 1137   BP: 141/82 132/80   Pulse: 68    Resp: 17    Temp: 96.7 °F (35.9 °C)    TempSrc: Oral    Weight: 170 lb (77.1 kg)    Height: 5' 6\" (1.676 m)      Body mass index is 27.44 kg/(m^2). Exam:   Appearance: alert, well appearing,  oriented to person, place, and time, acyanotic, in no respiratory distress and well hydrated. HEENT:  NC/AT, pink conj, anicteric sclerae  Neck:  No cervical lymphadenopathy, no JVD, no thyromegaly, no carotid bruit  Heart:  RRR without M/R/G  Lungs:  CTAB, no rhonchi, rales, or wheezes with good air exchange   Abdomen:  Non-tender, pos bowel sounds, no hepatosplenomegaly  Ext:  No C/C/E    Skin: no rash  Neuro: no lateralizing signs, CNs II-XII intact      Assessment/ Plan:   Diagnoses and all orders for this visit:    1. Type 2 diabetes mellitus with hyperglycemia, without long-term current use of insulin (HCC)-continue with Metformin; check fasting lipids next visit with goal LDL less than 100 bec of DM  -     AMB POC URINE, MICROALBUMIN, SEMIQUANTITATIVE  -     HEMOGLOBIN A1C W/O EAG; Future    2.  Essential hypertension-controlled, continue with present meds  -     CBC WITH AUTOMATED DIFF; Future  -     METABOLIC PANEL, COMPREHENSIVE; Future    3. Subclinical hyperthyroidism-will continue to monitor TFTs  -     TSH 3RD GENERATION; Future  -     T4, FREE; Future  -     T3 TOTAL; Future    4. Vitamin D deficiency-on Rx, check vit D level    5. Primary osteoarthritis of left knee-pain is better after injection by Ortho    6. Smoking-smoking cessation discussed again    7. Encounter for immunization  -     INFLUENZA VIRUS VACCINE, HIGH DOSE SEASONAL, PRESERVATIVE FREE        Follow-up Disposition:  Return in about 3 months (around 2/22/2018) for follow up. I have discussed the diagnosis with the patient and the intended plan as seen in the above orders. The patient has received an After-Visit Summary and questions were answered concerning future plans. Medication Side Effects and Warnings were discussed with patient: yes    Patient verbalized understanding of above instructions.     Verona Yuen MD  Internal Medicine  [facility]

## 2017-11-22 NOTE — PATIENT INSTRUCTIONS

## 2017-11-23 LAB
25(OH)D3 SERPL-MCNC: 39.8 NG/ML (ref 32–100)
25(OH)D3 SERPL-MCNC: 39.8 NG/ML (ref 32–100)
A-G RATIO,AGRAT: 1.5 RATIO (ref 1.1–2.6)
ABSOLUTE LYMPHOCYTE COUNT, 10803: 2.4 K/UL (ref 1–4.8)
ALBUMIN SERPL-MCNC: 4.4 G/DL (ref 3.5–5)
ALP SERPL-CCNC: 78 U/L (ref 40–125)
ALT SERPL-CCNC: 29 U/L (ref 5–40)
ANION GAP SERPL CALC-SCNC: 17 MMOL/L
AST SERPL W P-5'-P-CCNC: 21 U/L (ref 10–37)
AVG GLU, 10930: 144 MG/DL (ref 91–123)
BASOPHILS # BLD: 0 K/UL (ref 0–0.2)
BASOPHILS NFR BLD: 0 % (ref 0–2)
BILIRUB SERPL-MCNC: 0.4 MG/DL (ref 0.2–1.2)
BUN SERPL-MCNC: 15 MG/DL (ref 6–22)
CALCIUM SERPL-MCNC: 9.7 MG/DL (ref 8.4–10.4)
CHLORIDE SERPL-SCNC: 102 MMOL/L (ref 98–110)
CO2 SERPL-SCNC: 25 MMOL/L (ref 20–32)
CREAT SERPL-MCNC: 1.1 MG/DL (ref 0.8–1.6)
EOSINOPHIL # BLD: 0.2 K/UL (ref 0–0.5)
EOSINOPHIL NFR BLD: 3 % (ref 0–6)
ERYTHROCYTE [DISTWIDTH] IN BLOOD BY AUTOMATED COUNT: 14.6 % (ref 10–16)
GFRAA, 66117: >60
GFRNA, 66118: >60
GLOBULIN,GLOB: 2.9 G/DL (ref 2–4)
GLUCOSE SERPL-MCNC: 120 MG/DL (ref 70–99)
GRANULOCYTES,GRANS: 60 % (ref 40–75)
HBA1C MFR BLD HPLC: 6.6 % (ref 4.8–5.9)
HCT VFR BLD AUTO: 45.7 % (ref 37.8–52.2)
HGB BLD-MCNC: 15.1 G/DL (ref 12.6–17.1)
LYMPHOCYTES, LYMLT: 30 % (ref 27–45)
MCH RBC QN AUTO: 32 PG (ref 26–34)
MCHC RBC AUTO-ENTMCNC: 33 G/DL (ref 32–36)
MCV RBC AUTO: 98 FL (ref 80–95)
MONOCYTES # BLD: 0.5 K/UL (ref 0.1–0.9)
MONOCYTES NFR BLD: 7 % (ref 3–9)
NEUTROPHILS # BLD AUTO: 4.7 K/UL (ref 1.8–7.7)
PLATELET # BLD AUTO: 187 K/UL (ref 140–440)
PMV BLD AUTO: 11.7 FL (ref 6–10.8)
POTASSIUM SERPL-SCNC: 4.7 MMOL/L (ref 3.5–5.5)
PROT SERPL-MCNC: 7.3 G/DL (ref 6.2–8.1)
RBC # BLD AUTO: 4.66 M/UL (ref 3.8–5.8)
SODIUM SERPL-SCNC: 144 MMOL/L (ref 133–145)
T3 TOTAL,T3LT: 111 NG/DL (ref 80–200)
T4 FREE SERPL-MCNC: 1.5 NG/DL (ref 0.9–1.8)
TSH SERPL DL<=0.005 MIU/L-ACNC: 0.74 MCU/ML (ref 0.27–4.2)
WBC # BLD AUTO: 7.8 K/UL (ref 4–11)

## 2017-11-24 NOTE — PROGRESS NOTES
pls let patient know A1C is 6.6 so continue with same dose of Metformin  Vit D now normal so he can finish off rx then no need to refill this  The rest of the labs were normal

## 2018-02-20 ENCOUNTER — OFFICE VISIT (OUTPATIENT)
Dept: FAMILY MEDICINE CLINIC | Age: 68
End: 2018-02-20

## 2018-02-20 VITALS
DIASTOLIC BLOOD PRESSURE: 77 MMHG | SYSTOLIC BLOOD PRESSURE: 137 MMHG | HEART RATE: 72 BPM | BODY MASS INDEX: 27.32 KG/M2 | OXYGEN SATURATION: 95 % | HEIGHT: 66 IN | RESPIRATION RATE: 18 BRPM | TEMPERATURE: 97.7 F | WEIGHT: 170 LBS

## 2018-02-20 DIAGNOSIS — I10 ESSENTIAL HYPERTENSION: ICD-10-CM

## 2018-02-20 DIAGNOSIS — M17.12 PRIMARY OSTEOARTHRITIS OF LEFT KNEE: ICD-10-CM

## 2018-02-20 DIAGNOSIS — F17.200 SMOKING: ICD-10-CM

## 2018-02-20 DIAGNOSIS — E55.9 VITAMIN D DEFICIENCY: ICD-10-CM

## 2018-02-20 DIAGNOSIS — E66.3 OVERWEIGHT (BMI 25.0-29.9): ICD-10-CM

## 2018-02-20 DIAGNOSIS — E11.65 TYPE 2 DIABETES MELLITUS WITH HYPERGLYCEMIA, WITHOUT LONG-TERM CURRENT USE OF INSULIN (HCC): Primary | ICD-10-CM

## 2018-02-20 DIAGNOSIS — E05.90 SUBCLINICAL HYPERTHYROIDISM: ICD-10-CM

## 2018-02-20 LAB — HBA1C MFR BLD HPLC: 6.3 %

## 2018-02-20 RX ORDER — VARENICLINE TARTRATE 25 MG
KIT ORAL
Qty: 1 DOSE PACK | Refills: 2 | Status: SHIPPED | OUTPATIENT
Start: 2018-02-20 | End: 2018-11-27

## 2018-02-20 NOTE — PROGRESS NOTES
Chief Complaint   Patient presents with    Follow Up Chronic Condition     A1C, patient is not fasting     1. Have you been to the ER, urgent care clinic since your last visit? Hospitalized since your last visit? No    2. Have you seen or consulted any other health care providers outside of the [address] [place] [address] since your last visit? Include any pap smears or colon screening.  No

## 2018-02-20 NOTE — MR AVS SNAPSHOT
Miah Benson [place] [address] [facility] [address] [facility] 83 [id number] [phone] Patient: Michell Schrader MRN: [id number] NVW:[id number]/21/[id number] Visit Information Date & Time Provider Department Dept. Phone Encounter #  
 2/20/2018 11:15 AM Megha [facility]es De [facility] [id number] Follow-up Instructions Return in about 3 months (around 5/20/2018) for follow up. Upcoming Health Maintenance Date Due  
 LIPID PANEL Q1 1/21/2018 HEMOGLOBIN A1C Q6M 5/22/2018 EYE EXAM RETINAL OR DILATED Q1 7/12/2018 MICROALBUMIN Q1 11/22/2018 MEDICARE YEARLY EXAM 2/21/2019 GLAUCOMA SCREENING Q2Y 7/12/2019 COLONOSCOPY 12/26/2019 DTaP/Tdap/Td series (2 - Td) 12/24/2024 Allergies as of 2/20/2018  Review Complete On: 2/20/2018 By: Megha Montes De Oca MD  
 No Known Allergies Current Immunizations  Reviewed on 1/19/2017 Name Date Influenza High Dose Vaccine PF 11/22/2017 11:09 AM, 10/18/2016 Influenza Vaccine 12/2/2013 Influenza Vaccine Rober Birks) 10/15/2015 Pneumococcal Conjugate (PCV-13) 10/15/2015 Pneumococcal Polysaccharide (PPSV-23) 1/19/2017 Tdap 12/24/2014 Zoster Vaccine, Live 1/29/2016 Not reviewed this visit You Were Diagnosed With   
  
 Codes Comments Type 2 diabetes mellitus with hyperglycemia, without long-term current use of insulin (HCC)    -  Primary ICD-10-CM: E11.65 ICD-9-CM: 250.00, 790.29 Essential hypertension     ICD-10-CM: I10 
ICD-9-CM: 401.9 Vitamin D deficiency     ICD-10-CM: E55.9 ICD-9-CM: 268.9 Subclinical hyperthyroidism     ICD-10-CM: E05.90 ICD-9-CM: 242.90 Primary osteoarthritis of left knee     ICD-10-CM: M17.12 
ICD-9-CM: 715.16 Smoking     ICD-10-CM: F17.200 ICD-9-CM: 305.1 Overweight (BMI 25.0-29. 9)     ICD-10-CM: D30.8 ICD-9-CM: 278.02 Vitals BP Pulse Temp Resp Height(growth percentile) Weight(growth percentile) 137/77 72 97.7 °F (36.5 °C) (Oral) 18 5' 6\" (1.676 m) 170 lb (77.1 kg) SpO2 BMI Smoking Status 95% 27.44 kg/m2 Light Tobacco Smoker BMI and BSA Data Body Mass Index Body Surface Area  
 27.44 kg/m 2 1.89 m 2 Preferred Pharmacy Pharmacy Name Phone 305 [facility], [address] [address] [place] [address] [address] Melvi Torres 134 Your Updated Medication List  
  
   
This list is accurate as of: 2/20/18 11:54 AM.  Always use your most recent med list. amLODIPine 5 mg tablet Commonly known as:  Sophialori Davidson Take 1 Tab by mouth daily. aspirin 81 mg chewable tablet Take 81 mg by mouth daily. bisoprolol-hydroCHLOROthiazide 10-6.25 mg per tablet Commonly known as:  [facility] TAKE 2 TABLETS BY MOUTH  DAILY  
  
 metFORMIN 500 mg tablet Commonly known as:  GLUCOPHAGE Take 1 Tab by mouth daily (with dinner). varenicline 0.5 mg (11)- 1 mg (42) Dspk Commonly known as:  Chantix Use as directed in the pack Prescriptions Printed Refills  
 varenicline (CHANTIX) 0.5 mg (11)- 1 mg (42) DsPk 2 Sig: Use as directed in the pack Class: Print We Performed the Following AMB POC HEMOGLOBIN A1C [07265 CPT(R)] Follow-up Instructions Return in about 3 months (around 5/20/2018) for follow up. Patient Instructions Stopping Smoking: Care Instructions Your Care Instructions Cigarette smokers crave the nicotine in cigarettes. Giving it up is much harder than simply changing a habit. Your body has to stop craving the nicotine. It is hard to quit, but you can do it. There are many tools that people use to quit smoking. You may find that combining tools works best for you. There are several steps to quitting. First you get ready to quit. Then you get support to help you. After that, you learn new skills and behaviors to become a nonsmoker.  For many people, a necessary step is getting and using medicine. Your doctor will help you set up the plan that best meets your needs. You may want to attend a smoking cessation program to help you quit smoking. When you choose a program, look for one that has proven success. Ask your doctor for ideas. You will greatly increase your chances of success if you take medicine as well as get counseling or join a cessation program. 
Some of the changes you feel when you first quit tobacco are uncomfortable. Your body will miss the nicotine at first, and you may feel short-tempered and grumpy. You may have trouble sleeping or concentrating. Medicine can help you deal with these symptoms. You may struggle with changing your smoking habits and rituals. The last step is the tricky one: Be prepared for the smoking urge to continue for a time. This is a lot to deal with, but keep at it. You will feel better. Follow-up care is a key part of your treatment and safety. Be sure to make and go to all appointments, and call your doctor if you are having problems. It's also a good idea to know your test results and keep a list of the medicines you take. How can you care for yourself at home? · Ask your family, friends, and coworkers for support. You have a better chance of quitting if you have help and support. · Join a support group, such as Nicotine Anonymous, for people who are trying to quit smoking. · Consider signing up for a smoking cessation program, such as the American Lung Association's Freedom from Smoking program. 
· Set a quit date. Pick your date carefully so that it is not right in the middle of a big deadline or stressful time. Once you quit, do not even take a puff. Get rid of all ashtrays and lighters after your last cigarette. Clean your house and your clothes so that they do not smell of smoke. · Learn how to be a nonsmoker. Think about ways you can avoid those things that make you reach for a cigarette. ¨ Avoid situations that put you at greatest risk for smoking. For some people, it is hard to have a drink with friends without smoking. For others, they might skip a coffee break with coworkers who smoke. ¨ Change your daily routine. Take a different route to work or eat a meal in a different place. · Cut down on stress. Calm yourself or release tension by doing an activity you enjoy, such as reading a book, taking a hot bath, or gardening. · Talk to your doctor or pharmacist about nicotine replacement therapy, which replaces the nicotine in your body. You still get nicotine but you do not use tobacco. Nicotine replacement products help you slowly reduce the amount of nicotine you need. These products come in several forms, many of them available over-the-counter: ¨ Nicotine patches ¨ Nicotine gum and lozenges ¨ Nicotine inhaler · Ask your doctor about bupropion (Wellbutrin) or varenicline (Chantix), which are prescription medicines. They do not contain nicotine. They help you by reducing withdrawal symptoms, such as stress and anxiety. · Some people find hypnosis, acupuncture, and massage helpful for ending the smoking habit. · Eat a healthy diet and get regular exercise. Having healthy habits will help your body move past its craving for nicotine. · Be prepared to keep trying. Most people are not successful the first few times they try to quit. Do not get mad at yourself if you smoke again. Make a list of things you learned and think about when you want to try again, such as next week, next month, or next year. Where can you learn more? Go to http://dino-ajith.info/. Enter N169 in the search box to learn more about \"Stopping Smoking: Care Instructions. \" Current as of: March 20, 2017 Content Version: 11.4 © 9101-1689 Healthwise, Smule.  Care instructions adapted under license by [place] (which disclaims liability or warranty for this information). If you have questions about a medical condition or this instruction, always ask your healthcare professional. [address] any warranty or liability for your use of this information. Introducing hospitals & HEALTH SERVICES! Dear Todd Bonner: Thank you for requesting a Terressentia account. Our records indicate that you have previously registered for a Terressentia account but its currently inactive. Please call our Terressentia support line at [phone]. Additional Information If you have questions, please visit the Frequently Asked Questions section of the Terressentia website at [email or web address] Remember, Terressentia is NOT to be used for urgent needs. For medical emergencies, dial 911. Now available from your iPhone and Android! Please provide this summary of care documentation to your next provider. Your primary care clinician is listed as Mannie Huang. If you have any questions after today's visit, please call [phone].

## 2018-02-20 NOTE — PATIENT INSTRUCTIONS

## 2018-02-20 NOTE — PROGRESS NOTES
Chief Complaint   Patient presents with    Follow Up Chronic Condition     A1C, patient is not fasting       Pt is a 79y.o. year old male who presents for follow up of his chronic medical problems    Health Maintenance Due   Topic Date Due    MEDICARE YEARLY EXAM  01/20/2018-not needed    LIPID PANEL Q1  01/21/2018     Lipid Complete Panel4/22/2017  [address]  Component Name Value Ref Range   Cholesterol 164 110-200 mg/dL   Triglyceride 149  mg/dL   HDL 34 (L) 40-59 mg/dL   LDL CALCULATION 101 (H) 50-99 mg/dL   VLDL CALCULATION 30 8-30 mg/dL   Fasting? Not on statin    BP Readings from Last 3 Encounters:   02/20/18 137/77   11/22/17 132/80   07/05/17 162/81       Lab Results   Component Value Date/Time    Hemoglobin A1c 6.6 (H) 11/22/2017 11:49 AM    Hemoglobin A1c (POC) 6.3 02/20/2018 11:41 AM   6.3% today  Denies polyuria, polydipsia and polyphagia    Left Knee pain-s/p inj 4 months ago, still doing well    Smoking-yes, ins did not fill Chantix    Lab Results   Component Value Date/Time    TSH 0.74 11/22/2017 11:49 AM   Hx of subclinical hyperthyroidism      Lab Results   Component Value Date/Time    VITAMIN D, 25-HYDROXY 39.8 11/22/2017 11:49 AM    VITAMIN D, 25-HYDROXY 39.8 11/22/2017 11:49 AM     S/p  rx    Wt Readings from Last 3 Encounters:   02/20/18 170 lb (77.1 kg)   11/22/17 170 lb (77.1 kg)   07/05/17 168 lb 3.2 oz (76.3 kg)   BMI 27      ROS:    Pt denies: Wt loss, Fever/Chills, HA, Visual changes, Fatigue, Chest pain, SOB, SALMON, Abd pain, N/V/D/C, Blood in stool or urine, Edema. Pertinent positive as above in HPI. All others were negative    Patient Active Problem List   Diagnosis Code    Smoking F17.200    Essential hypertension I10    Subclinical hyperthyroidism E05.90    Type 2 diabetes mellitus with hyperglycemia, without long-term current use of insulin (HCC) E11.65    Primary osteoarthritis of left knee M17.12    Overweight (BMI 25.0-29. 9) E66.3       Past Medical History:   Diagnosis Date    Hypertension        Current Outpatient Prescriptions   Medication Sig Dispense Refill    varenicline (CHANTIX) 0.5 mg (11)- 1 mg (42) DsPk Use as directed in the pack 1 Dose Pack 2    bisoprolol-hydroCHLOROthiazide (ZIAC) 10-6.25 mg per tablet TAKE 2 TABLETS BY MOUTH  DAILY 180 Tab 3    metFORMIN (GLUCOPHAGE) 500 mg tablet Take 1 Tab by mouth daily (with dinner). 90 Tab 3    amLODIPine (NORVASC) 5 mg tablet Take 1 Tab by mouth daily. 90 Tab 3    aspirin 81 mg chewable tablet Take 81 mg by mouth daily. History   Smoking Status    Light Tobacco Smoker   Smokeless Tobacco    Never Used       No Known Allergies    Patient Labs were reviewed: yes      Patient Past Records were reviewed:  yes        Objective:     Vitals:    02/20/18 1128   BP: 137/77   Pulse: 72   Resp: 18   Temp: 97.7 °F (36.5 °C)   TempSrc: Oral   SpO2: 95%   Weight: 170 lb (77.1 kg)   Height: 5' 6\" (1.676 m)     Body mass index is 27.44 kg/(m^2). Exam:   Appearance: alert, well appearing,  oriented to person, place, and time, acyanotic, in no respiratory distress and well hydrated. HEENT:  NC/AT, pink conj, anicteric sclerae  Neck:  No cervical lymphadenopathy, no JVD, no thyromegaly, no carotid bruit  Heart:  RRR without M/R/G  Lungs:  CTAB, no rhonchi, rales, or wheezes with good air exchange   Abdomen:  Non-tender, pos bowel sounds, no hepatosplenomegaly  Ext:  No C/C/E    Skin: no rash  Neuro: no lateralizing signs, CNs II-XII intact    Last Point of Care HGB A1C  Hemoglobin A1c (POC)   Date Value Ref Range Status   02/20/2018 6.3 % Final        Assessment/ Plan:   Diagnoses and all orders for this visit:    1. Type 2 diabetes mellitus with hyperglycemia, without long-term current use of insulin (HCC)-controlled, continue with present meds  -     AMB POC HEMOGLOBIN A1C    2. Essential hypertension-controlled, continue with ACE/diuretic and Norvasc; fasting lipids next visit    3.  Vitamin D deficiency-s/p rx; check level next visit    4. Subclinical hyperthyroidism-most recent TSh normal    5. Primary osteoarthritis of left knee-better s/p steroid inj    6. Smoking  -     varenicline (CHANTIX) 0.5 mg (11)- 1 mg (42) DsPk; Use as directed in the pack    7. Overweight (BMI 25.0-29. 9)-discussed diet and exercise to lose weight        Follow-up Disposition:  Return in about 3 months (around 5/20/2018) for follow up. I have discussed the diagnosis with the patient and the intended plan as seen in the above orders. The patient has received an After-Visit Summary and questions were answered concerning future plans. Medication Side Effects and Warnings were discussed with patient: yes    Patient verbalized understanding of above instructions.     Robinette Sandifer, MD  Internal Medicine  [facility]

## 2018-05-21 ENCOUNTER — OFFICE VISIT (OUTPATIENT)
Dept: FAMILY MEDICINE CLINIC | Age: 68
End: 2018-05-21

## 2018-05-21 VITALS
HEART RATE: 59 BPM | OXYGEN SATURATION: 96 % | RESPIRATION RATE: 16 BRPM | TEMPERATURE: 97 F | DIASTOLIC BLOOD PRESSURE: 68 MMHG | SYSTOLIC BLOOD PRESSURE: 128 MMHG | BODY MASS INDEX: 26.84 KG/M2 | WEIGHT: 167 LBS | HEIGHT: 66 IN

## 2018-05-21 DIAGNOSIS — I10 ESSENTIAL HYPERTENSION: ICD-10-CM

## 2018-05-21 DIAGNOSIS — E11.65 TYPE 2 DIABETES MELLITUS WITH HYPERGLYCEMIA, WITHOUT LONG-TERM CURRENT USE OF INSULIN (HCC): Primary | ICD-10-CM

## 2018-05-21 DIAGNOSIS — Z71.89 ADVANCED DIRECTIVES, COUNSELING/DISCUSSION: ICD-10-CM

## 2018-05-21 DIAGNOSIS — E66.3 OVERWEIGHT (BMI 25.0-29.9): ICD-10-CM

## 2018-05-21 DIAGNOSIS — F17.200 SMOKING: ICD-10-CM

## 2018-05-21 DIAGNOSIS — E05.90 SUBCLINICAL HYPERTHYROIDISM: ICD-10-CM

## 2018-05-21 DIAGNOSIS — M72.2 PLANTAR FASCIITIS: ICD-10-CM

## 2018-05-21 NOTE — PROGRESS NOTES
Chief Complaint   Patient presents with    Follow Up Chronic Condition     3 month; patient is fasting and due for lipid panel     1. Have you been to the ER, urgent care clinic since your last visit? Hospitalized since your last visit? No    2. Have you seen or consulted any other health care providers outside of the [address] [place] [address] since your last visit? Include any pap smears or colon screening.  No

## 2018-05-21 NOTE — MR AVS SNAPSHOT
Miah Oden [facility] 8[address] [facility] [address] [facility] 83 [id number] 
[phone] Patient: Anselmo Vyas MRN: [id number] GO Visit Information Date & Time Provider Department Dept. Phone Encounter #  
 2018 10:45 AM Neel Salamanca MD [facility] [id number] Follow-up Instructions Return in about 3 months (around 2018) for follow up. Upcoming Health Maintenance Date Due  
 LIPID PANEL Q1 2018 EYE EXAM RETINAL OR DILATED Q1 2018 Influenza Age 5 to Adult 2018 HEMOGLOBIN A1C Q6M 2018 MICROALBUMIN Q1 2018 GLAUCOMA SCREENING Q2Y 2019 COLONOSCOPY 2019 DTaP/Tdap/Td series (2 - Td) 2024 Allergies as of 2018  Review Complete On: 2018 By: Neel Salamanca MD  
 No Known Allergies Current Immunizations  Reviewed on 2017 Name Date Influenza High Dose Vaccine PF 2017 11:09 AM, 10/18/2016 Influenza Vaccine 2013 Influenza Vaccine Osito Kappa) 10/15/2015 Pneumococcal Conjugate (PCV-13) 10/15/2015 Pneumococcal Polysaccharide (PPSV-23) 2017 Tdap 2014 Zoster Vaccine, Live 2016 Not reviewed this visit You Were Diagnosed With   
  
 Codes Comments Type 2 diabetes mellitus with hyperglycemia, without long-term current use of insulin (HCC)    -  Primary ICD-10-CM: E11.65 ICD-9-CM: 250.00, 790.29 Subclinical hyperthyroidism     ICD-10-CM: E05.90 ICD-9-CM: 242.90 Essential hypertension     ICD-10-CM: I10 
ICD-9-CM: 401.9 Advanced directives, counseling/discussion     ICD-10-CM: Z71.89 ICD-9-CM: V65.49 Smoking     ICD-10-CM: F17.200 ICD-9-CM: 305.1 Plantar fasciitis     ICD-10-CM: M72.2 ICD-9-CM: 728.71 Vitals BP Pulse Temp Resp Height(growth percentile) Weight(growth percentile) 128/68 (!) 59 97 °F (36.1 °C) (Oral) 16 5' 6\" (1.676 m) 167 lb (75.8 kg) SpO2 BMI Smoking Status 96% 26.95 kg/m2 Light Tobacco Smoker Vitals History BMI and BSA Data Body Mass Index Body Surface Area  
 26.95 kg/m 2 1.88 m 2 Preferred Pharmacy Pharmacy Name Phone [phone] [address] [phone] Your Updated Medication List  
  
   
This list is accurate as of 5/21/18 11:27 AM.  Always use your most recent med list. amLODIPine 5 mg tablet Commonly known as:  Job Dub Take 1 Tab by mouth daily. aspirin 81 mg chewable tablet Take 81 mg by mouth daily. bisoprolol-hydroCHLOROthiazide 10-6.25 mg per tablet Commonly known as:  [facility] TAKE 2 TABLETS BY MOUTH  DAILY  
  
 metFORMIN 500 mg tablet Commonly known as:  GLUCOPHAGE Take 1 Tab by mouth daily (with dinner). varenicline 0.5 mg (11)- 1 mg (42) Dspk Commonly known as:  Chantix Use as directed in the pack Follow-up Instructions Return in about 3 months (around 8/21/2018) for follow up. To-Do List   
 05/21/2018 Lab:  CBC WITH AUTOMATED DIFF   
  
 05/21/2018 Lab:  HEMOGLOBIN A1C W/O EAG   
  
 05/21/2018 Lab:  LIPID PANEL   
  
 05/21/2018 Lab:  METABOLIC PANEL, COMPREHENSIVE   
  
 05/21/2018 Lab:  TSH 3RD GENERATION   
  
 06/21/2018 Imaging:  CT LOW DOSE LUNG CANCER SCREENING Patient Instructions Plantar Fasciitis: Exercises Your Care Instructions Here are some examples of typical rehabilitation exercises for your condition. Start each exercise slowly. Ease off the exercise if you start to have pain. Your doctor or physical therapist will tell you when you can start these exercises and which ones will work best for you. How to do the exercises Towel stretch 1. Sit with your legs extended and knees straight. 2. Place a towel around your foot just under the toes. 3. Hold each end of the towel in each hand, with your hands above your knees. 4. Pull back with the towel so that your foot stretches toward you. 5. Hold the position for at least 15 to 30 seconds. 6. Repeat 2 to 4 times a session, up to 5 sessions a day. Calf stretch This exercise stretches the muscles at the back of the lower leg (the calf) and the Achilles tendon. Do this exercise 3 or 4 times a day, 5 days a week. 1. Stand facing a wall with your hands on the wall at about eye level. Put the leg you want to stretch about a step behind your other leg. 2. Keeping your back heel on the floor, bend your front knee until you feel a stretch in the back leg. 3. Hold the stretch for 15 to 30 seconds. Repeat 2 to 4 times. Plantar fascia and calf stretch Stretching the plantar fascia and calf muscles can increase flexibility and decrease heel pain. You can do this exercise several times each day and before and after activity. 1. Stand on a step as shown above. Be sure to hold on to the banister. 2. Slowly let your heels down over the edge of the step as you relax your calf muscles. You should feel a gentle stretch across the bottom of your foot and up the back of your leg to your knee. 3. Hold the stretch about 15 to 30 seconds, and then tighten your calf muscle a little to bring your heel back up to the level of the step. Repeat 2 to 4 times. Towel curls Make this exercise more challenging by placing a weighted object, such as a soup can, on the other end of the towel. 1. While sitting, place your foot on a towel on the floor and scrunch the towel toward you with your toes. 2. Then, also using your toes, push the towel away from you. Clio pickups 1. Put marbles on the floor next to a cup. 
2. Using your toes, try to lift the marbles up from the floor and put them in the cup. Follow-up care is a key part of your treatment and safety. Be sure to make and go to all appointments, and call your doctor if you are having problems. It's also a good idea to know your test results and keep a list of the medicines you take. Where can you learn more? Go to http://dino-ajith.info/. Rosario Mullins in the search box to learn more about \"Plantar Fasciitis: Exercises. \" Current as of: March 21, 2017 Content Version: 11.4 © 3092-9171 Agistics. Care instructions adapted under license by [employer] (which disclaims liability or warranty for this information). If you have questions about a medical condition or this instruction, always ask your healthcare professional. [address] any warranty or liability for your use of this information. Introducing [facility] & HEALTH SERVICES! Dear Curt Marinelli: Thank you for requesting a Tactilize account. Our records indicate that you have previously registered for a Tactilize account but its currently inactive. Please call our Tactilize support line at [phone]. Additional Information If you have questions, please visit the Frequently Asked Questions section of the Tactilize website at [email or web address] Remember, Tactilize is NOT to be used for urgent needs. For medical emergencies, dial 911. Now available from your iPhone and Android! Please provide this summary of care documentation to your next provider. Your primary care clinician is listed as Trevor De Oliveira. If you have any questions after today's visit, please call [phone].

## 2018-05-21 NOTE — PROGRESS NOTES
Chief Complaint   Patient presents with    Follow Up Chronic Condition     3 month; patient is fasting and due for lipid panel       Pt is a 79y.o. year old male who presents for follow up of his chronic medical problems    Health Maintenance Due   Topic Date Due    LIPID PANEL Q1  01/21/2018   Fasting today    Left knee OA s/p injection-pain is better but has stiffness after prolonged sitting  Pain is also felt more on his heels after prolonged sitting  Still working at the [id number]    BP Readings from Last 3 Encounters:   05/21/18 128/68   02/20/18 137/77   11/22/17 132/80   Repeat BP-better  On Norvasc and Ziac    BMI 26  Wt Readings from Last 3 Encounters:   05/21/18 167 lb (75.8 kg)   02/20/18 170 lb (77.1 kg)   11/22/17 170 lb (77.1 kg)       Lab Results   Component Value Date/Time    Hemoglobin A1c 6.6 (H) 11/22/2017 11:49 AM    Hemoglobin A1c (POC) 6.3 02/20/2018 11:41 AM       Still smoking-Chantix not covered by insurance  Smoking since teenager-less than 1/2 ppd    Lab Results   Component Value Date/Time    TSH 0.74 11/22/2017 11:49 AM       ROS:    Pt denies: Wt loss, Fever/Chills, HA, Visual changes, Fatigue, Chest pain, SOB, SALMON, Abd pain, N/V/D/C, Blood in stool or urine, Edema. Pertinent positive as above in HPI. All others were negative    Patient Active Problem List   Diagnosis Code    Smoking F17.200    Essential hypertension I10    Subclinical hyperthyroidism E05.90    Type 2 diabetes mellitus with hyperglycemia, without long-term current use of insulin (HCC) E11.65    Primary osteoarthritis of left knee M17.12    Overweight (BMI 25.0-29. 9) E66.3    Advanced directives, counseling/discussion Z71.89       Past Medical History:   Diagnosis Date    Hypertension        Current Outpatient Prescriptions   Medication Sig Dispense Refill    bisoprolol-hydroCHLOROthiazide (ZIAC) 10-6.25 mg per tablet TAKE 2 TABLETS BY MOUTH  DAILY 180 Tab 3    metFORMIN (GLUCOPHAGE) 500 mg tablet Take 1 Tab by mouth daily (with dinner). 90 Tab 3    amLODIPine (NORVASC) 5 mg tablet Take 1 Tab by mouth daily. 90 Tab 3    aspirin 81 mg chewable tablet Take 81 mg by mouth daily.  varenicline (CHANTIX) 0.5 mg (11)- 1 mg (42) DsPk Use as directed in the pack 1 Dose Pack 2       History   Smoking Status    Light Tobacco Smoker   Smokeless Tobacco    Never Used       No Known Allergies    Patient Labs were reviewed: yes      Patient Past Records were reviewed:  yes        Objective:     Vitals:    05/21/18 1026 05/21/18 1120   BP: (!) 151/91 128/68   Pulse: (!) 59    Resp: 16    Temp: 97 °F (36.1 °C)    TempSrc: Oral    SpO2: 96%    Weight: 167 lb (75.8 kg)    Height: 5' 6\" (1.676 m)      Body mass index is 26.95 kg/(m^2). Exam:   Appearance: alert, well appearing,  oriented to person, place, and time, acyanotic, in no respiratory distress and well hydrated. HEENT:  NC/AT, pink conj, anicteric sclerae  Neck:  No cervical lymphadenopathy, no JVD, no thyromegaly, no carotid bruit  Heart:  RRR without M/R/G  Lungs:  CTAB, no rhonchi, rales, or wheezes with good air exchange   Abdomen:  Non-tender, pos bowel sounds, no hepatosplenomegaly  Ext:  No C/C/E    Skin: no rash  Neuro: no lateralizing signs, CNs II-XII intact      Assessment/ Plan:   Diagnoses and all orders for this visit:    1. Type 2 diabetes mellitus with hyperglycemia, without long-term current use of insulin (HCC)-on Metformin  -     HEMOGLOBIN A1C W/O EAG; Future    2. Subclinical hyperthyroidism-follow TSH every 6 months  -     TSH 3RD GENERATION; Future    3. Essential hypertension-controlled, continue with ACEI/diuretic, Norvasc  -     LIPID PANEL; Future  -     CBC WITH AUTOMATED DIFF; Future  -     METABOLIC PANEL, COMPREHENSIVE; Future    4. Advanced directives, counseling/discussion    5. Smoking-smoking cessation strongly encouraged  -     CT LOW DOSE LUNG CANCER SCREENING; Future    6. Plantar fasciitis-given exercises to do    7.  Overweight (BMI 25.0-29. 9)-discussed ideal weight with him; decrease carb intake        Follow-up Disposition:  Return in about 3 months (around 8/21/2018) for follow up. I have discussed the diagnosis with the patient and the intended plan as seen in the above orders. The patient has received an After-Visit Summary and questions were answered concerning future plans. Medication Side Effects and Warnings were discussed with patient: yes    Patient verbalized understanding of above instructions.     Aries Estrella MD  Internal Medicine  [facility]

## 2018-05-21 NOTE — ACP (ADVANCE CARE PLANNING)
Advance Care Planning (ACP) Provider Conversation Snapshot    Date of ACP Conversation: 05/21/18  Persons included in Conversation:  patient  Length of ACP Conversation in minutes:  <16 minutes (Non-Billable)    Authorized Decision Maker (if patient is incapable of making informed decisions):    This person is:   his wife Mihai Orantes          For Patients with Decision Making Capacity:   Values/Goals: Exploration of values, goals, and preferences if recovery is not expected, even with continued medical treatment in the event of:  Imminent death  Severe, permanent brain injury    Conversation Outcomes / Follow-Up Plan:   Recommended completion of Advance Directive form after review of ACP materials and conversation with prospective healthcare agent

## 2018-05-21 NOTE — PATIENT INSTRUCTIONS
Plantar Fasciitis: Exercises  Your Care Instructions  Here are some examples of typical rehabilitation exercises for your condition. Start each exercise slowly. Ease off the exercise if you start to have pain. Your doctor or physical therapist will tell you when you can start these exercises and which ones will work best for you. How to do the exercises  Towel stretch    1. Sit with your legs extended and knees straight. 2. Place a towel around your foot just under the toes. 3. Hold each end of the towel in each hand, with your hands above your knees. 4. Pull back with the towel so that your foot stretches toward you. 5. Hold the position for at least 15 to 30 seconds. 6. Repeat 2 to 4 times a session, up to 5 sessions a day. Calf stretch    This exercise stretches the muscles at the back of the lower leg (the calf) and the Achilles tendon. Do this exercise 3 or 4 times a day, 5 days a week. 1. Stand facing a wall with your hands on the wall at about eye level. Put the leg you want to stretch about a step behind your other leg. 2. Keeping your back heel on the floor, bend your front knee until you feel a stretch in the back leg. 3. Hold the stretch for 15 to 30 seconds. Repeat 2 to 4 times. Plantar fascia and calf stretch    Stretching the plantar fascia and calf muscles can increase flexibility and decrease heel pain. You can do this exercise several times each day and before and after activity. 1. Stand on a step as shown above. Be sure to hold on to the banister. 2. Slowly let your heels down over the edge of the step as you relax your calf muscles. You should feel a gentle stretch across the bottom of your foot and up the back of your leg to your knee. 3. Hold the stretch about 15 to 30 seconds, and then tighten your calf muscle a little to bring your heel back up to the level of the step. Repeat 2 to 4 times.   Towel curls    Make this exercise more challenging by placing a weighted object, such as a soup can, on the other end of the towel. 1. While sitting, place your foot on a towel on the floor and scrunch the towel toward you with your toes. 2. Then, also using your toes, push the towel away from you. [place] pickups    1. Put marbles on the floor next to a cup.  2. Using your toes, try to lift the marbles up from the floor and put them in the cup. Follow-up care is a key part of your treatment and safety. Be sure to make and go to all appointments, and call your doctor if you are having problems. It's also a good idea to know your test results and keep a list of the medicines you take. Where can you learn more? Go to http://dino-ajith.info/. Gael Newman in the search box to learn more about \"Plantar Fasciitis: Exercises. \"  Current as of: March 21, 2017  Content Version: 11.4  © 5133-9925 Healthwise, Incorporated. Care instructions adapted under license by [employer] (which disclaims liability or warranty for this information). If you have questions about a medical condition or this instruction, always ask your healthcare professional. [place] [address] any warranty or liability for your use of this information.

## 2018-05-22 LAB
A-G RATIO,AGRAT: 1.7 RATIO (ref 1.1–2.6)
ALBUMIN SERPL-MCNC: 4.6 G/DL (ref 3.5–5)
ALP SERPL-CCNC: 76 U/L (ref 40–125)
ALT SERPL-CCNC: 24 U/L (ref 5–40)
ANION GAP SERPL CALC-SCNC: 14 MMOL/L
AST SERPL W P-5'-P-CCNC: 18 U/L (ref 10–37)
AVG GLU, 10930: 137 MG/DL (ref 91–123)
BASOPHILS ABS-DIF,2030: 0.2 K/UL (ref 0–0.2)
BASOPHILS ABS-DIF,2030: 0.2 K/UL (ref 0–0.2)
BASOPHILS%-DIF,2033: 2 % (ref 0–2)
BASOPHILS%-DIF,2033: 2 % (ref 0–2)
BILIRUB SERPL-MCNC: 0.6 MG/DL (ref 0.2–1.2)
BUN SERPL-MCNC: 17 MG/DL (ref 6–22)
CALCIUM SERPL-MCNC: 9.7 MG/DL (ref 8.4–10.4)
CHLORIDE SERPL-SCNC: 101 MMOL/L (ref 98–110)
CHOLEST SERPL-MCNC: 184 MG/DL (ref 110–200)
CO2 SERPL-SCNC: 27 MMOL/L (ref 20–32)
CREAT SERPL-MCNC: 1 MG/DL (ref 0.8–1.6)
ERYTHROCYTE [DISTWIDTH] IN BLOOD BY AUTOMATED COUNT: 14.7 % (ref 10–15.5)
GFRAA, 66117: >60
GFRNA, 66118: >60
GLOBULIN,GLOB: 2.7 G/DL (ref 2–4)
GLUCOSE SERPL-MCNC: 114 MG/DL (ref 70–99)
HBA1C MFR BLD HPLC: 6.4 % (ref 4.8–5.9)
HCT VFR BLD AUTO: 46.3 % (ref 37.8–52.2)
HDLC SERPL-MCNC: 4.2 MG/DL (ref 0–5)
HDLC SERPL-MCNC: 44 MG/DL (ref 40–59)
HGB BLD-MCNC: 15.7 G/DL (ref 12.6–17.1)
LDLC SERPL CALC-MCNC: 118 MG/DL (ref 50–99)
LYMPHOCYTES%-DIF,2108: 46 % (ref 20–45)
LYMPHOCYTES%-DIF,2108: 46 % (ref 20–45)
LYMPHS ABS-DIF,2105: 3.6 K/UL (ref 1–4.8)
LYMPHS ABS-DIF,2105: 3.6 K/UL (ref 1–4.8)
MACROCYTOSIS,MACRO: ABNORMAL
MACROCYTOSIS,MACRO: ABNORMAL
MCH RBC QN AUTO: 33 PG (ref 26–34)
MCHC RBC AUTO-ENTMCNC: 34 G/DL (ref 31–36)
MCV RBC AUTO: 97 FL (ref 80–95)
MONOCYTES ABS-DIF,2141: 0.6 K/UL (ref 0.1–1)
MONOCYTES ABS-DIF,2141: 0.6 K/UL (ref 0.1–1)
MONOCYTES%-DIF,2144: 8 % (ref 3–12)
MONOCYTES%-DIF,2144: 8 % (ref 3–12)
NEUTROPHILS ABS,2156: 3.5 K/UL (ref 1.8–7.7)
NEUTROPHILS ABS,2156: 3.5 K/UL (ref 1.8–7.7)
NEUTROPHILS%-DIF,2159: 44 % (ref 40–75)
NEUTROPHILS%-DIF,2159: 44 % (ref 40–75)
NORMACHROMIC RBC, 868: ABNORMAL
NORMACHROMIC RBC, 868: ABNORMAL
PLATELET # BLD AUTO: 185 K/UL (ref 140–440)
PMV BLD AUTO: 12 FL (ref 9–13)
POTASSIUM SERPL-SCNC: 4.8 MMOL/L (ref 3.5–5.5)
PROT SERPL-MCNC: 7.3 G/DL (ref 6.2–8.1)
RBC # BLD AUTO: 4.79 M/UL (ref 3.8–5.8)
SMEAR EVAL, 1131: ABNORMAL
SMEAR EVAL, 1131: ABNORMAL
SODIUM SERPL-SCNC: 142 MMOL/L (ref 133–145)
TOTAL CELLS COUNTED, 12021: 100
TOTAL CELLS COUNTED, 12021: 100
TRIGL SERPL-MCNC: 108 MG/DL (ref 40–149)
TSH SERPL DL<=0.005 MIU/L-ACNC: 0.7 MCU/ML (ref 0.27–4.2)
VLDLC SERPL CALC-MCNC: 22 MG/DL (ref 8–30)
WBC # BLD AUTO: 7.9 K/UL (ref 4–11)

## 2018-05-23 NOTE — PROGRESS NOTES
pls let patient know A1C is 6.4% so diabetes is under control, continue with same dose Metformin  Cholesterol went up a bit-low cholesterol diet and recheck next visit  Thyroid test normal

## 2018-05-23 NOTE — PROGRESS NOTES
Patient is aware of lab results were normal except her cholesterol is up a bit and need to follow a low cholesterol dirt. Jake Diaz

## 2018-05-26 DIAGNOSIS — I10 ESSENTIAL HYPERTENSION WITH GOAL BLOOD PRESSURE LESS THAN 130/85: ICD-10-CM

## 2018-05-26 RX ORDER — BISOPROLOL FUMARATE AND HYDROCHLOROTHIAZIDE 10; 6.25 MG/1; MG/1
TABLET ORAL
Qty: 180 TAB | Refills: 0 | Status: SHIPPED | OUTPATIENT
Start: 2018-05-26 | End: 2018-05-26 | Stop reason: SDUPTHER

## 2018-05-26 RX ORDER — BISOPROLOL FUMARATE AND HYDROCHLOROTHIAZIDE 10; 6.25 MG/1; MG/1
TABLET ORAL
Qty: 30 TAB | Refills: 0 | Status: SHIPPED | OUTPATIENT
Start: 2018-05-26 | End: 2019-01-09 | Stop reason: SDUPTHER

## 2018-08-20 ENCOUNTER — OFFICE VISIT (OUTPATIENT)
Dept: FAMILY MEDICINE CLINIC | Age: 68
End: 2018-08-20

## 2018-08-20 VITALS
BODY MASS INDEX: 27.23 KG/M2 | DIASTOLIC BLOOD PRESSURE: 80 MMHG | HEIGHT: 66 IN | SYSTOLIC BLOOD PRESSURE: 138 MMHG | TEMPERATURE: 96.9 F | OXYGEN SATURATION: 96 % | WEIGHT: 169.4 LBS | RESPIRATION RATE: 17 BRPM | HEART RATE: 63 BPM

## 2018-08-20 DIAGNOSIS — E78.5 HYPERLIPIDEMIA, UNSPECIFIED HYPERLIPIDEMIA TYPE: ICD-10-CM

## 2018-08-20 DIAGNOSIS — I10 ESSENTIAL HYPERTENSION: ICD-10-CM

## 2018-08-20 DIAGNOSIS — F17.200 SMOKING: ICD-10-CM

## 2018-08-20 DIAGNOSIS — E11.65 TYPE 2 DIABETES MELLITUS WITH HYPERGLYCEMIA, WITHOUT LONG-TERM CURRENT USE OF INSULIN (HCC): Primary | ICD-10-CM

## 2018-08-20 DIAGNOSIS — E05.90 SUBCLINICAL HYPERTHYROIDISM: ICD-10-CM

## 2018-08-20 NOTE — PROGRESS NOTES
Chief Complaint   Patient presents with    Follow Up Chronic Condition     3 month due for A1c check; patient is not fasting       Pt is a 79y.o. year old male who presents for follow up of his chronic medical problems    Health Maintenance Due   Topic Date Due    EYE EXAM RETINAL OR DILATED Q1  07/12/2018-appt in Sept    Influenza Age 9 to Adult  08/01/2018     BMI 27  Wt Readings from Last 3 Encounters:   08/20/18 169 lb 6.4 oz (76.8 kg)   05/21/18 167 lb (75.8 kg)   02/20/18 170 lb (77.1 kg)       BP Readings from Last 3 Encounters:   08/20/18 158/85   05/21/18 128/68   02/20/18 137/77   Repeat BP-  Has been eating a lot of salty food    Last Point of Care HGB A1C  Hemoglobin A1c (POC)   Date Value Ref Range Status   02/20/2018 6.3 % Final      Lab Results   Component Value Date/Time    TSH 0.70 05/21/2018 11:28 AM     Lab Results   Component Value Date/Time    Cholesterol, total 184 05/21/2018 11:28 AM    HDL Cholesterol 44 05/21/2018 11:28 AM    LDL, calculated 118 (H) 05/21/2018 11:28 AM    VLDL, calculated 22 05/21/2018 11:28 AM    Triglyceride 108 05/21/2018 11:28 AM    CHOL/HDL Ratio 4.1 07/20/2010 09:50 AM     Ins did not pay for Chantix    ROS:    Pt denies: Wt loss, Fever/Chills, HA, Visual changes, Fatigue, Chest pain, SOB, SALMON, Abd pain, N/V/D/C, Blood in stool or urine, Edema. Pertinent positive as above in HPI. All others were negative    Patient Active Problem List   Diagnosis Code    Smoking F17.200    Essential hypertension I10    Subclinical hyperthyroidism E05.90    Type 2 diabetes mellitus with hyperglycemia, without long-term current use of insulin (HCC) E11.65    Primary osteoarthritis of left knee M17.12    Overweight (BMI 25.0-29. 9) E66.3    Advanced directives, counseling/discussion Z71.89       Past Medical History:   Diagnosis Date    Hypertension        Current Outpatient Prescriptions   Medication Sig Dispense Refill    amLODIPine (NORVASC) 5 mg tablet TAKE 1 TABLET BY MOUTH  DAILY 90 Tab 3    metFORMIN (GLUCOPHAGE) 500 mg tablet TAKE 1 TABLET BY MOUTH  DAILY WITH DINNER 90 Tab 2    bisoprolol-hydroCHLOROthiazide (ZIAC) 10-6.25 mg per tablet TAKE 2 TABLETS BY MOUTH  DAILY 30 Tab 0    aspirin 81 mg chewable tablet Take 81 mg by mouth daily.  varenicline (CHANTIX) 0.5 mg (11)- 1 mg (42) DsPk Use as directed in the pack 1 Dose Pack 2       History   Smoking Status    Light Tobacco Smoker   Smokeless Tobacco    Never Used       No Known Allergies    Patient Labs were reviewed: yes      Patient Past Records were reviewed:  yes        Objective:     Vitals:    08/20/18 1050 08/20/18 1134   BP: 158/85 138/80   Pulse: 63    Resp: 17    Temp: 96.9 °F (36.1 °C)    TempSrc: Oral    SpO2: 96%    Weight: 169 lb 6.4 oz (76.8 kg)    Height: 5' 6\" (1.676 m)      Body mass index is 27.34 kg/(m^2). Exam:   Appearance: alert, well appearing,  oriented to person, place, and time, acyanotic, in no respiratory distress and well hydrated. HEENT:  NC/AT, pink conj, anicteric sclerae  Neck:  No cervical lymphadenopathy, no JVD, no thyromegaly, no carotid bruit  Heart:  RRR without M/R/G  Lungs:  CTAB, no rhonchi, rales, or wheezes with good air exchange   Abdomen:  Non-tender, pos bowel sounds, no hepatosplenomegaly  Ext:  No C/C/E    Skin: no rash  Neuro: no lateralizing signs, CNs II-XII intact      Assessment/ Plan:   Diagnoses and all orders for this visit:    1. Type 2 diabetes mellitus with hyperglycemia, without long-term current use of insulin (HCC)-continue with Metformin  -     HEMOGLOBIN A1C W/O EAG; Future    2. Subclinical hyperthyroidism-no sxs, follow up TSH Q 6 months  -     TSH 3RD GENERATION; Future    3. Essential hypertension-controlled, continue with present meds; advised on low sodium diet    4. Smoking-smoking cessation advised; discussed advantages and disadvantages of low dose Ct scan for lung Ca screening  -     CT LOW DOSE LUNG CANCER SCREENING; Future    5. Hyperlipidemia, unspecified hyperlipidemia type-low cholesterol diet advised  -     LIPID PANEL; Future        Follow-up Disposition:  Return in about 3 months (around 11/20/2018) for follow up. I have discussed the diagnosis with the patient and the intended plan as seen in the above orders. The patient has received an After-Visit Summary and questions were answered concerning future plans. Medication Side Effects and Warnings were discussed with patient: yes    Patient verbalized understanding of above instructions.     Gomez Butler MD  Internal Medicine  [facility]

## 2018-08-20 NOTE — MR AVS SNAPSHOT
Miah Benson [place] [address] [facility] [address] [facility] [address] [id number] 
[phone] Patient: Octavia Kwok MRN: [id number] XNP:[id number] Visit Information Date & Time Provider Department Dept. Phone Encounter #  
 8/20/2018 10:45 AM Jen Trent MD Marsha 13 [id number] Follow-up Instructions Return in about 3 months (around 11/20/2018) for follow up. Upcoming Health Maintenance Date Due  
 EYE EXAM RETINAL OR DILATED Q1 7/12/2018 Influenza Age 5 to Adult 8/1/2018 HEMOGLOBIN A1C Q6M 11/21/2018 MICROALBUMIN Q1 11/22/2018 LIPID PANEL Q1 5/21/2019 GLAUCOMA SCREENING Q2Y 7/12/2019 COLONOSCOPY 12/26/2019 DTaP/Tdap/Td series (2 - Td) 12/24/2024 Allergies as of 8/20/2018  Review Complete On: 8/20/2018 By: Jen Trent MD  
 No Known Allergies Current Immunizations  Reviewed on 1/19/2017 Name Date Influenza High Dose Vaccine PF 11/22/2017 11:09 AM, 10/18/2016 Influenza Vaccine 12/2/2013 Influenza Vaccine Roselynn Ed) 10/15/2015 Pneumococcal Conjugate (PCV-13) 10/15/2015 Pneumococcal Polysaccharide (PPSV-23) 1/19/2017 Tdap 12/24/2014 Zoster Vaccine, Live 1/29/2016 Not reviewed this visit You Were Diagnosed With   
  
 Codes Comments Type 2 diabetes mellitus with hyperglycemia, without long-term current use of insulin (HCC)    -  Primary ICD-10-CM: E11.65 ICD-9-CM: 250.00, 790.29 Subclinical hyperthyroidism     ICD-10-CM: E05.90 ICD-9-CM: 242.90 Essential hypertension     ICD-10-CM: I10 
ICD-9-CM: 401.9 Smoking     ICD-10-CM: F17.200 ICD-9-CM: 305.1 Hyperlipidemia, unspecified hyperlipidemia type     ICD-10-CM: E78.5 ICD-9-CM: 272.4 Vitals BP Pulse Temp Resp Height(growth percentile) Weight(growth percentile) 138/80 63 96.9 °F (36.1 °C) (Oral) 17 5' 6\" (1.676 m) 169 lb 6.4 oz (76.8 kg) SpO2 BMI Smoking Status 96% 27.34 kg/m2 Light Tobacco Smoker Vitals History BMI and BSA Data Body Mass Index Body Surface Area  
 27.34 kg/m 2 1.89 m 2 Preferred Pharmacy Pharmacy Name Phone [address] [facility], [address] [address] [place] [address] [address] Melvi Nichols Your Updated Medication List  
  
   
This list is accurate as of 8/20/18 11:40 AM.  Always use your most recent med list. amLODIPine 5 mg tablet Commonly known as:  Daisy Pace TAKE 1 TABLET BY MOUTH  DAILY  
  
 aspirin 81 mg chewable tablet Take 81 mg by mouth daily. bisoprolol-hydroCHLOROthiazide 10-6.25 mg per tablet Commonly known as:  formerly [facility] TAKE 2 TABLETS BY MOUTH  DAILY  
  
 metFORMIN 500 mg tablet Commonly known as:  GLUCOPHAGE  
TAKE 1 TABLET BY MOUTH  DAILY WITH DINNER  
  
 varenicline 0.5 mg (11)- 1 mg (42) Dspk Commonly known as:  Chantix Use as directed in the pack Follow-up Instructions Return in about 3 months (around 11/20/2018) for follow up. To-Do List   
 08/20/2018 Lab:  HEMOGLOBIN A1C W/O EAG   
  
 08/20/2018 Lab:  LIPID PANEL   
  
 08/20/2018 Lab:  TSH 3RD GENERATION Patient Instructions DASH Diet: Care Instructions Your Care Instructions The DASH diet is an eating plan that can help lower your blood pressure. DASH stands for Dietary Approaches to Stop Hypertension. Hypertension is high blood pressure. The DASH diet focuses on eating foods that are high in calcium, potassium, and magnesium. These nutrients can lower blood pressure. The foods that are highest in these nutrients are fruits, vegetables, low-fat dairy products, nuts, seeds, and legumes. But taking calcium, potassium, and magnesium supplements instead of eating foods that are high in those nutrients does not have the same effect. The DASH diet also includes whole grains, fish, and poultry.  
The DASH diet is one of several lifestyle changes your doctor may recommend to lower your high blood pressure. Your doctor may also want you to decrease the amount of sodium in your diet. Lowering sodium while following the DASH diet can lower blood pressure even further than just the DASH diet alone. Follow-up care is a key part of your treatment and safety. Be sure to make and go to all appointments, and call your doctor if you are having problems. It's also a good idea to know your test results and keep a list of the medicines you take. How can you care for yourself at home? Following the DASH diet · Eat 4 to 5 servings of fruit each day. A serving is 1 medium-sized piece of fruit, ½ cup chopped or canned fruit, 1/4 cup dried fruit, or 4 ounces (½ cup) of fruit juice. Choose fruit more often than fruit juice. · Eat 4 to 5 servings of vegetables each day. A serving is 1 cup of lettuce or raw leafy vegetables, ½ cup of chopped or cooked vegetables, or 4 ounces (½ cup) of vegetable juice. Choose vegetables more often than vegetable juice. · Get 2 to 3 servings of low-fat and fat-free dairy each day. A serving is 8 ounces of milk, 1 cup of yogurt, or 1 ½ ounces of cheese. · Eat 6 to 8 servings of grains each day. A serving is 1 slice of bread, 1 ounce of dry cereal, or ½ cup of cooked rice, pasta, or cooked cereal. Try to choose whole-grain products as much as possible. · Limit lean meat, poultry, and fish to 2 servings each day. A serving is 3 ounces, about the size of a deck of cards. · Eat 4 to 5 servings of nuts, seeds, and legumes (cooked dried beans, lentils, and split peas) each week. A serving is 1/3 cup of nuts, 2 tablespoons of seeds, or ½ cup of cooked beans or peas. · Limit fats and oils to 2 to 3 servings each day. A serving is 1 teaspoon of vegetable oil or 2 tablespoons of salad dressing. · Limit sweets and added sugars to 5 servings or less a week. A serving is 1 tablespoon jelly or jam, ½ cup sorbet, or 1 cup of lemonade. · Eat less than 2,300 milligrams (mg) of sodium a day. If you limit your sodium to 1,500 mg a day, you can lower your blood pressure even more. Tips for success · Start small. Do not try to make dramatic changes to your diet all at once. You might feel that you are missing out on your favorite foods and then be more likely to not follow the plan. Make small changes, and stick with them. Once those changes become habit, add a few more changes. · Try some of the following: ¨ Make it a goal to eat a fruit or vegetable at every meal and at snacks. This will make it easy to get the recommended amount of fruits and vegetables each day. ¨ Try yogurt topped with fruit and nuts for a snack or healthy dessert. ¨ Add lettuce, tomato, cucumber, and onion to sandwiches. ¨ Combine a ready-made pizza crust with low-fat mozzarella cheese and lots of vegetable toppings. Try using tomatoes, squash, spinach, broccoli, carrots, cauliflower, and onions. ¨ Have a variety of cut-up vegetables with a low-fat dip as an appetizer instead of chips and dip. ¨ Sprinkle sunflower seeds or chopped almonds over salads. Or try adding chopped walnuts or almonds to cooked vegetables. ¨ Try some vegetarian meals using beans and peas. Add garbanzo or kidney beans to salads. Make burritos and tacos with mashed [place] beans or black beans. Where can you learn more? Go to http://dino-ajith.info/. Enter F196 in the search box to learn more about \"DASH Diet: Care Instructions. \" Current as of: December 6, 2017 Content Version: 11.7 © 8800-3543 [employer]. Care instructions adapted under license by [employer] (which disclaims liability or warranty for this information). If you have questions about a medical condition or this instruction, always ask your healthcare professional. [address] any warranty or liability for your use of this information. Introducing [address]! [facility] introduces Lucky Sortt patient portal. Now you can access parts of your medical record, email your doctor's office, and request medication refills online. 1. In your internet browser, go to [email or web address] 2. Click on the First Time User? Click Here link in the Sign In box. You will see the New Member Sign Up page. 3. Enter your [employer] Access Code exactly as it appears below. You will not need to use this code after youve completed the sign-up process. If you do not sign up before the expiration date, you must request a new code. · [employer] Access Code: [email or web address] Expires: 8/20/2018 12:03 PM 
 
4. Enter the last four digits of your Social Security Number (xxxx) and Date of Birth (mm/dd/yyyy) as indicated and click Submit. You will be taken to the next sign-up page. 5. Create a [employer] ID. This will be your [employer] login ID and cannot be changed, so think of one that is secure and easy to remember. 6. Create a [employer] password. You can change your password at any time. 7. Enter your Password Reset Question and Answer. This can be used at a later time if you forget your password. 8. Enter your e-mail address. You will receive e-mail notification when new information is available in [address] 9. Click Sign Up. You can now view and download portions of your medical record. 10. Click the Download Summary menu link to download a portable copy of your medical information. If you have questions, please visit the Frequently Asked Questions section of the [employer] website. Remember, [employer] is NOT to be used for urgent needs. For medical emergencies, dial 911. Now available from your iPhone and Android! Please provide this summary of care documentation to your next provider. Your primary care clinician is listed as Mook Fisher. If you have any questions after today's visit, please call [phone].

## 2018-08-20 NOTE — PROGRESS NOTES
Chief Complaint   Patient presents with    Follow Up Chronic Condition     3 month due for A1c check; patient is not fasting     1. Have you been to the ER, urgent care clinic since your last visit? Hospitalized since your last visit? No    2. Have you seen or consulted any other health care providers outside of the [address] [place] [address] since your last visit? Include any pap smears or colon screening. No     Patient scheduled for eye visit in September.

## 2018-08-20 NOTE — PATIENT INSTRUCTIONS

## 2018-08-21 LAB
AVG GLU, 10930: 140 MG/DL (ref 91–123)
HBA1C MFR BLD HPLC: 6.5 % (ref 4.8–5.9)
TSH SERPL DL<=0.005 MIU/L-ACNC: 0.82 MCU/ML (ref 0.27–4.2)

## 2018-11-27 ENCOUNTER — OFFICE VISIT (OUTPATIENT)
Dept: FAMILY MEDICINE CLINIC | Age: 68
End: 2018-11-27

## 2018-11-27 VITALS
HEART RATE: 68 BPM | WEIGHT: 174.2 LBS | SYSTOLIC BLOOD PRESSURE: 140 MMHG | TEMPERATURE: 97.9 F | OXYGEN SATURATION: 96 % | DIASTOLIC BLOOD PRESSURE: 72 MMHG | HEIGHT: 66 IN | BODY MASS INDEX: 28 KG/M2 | RESPIRATION RATE: 18 BRPM

## 2018-11-27 DIAGNOSIS — I10 ESSENTIAL HYPERTENSION: ICD-10-CM

## 2018-11-27 DIAGNOSIS — E11.65 TYPE 2 DIABETES MELLITUS WITH HYPERGLYCEMIA, WITHOUT LONG-TERM CURRENT USE OF INSULIN (HCC): Primary | ICD-10-CM

## 2018-11-27 DIAGNOSIS — F17.200 SMOKING: ICD-10-CM

## 2018-11-27 DIAGNOSIS — Z23 ENCOUNTER FOR IMMUNIZATION: ICD-10-CM

## 2018-11-27 DIAGNOSIS — E66.3 OVERWEIGHT (BMI 25.0-29.9): ICD-10-CM

## 2018-11-27 DIAGNOSIS — E78.5 HYPERLIPIDEMIA, UNSPECIFIED HYPERLIPIDEMIA TYPE: ICD-10-CM

## 2018-11-27 DIAGNOSIS — E05.90 SUBCLINICAL HYPERTHYROIDISM: ICD-10-CM

## 2018-11-27 DIAGNOSIS — M25.512 CHRONIC LEFT SHOULDER PAIN: ICD-10-CM

## 2018-11-27 DIAGNOSIS — G89.29 CHRONIC LEFT SHOULDER PAIN: ICD-10-CM

## 2018-11-27 RX ORDER — VARENICLINE TARTRATE 25 MG
KIT ORAL
Qty: 1 DOSE PACK | Refills: 2 | Status: SHIPPED | OUTPATIENT
Start: 2018-11-27 | End: 2019-04-10 | Stop reason: SDUPTHER

## 2018-11-27 NOTE — PATIENT INSTRUCTIONS

## 2018-11-27 NOTE — PROGRESS NOTES
Chief Complaint   Patient presents with    Follow Up Chronic Condition     3 month; patient not fasting (coffee and candy)    Immunization/Injection     Influenza vaccine       Pt is a 76y.o. year old male who presents for follow up of his chronic medical problems    BP Readings from Last 3 Encounters:   11/27/18 156/83   08/20/18 138/80   05/21/18 128/68   Repeat BP-    Still smoking, Chantix not paid by hs insurance    Lab Results   Component Value Date/Time    Cholesterol, total 184 05/21/2018 11:28 AM    HDL Cholesterol 44 05/21/2018 11:28 AM    LDL, calculated 118 (H) 05/21/2018 11:28 AM    VLDL, calculated 22 05/21/2018 11:28 AM    Triglyceride 108 05/21/2018 11:28 AM    CHOL/HDL Ratio 4.1 07/20/2010 09:50 AM       Lab Results   Component Value Date/Time    TSH 0.82 08/20/2018 11:44 AM     Left shoulder pain when reaching, overhead activities  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 Pertinent positive as above in HPI. All others were negative    Patient Active Problem List   Diagnosis Code    Smoking F17.200    Essential hypertension I10    Subclinical hyperthyroidism E05.90    Type 2 diabetes mellitus with hyperglycemia, without long-term current use of insulin (HCC) E11.65    Primary osteoarthritis of left knee M17.12    Overweight (BMI 25.0-29. 9) E66.3    Advanced directives, counseling/discussion Z71.89       Past Medical History:   Diagnosis Date    Hypertension        Current Outpatient Medications   Medication Sig Dispense Refill    varenicline (CHANTIX) 0.5 mg (11)- 1 mg (42) DsPk Use as directed in the pack 1 Dose Pack 2    amLODIPine (NORVASC) 5 mg tablet TAKE 1 TABLET BY MOUTH  DAILY 90 Tab 3    metFORMIN (GLUCOPHAGE) 500 mg tablet TAKE 1 TABLET BY MOUTH  DAILY WITH DINNER 90 Tab 2    bisoprolol-hydroCHLOROthiazide (ZIAC) 10-6.25 mg per tablet TAKE 2 TABLETS BY MOUTH  DAILY 30 Tab 0    aspirin 81 mg chewable tablet Take 81 mg by mouth daily. Social History     Tobacco Use   Smoking Status Light Tobacco Smoker   Smokeless Tobacco Never Used       No Known Allergies    Patient Labs were reviewed: yes      Patient Past Records were reviewed:  yes        Objective:     Vitals:    11/27/18 1113 11/27/18 1220   BP: 156/83 140/72   Pulse: 68    Resp: 18    Temp: 97.9 °F (36.6 °C)    TempSrc: Oral    SpO2: 96%    Weight: 174 lb 3.2 oz (79 kg)    Height: 5' 6\" (1.676 m)      Body mass index is 28.12 kg/m². Exam:   Appearance: alert, well appearing,  oriented to person, place, and time, acyanotic, in no respiratory distress and well hydrated. HEENT:  NC/AT, pink conj, anicteric sclerae  Neck:  No cervical lymphadenopathy, no JVD, no thyromegaly, no carotid bruit  Heart:  RRR without M/R/G  Lungs:  CTAB, no rhonchi, rales, or wheezes with good air exchange   Abdomen:  Non-tender, pos bowel sounds, no hepatosplenomegaly  Ext:  No C/C/, decreased ROM of the left shoulder on abduction and adduction    Skin: no rash  Neuro: no lateralizing signs, CNs II-XII intact    Last Point of Care HGB A1C  Hemoglobin A1c (POC)   Date Value Ref Range Status   11/27/2018 7.9 % Final      Assessment/ Plan:   Diagnoses and all orders for this visit:    1. Type 2 diabetes mellitus with hyperglycemia, without long-term current use of insulin (HCC)  -     AMB POC HEMOGLOBIN A1C  microalb next visit    2. Essential hypertension-controlled, continue with present meds    3. Subclinical hyperthyroidism-asymptomatic, check TFTs next visit    4. Hyperlipidemia, unspecified hyperlipidemia -check fasting lipids next visit and if LDL is still above 100, will start statin; low fat diet discuseed    5. Smoking  -     varenicline (CHANTIX) 0.5 mg (11)- 1 mg (42) DsPk; Use as directed in the pack    6. Chronic left shoulder pain  -     REFERRAL TO PHYSICAL THERAPY    7. Encounter for immunization  -     INFLUENZA VACCINE INACTIVATED (IIV), SUBUNIT, ADJUVANTED, IM  -     ADMIN INFLUENZA VIRUS VAC    8. Overweight (BMI 25.0-29. 9)-discussed limiting tyree to 3591-5266/day and exercising at least 150 min a week        Follow-up Disposition:  Return in about 3 months (around 2/27/2019) for annual wellness-welcome to medicare exam.        I have discussed the diagnosis with the patient and the intended plan as seen in the above orders. The patient has received an After-Visit Summary and questions were answered concerning future plans. Medication Side Effects and Warnings were discussed with patient: yes    Patient verbalized understanding of above instructions.     Shereen Moser MD  Internal Medicine  [facility]

## 2018-11-27 NOTE — PROGRESS NOTES
Chief Complaint   Patient presents with    Follow Up Chronic Condition     3 month; patient not fasting (coffee and candy)    Immunization/Injection     Influenza vaccine     1. Have you been to the ER, urgent care clinic since your last visit? Hospitalized since your last visit? No    2. Have you seen or consulted any other health care providers outside of the [address] [place] [address] since your last visit? Include any pap smears or colon screening. No     A1C check is due.

## 2018-11-29 LAB — HBA1C MFR BLD HPLC: 7.9 %

## 2019-01-29 DIAGNOSIS — E11.65 TYPE 2 DIABETES MELLITUS WITH HYPERGLYCEMIA, WITHOUT LONG-TERM CURRENT USE OF INSULIN (HCC): ICD-10-CM

## 2019-04-10 ENCOUNTER — OFFICE VISIT (OUTPATIENT)
Dept: FAMILY MEDICINE CLINIC | Age: 69
End: 2019-04-10

## 2019-04-10 VITALS
HEART RATE: 69 BPM | RESPIRATION RATE: 18 BRPM | HEIGHT: 66 IN | OXYGEN SATURATION: 96 % | BODY MASS INDEX: 27.16 KG/M2 | WEIGHT: 169 LBS | SYSTOLIC BLOOD PRESSURE: 140 MMHG | TEMPERATURE: 97.6 F | DIASTOLIC BLOOD PRESSURE: 80 MMHG

## 2019-04-10 DIAGNOSIS — E05.90 SUBCLINICAL HYPERTHYROIDISM: ICD-10-CM

## 2019-04-10 DIAGNOSIS — E55.9 VITAMIN D DEFICIENCY: ICD-10-CM

## 2019-04-10 DIAGNOSIS — F17.200 SMOKING: ICD-10-CM

## 2019-04-10 DIAGNOSIS — M54.12 CERVICAL RADICULITIS: ICD-10-CM

## 2019-04-10 DIAGNOSIS — Z87.891 PERSONAL HISTORY OF TOBACCO USE, PRESENTING HAZARDS TO HEALTH: ICD-10-CM

## 2019-04-10 DIAGNOSIS — E66.3 OVERWEIGHT (BMI 25.0-29.9): ICD-10-CM

## 2019-04-10 DIAGNOSIS — I10 ESSENTIAL HYPERTENSION WITH GOAL BLOOD PRESSURE LESS THAN 130/85: ICD-10-CM

## 2019-04-10 DIAGNOSIS — E11.65 TYPE 2 DIABETES MELLITUS WITH HYPERGLYCEMIA, WITHOUT LONG-TERM CURRENT USE OF INSULIN (HCC): ICD-10-CM

## 2019-04-10 DIAGNOSIS — Z12.5 SPECIAL SCREENING FOR MALIGNANT NEOPLASM OF PROSTATE: ICD-10-CM

## 2019-04-10 DIAGNOSIS — E78.5 HYPERLIPIDEMIA, UNSPECIFIED HYPERLIPIDEMIA TYPE: ICD-10-CM

## 2019-04-10 DIAGNOSIS — Z71.89 ADVANCED DIRECTIVES, COUNSELING/DISCUSSION: ICD-10-CM

## 2019-04-10 DIAGNOSIS — Z13.6 SCREENING FOR CARDIOVASCULAR CONDITION: ICD-10-CM

## 2019-04-10 DIAGNOSIS — Z00.00 MEDICARE ANNUAL WELLNESS VISIT, SUBSEQUENT: Primary | ICD-10-CM

## 2019-04-10 LAB
MICROALBUMIN UR TEST STR-MCNC: 80 MG/L
MICROALBUMIN/CREAT RATIO POC: <30 MG/G

## 2019-04-10 RX ORDER — METFORMIN HYDROCHLORIDE 500 MG/1
500 TABLET ORAL
Qty: 90 TAB | Refills: 2 | Status: SHIPPED | OUTPATIENT
Start: 2019-04-10 | End: 2019-05-03 | Stop reason: SDUPTHER

## 2019-04-10 RX ORDER — VARENICLINE TARTRATE 25 MG
KIT ORAL
Qty: 1 DOSE PACK | Refills: 2 | Status: SHIPPED | OUTPATIENT
Start: 2019-04-10 | End: 2019-06-17 | Stop reason: SDUPTHER

## 2019-04-10 RX ORDER — AMLODIPINE BESYLATE 5 MG/1
5 TABLET ORAL DAILY
Qty: 90 TAB | Refills: 2 | Status: SHIPPED | OUTPATIENT
Start: 2019-04-10 | End: 2019-05-03 | Stop reason: SDUPTHER

## 2019-04-10 NOTE — PATIENT INSTRUCTIONS
Medicare Wellness Visit, Male    The best way to live healthy is to have a lifestyle where you eat a well-balanced diet, exercise regularly, limit alcohol use, and quit all forms of tobacco/nicotine, if applicable. Regular preventive services are another way to keep healthy. Preventive services (vaccines, screening tests, monitoring & exams) can help personalize your care plan, which helps you manage your own care. Screening tests can find health problems at the earliest stages, when they are easiest to treat. [address] [facility] follows the current, evidence-based guidelines published by the [facility] ([facility]) when recommending preventive services for our patients. Because we follow these guidelines, sometimes recommendations change over time as research supports it. (For example, a prostate screening blood test is no longer routinely recommended for men with no symptoms.)  Of course, you and your doctor may decide to screen more often for some diseases, based on your risk and co-morbidities (chronic disease you are already diagnosed with). Preventive services for you include:  - Medicare offers their members a free annual wellness visit, which is time for you and your primary care provider to discuss and plan for your preventive service needs. Take advantage of this benefit every year!  -All adults over age 72 should receive the recommended pneumonia vaccines. Current USPSTF guidelines recommend a series of two vaccines for the best pneumonia protection.   -All adults should have a flu vaccine yearly and an ECG.  All adults age 61 and older should receive a shingles vaccine once in their lifetime.    -All adults age 38-68 who are overweight should have a diabetes screening test once every three years.   -Other screening tests & preventive services for persons with diabetes include: an eye exam to screen for diabetic retinopathy, a kidney function test, a foot exam, and stricter control over your cholesterol.   -Cardiovascular screening for adults with routine risk involves an electrocardiogram (ECG) at intervals determined by the provider.   -Colorectal cancer screening should be done for adults age 54-65 with no increased risk factors for colorectal cancer. There are a number of acceptable methods of screening for this type of cancer. Each test has its own benefits and drawbacks. Discuss with your provider what is most appropriate for you during your annual wellness visit. The different tests include: colonoscopy (considered the best screening method), a fecal occult blood test, a fecal DNA test, and sigmoidoscopy.  -All adults born between [facility] should be screened once for Hepatitis C.  -An Abdominal Aortic Aneurysm (AAA) Screening is recommended for men age 73-68 who has ever smoked in their lifetime. Here is a list of your current Health Maintenance items (your personalized list of preventive services) with a due date:  Health Maintenance Due   Topic Date Due    Shingles Vaccine (1 of 2) 09/21/2000    Annual Well Visit  02/21/2019     Medicare Wellness Visit, Male    The best way to live healthy is to have a lifestyle where you eat a well-balanced diet, exercise regularly, limit alcohol use, and quit all forms of tobacco/nicotine, if applicable. Regular preventive services are another way to keep healthy. Preventive services (vaccines, screening tests, monitoring & exams) can help personalize your care plan, which helps you manage your own care. Screening tests can find health problems at the earliest stages, when they are easiest to treat. [facility] follows the current, evidence-based guidelines published by the [facility] [employer] ([facility]STF) when recommending preventive services for our patients. Because we follow these guidelines, sometimes recommendations change over time as research supports it.  (For example, lori prostate screening blood test is no longer routinely recommended for men with no symptoms.)  Of course, you and your doctor may decide to screen more often for some diseases, based on your risk and co-morbidities (chronic disease you are already diagnosed with). Preventive services for you include:  - Medicare offers their members a free annual wellness visit, which is time for you and your primary care provider to discuss and plan for your preventive service needs. Take advantage of this benefit every year!  -All adults over age 72 should receive the recommended pneumonia vaccines. Current USPSTF guidelines recommend a series of two vaccines for the best pneumonia protection.   -All adults should have a flu vaccine yearly and an ECG. All adults age 61 and older should receive a shingles vaccine once in their lifetime.    -All adults age 38-68 who are overweight should have a diabetes screening test once every three years.   -Other screening tests & preventive services for persons with diabetes include: an eye exam to screen for diabetic retinopathy, a kidney function test, a foot exam, and stricter control over your cholesterol.   -Cardiovascular screening for adults with routine risk involves an electrocardiogram (ECG) at intervals determined by the provider.   -Colorectal cancer screening should be done for adults age 54-65 with no increased risk factors for colorectal cancer. There are a number of acceptable methods of screening for this type of cancer. Each test has its own benefits and drawbacks. Discuss with your provider what is most appropriate for you during your annual wellness visit.  The different tests include: colonoscopy (considered the best screening method), a fecal occult blood test, a fecal DNA test, and sigmoidoscopy.  -All adults born between [facility] should be screened once for Hepatitis C.  -An Abdominal Aortic Aneurysm (AAA) Screening is recommended for men age 73-68 who has ever smoked in their lifetime.      Here is a list of your current Health Maintenance items (your personalized list of preventive services) with a due date:  Health Maintenance Due   Topic Date Due    Shingles Vaccine (1 of 2) 09/21/2000    Annual Well Visit  02/21/2019

## 2019-04-10 NOTE — ACP (ADVANCE CARE PLANNING)
Advance Care Planning (ACP) Provider Conversation Snapshot    Date of ACP Conversation: 04/10/19  Persons included in Conversation:  patient and family  Length of ACP Conversation in minutes:  <16 minutes (Non-Billable)    Authorized Decision Maker (if patient is incapable of making informed decisions):    This person is:   his wife Crystal            For Patients with Decision Making Capacity:   Values/Goals: Exploration of values, goals, and preferences if recovery is not expected, even with continued medical treatment in the event of:  Imminent death  Severe, permanent brain injury    Conversation Outcomes / Follow-Up Plan:   Recommended completion of Advance Directive form after review of ACP materials and conversation with prospective healthcare agent

## 2019-04-10 NOTE — PROGRESS NOTES
Chief Complaint   Patient presents with    Follow Up Chronic Condition    Medication Refill     1. Have you been to the ER, urgent care clinic since your last visit? Hospitalized since your last visit? No    2. Have you seen or consulted any other health care providers outside of the [address] [place] [address] since your last visit? Include any pap smears or colon screening.  No

## 2019-04-10 NOTE — PROGRESS NOTES
Chief Complaint   Patient presents with    Follow Up Chronic Condition    Medication Refill    Annual Wellness Visit       Pt is a 76y.o. year old male who presents for follow up of his chronic medical problems    Wt Readings from Last 3 Encounters:   04/10/19 169 lb (76.7 kg)   11/27/18 174 lb 3.2 oz (79 kg)   08/20/18 169 lb 6.4 oz (76.8 kg)       BP Readings from Last 3 Encounters:   04/10/19 153/89   11/27/18 140/72   08/20/18 138/80    repeat BP is better    Last Point of Care HGB A1C  Hemoglobin A1c (POC)   Date Value Ref Range Status   11/27/2018 7.9 % Final    Denies polyuria, polydipsia and polyphagia      Neck pain rad to the arm  Now with numbness on the right little finger  Was treated in the [place] during vacation; given B vitamins and Pregabalin    ROS:    Pt denies: Wt loss, Fever/Chills, HA, Visual changes, Fatigue, Chest pain, SOB, SALMON, Abd pain, N/V/D/C, Blood in stool or urine, Edema. Pertinent positive as above in HPI. All others were negative    Patient Active Problem List   Diagnosis Code    Smoking F17.200    Essential hypertension I10    Subclinical hyperthyroidism E05.90    Type 2 diabetes mellitus with hyperglycemia, without long-term current use of insulin (HCC) E11.65    Primary osteoarthritis of left knee M17.12    Overweight (BMI 25.0-29. 9) E66.3    Advanced directives, counseling/discussion Z71.89       Past Medical History:   Diagnosis Date    Hypertension        Current Outpatient Medications   Medication Sig Dispense Refill    varenicline (CHANTIX) 0.5 mg (11)- 1 mg (42) DsPk Use as directed in the pack 1 Dose Pack 2    bisoprolol-hydroCHLOROthiazide (ZIAC) 10-6.25 mg per tablet TAKE 2 TABLETS BY MOUTH  DAILY 180 Tab 1    aspirin 81 mg chewable tablet Take 81 mg by mouth daily.         metFORMIN (GLUCOPHAGE) 500 mg tablet Take 1 Tab by mouth daily (with dinner). 90 Tab 2    amLODIPine (NORVASC) 5 mg tablet Take 1 Tab by mouth daily.  90 Tab 2       Social History     Tobacco Use   Smoking Status Light Tobacco Smoker   Smokeless Tobacco Never Used       No Known Allergies    Patient Labs were reviewed: yes      Patient Past Records were reviewed:  yes        Objective:     Vitals:    04/10/19 1354 04/10/19 1436   BP: 153/89 140/80   Pulse: 69    Resp: 18    Temp: 97.6 °F (36.4 °C)    TempSrc: Oral    SpO2: 96%    Weight: 169 lb (76.7 kg)    Height: 5' 6\" (1.676 m)      Body mass index is 27.28 kg/m². Exam:   Appearance: alert, well appearing,  oriented to person, place, and time, acyanotic, in no respiratory distress and well hydrated. HEENT:  NC/AT, pink conj, anicteric sclerae  Neck:  No cervical lymphadenopathy, no JVD, no thyromegaly, no carotid bruit  Heart:  RRR without M/R/G  Lungs:  CTAB, no rhonchi, rales, or wheezes with good air exchange   Abdomen:  Non-tender, pos bowel sounds, no hepatosplenomegaly  Ext:  No C/C/E    Skin: no rash  Neuro: no lateralizing signs, CNs II-XII intact  Back; reproducible pain along the lower cervical spine    Assessment/ Plan:   Diagnoses and all orders for this visit:    1. Medicare annual wellness visit, subsequent-see note below    2. Type 2 diabetes mellitus with hyperglycemia, without long-term current use of insulin (HCC)-continue with Metformin  -     AMB POC URINE, MICROALBUMIN, SEMIQUANTITATIVE  -     AMB POC HEMOGLOBIN A1C    3. Essential hypertension-controlled, continue with present meds   -     CBC WITH AUTOMATED DIFF; Future  -     METABOLIC PANEL, COMPREHENSIVE; Future    4. Hyperlipidemia, unspecified hyperlipidemia type-not on statin  -     LIPID PANEL; Future  Lab Results   Component Value Date/Time    Cholesterol, total 178 04/10/2019 02:48 AM    HDL Cholesterol 34 (L) 04/10/2019 02:48 AM    LDL, calculated 99 04/10/2019 02:48 AM    VLDL, calculated 45 (H) 04/10/2019 02:48 AM    Triglyceride 224 (H) 04/10/2019 02:48 AM    CHOL/HDL Ratio 4.1 07/20/2010 09:50 AM       5.  Subclinical hyperthyroidism  - TSH 3RD GENERATION; Future    6. Vitamin D deficiency-not on med  -     VITAMIN D, 25 HYDROXY; Future    7. Cervical radiculitis-new sx; will restart Gabaapentin if sxs persist  -     XR SPINE CERV 4 OR 5 V; Future    8. Smoking-smoking cessation discussed  -     varenicline (CHANTIX) 0.5 mg (11)- 1 mg (42) DsPk; Use as directed in the pack    9. Overweight (BMI 25.0-29. 9)-discussed limiting tyree to 7863-1214/day and exercising at least 150 min a week            Follow-up and Dispositions    · Return in about 3 months (around 7/10/2019) for follow up. I have discussed the diagnosis with the patient and the intended plan as seen in the above orders. The patient has received an After-Visit Summary and questions were answered concerning future plans. Medication Side Effects and Warnings were discussed with patient: yes    Patient verbalized understanding of above instructions. Jenn Pandey MD  Internal Medicine  [facility]    This is the Subsequent Medicare Annual Wellness Exam, performed 12 months or more after the Initial AWV or the last Subsequent AWV    I have reviewed the patient's medical history in detail and updated the computerized patient record. History     Past Medical History:   Diagnosis Date    Hypertension       History reviewed. No pertinent surgical history. Current Outpatient Medications   Medication Sig Dispense Refill    varenicline (CHANTIX) 0.5 mg (11)- 1 mg (42) DsPk Use as directed in the pack 1 Dose Pack 2    bisoprolol-hydroCHLOROthiazide (ZIAC) 10-6.25 mg per tablet TAKE 2 TABLETS BY MOUTH  DAILY 180 Tab 1    aspirin 81 mg chewable tablet Take 81 mg by mouth daily.         metFORMIN (GLUCOPHAGE) 500 mg tablet Take 1 Tab by mouth daily (with dinner). 90 Tab 2    amLODIPine (NORVASC) 5 mg tablet Take 1 Tab by mouth daily.  80 Tab 2     No Known Allergies  Family History   Problem Relation Age of Onset    Hypertension Mother    [facility] Stroke Mother     Hypertension Father     Stroke Father     Diabetes Brother      Social History     Tobacco Use    Smoking status: Light Tobacco Smoker    Smokeless tobacco: Never Used   Substance Use Topics    Alcohol use: Yes     Comment: social     Patient Active Problem List   Diagnosis Code    Smoking F17.200    Essential hypertension I10    Subclinical hyperthyroidism E05.90    Type 2 diabetes mellitus with hyperglycemia, without long-term current use of insulin (HCC) E11.65    Primary osteoarthritis of left knee M17.12    Overweight (BMI 25.0-29. 9) E66.3    Advanced directives, counseling/discussion Z71.89       Depression Risk Factor Screening:     3 most recent PHQ Screens 4/10/2019   Little interest or pleasure in doing things Not at all   Feeling down, depressed, irritable, or hopeless Not at all   Total Score PHQ 2 0     Alcohol Risk Factor Screening: You do not drink alcohol or very rarely. Functional Ability and Level of Safety:   Hearing Loss  Hearing is good. Activities of Daily Living  The home contains: no safety equipment. Patient does total self care    Fall Risk  Fall Risk Assessment, last 12 mths 4/10/2019   Able to walk? Yes   Fall in past 12 months?  No       Abuse Screen  Patient is not abused    Cognitive Screening   Evaluation of Cognitive Function:  Has your family/caregiver stated any concerns about your memory: no  Normal MMSE    Patient Care Team   Patient Care Team:  Eden Jenkins MD as PCP - General (Internal Medicine)    Assessment/Plan   Education and counseling provided:  Are appropriate based on today's review and evaluation  End-of-Life planning (with patient's consent)-see ACP note  Pneumococcal Vaccine-done  Influenza Vaccine-done  Prostate cancer screening tests (PSA, covered annually)-ordered  Colorectal cancer screening tests-up to date  Cardiovascular screening blood test-fasting lipids today  Screening for glaucoma-eye exam is up to date    Diagnoses and all orders for this visit:    1. Medicare annual wellness visit, subsequent-Refer to above for plan and to patient instructions for recommendations on HM    2. Special screening for malignant neoplasm of prostate  -     PSA SCREENING (SCREENING); Future    3. Screening for cardiovascular condition  -     US EXAM SCREENING AAA; Future    4. Personal history of tobacco use, presenting hazards to health  -     CT LOW DOSE LUNG CANCER SCREENING; Future    5. Advanced directives, counseling/discussion            Health Maintenance Due   Topic Date Due    Shingrix Vaccine Age 49> (1 of 2)-advised to get this at his pharmacy 09/21/2000     [facility] yearly for wellness visit    Discussed with patient current guidelines for screening for lung cancer. Current recommendations are to obtain yearly screening LDCT yearly for age 46-80, or until smoke free for 15 years. Patient has at least 30 pack year history of cigarette smoking and currently smoking. Discussed with patient risks and benefits of screening, including over-diagnosis, false positive rate, and total radiation exposure. Patient currently exhibits no signs or symptoms suggestive of lung cancer. Discussed with patient importance of compliance with yearly annual lung cancer screenings and willingness to undergo diagnosis and treatment if screening scan is positive. In addition, patient was counseled regarding (remaining smoke free/total smoking cessation).

## 2019-04-11 LAB
25(OH)D3+25(OH)D2 SERPL-MCNC: 23.2 NG/ML (ref 30–100)
ALBUMIN SERPL-MCNC: 4.6 G/DL (ref 3.6–4.8)
ALBUMIN/GLOB SERPL: 1.4 {RATIO} (ref 1.2–2.2)
ALP SERPL-CCNC: 87 IU/L (ref 39–117)
ALT SERPL-CCNC: 58 IU/L (ref 0–44)
AST SERPL-CCNC: 36 IU/L (ref 0–40)
BASOPHILS # BLD AUTO: 0 X10E3/UL (ref 0–0.2)
BASOPHILS NFR BLD AUTO: 1 %
BILIRUB SERPL-MCNC: 0.4 MG/DL (ref 0–1.2)
BUN SERPL-MCNC: 15 MG/DL (ref 8–27)
BUN/CREAT SERPL: 15 (ref 10–24)
CALCIUM SERPL-MCNC: 9.7 MG/DL (ref 8.6–10.2)
CHLORIDE SERPL-SCNC: 99 MMOL/L (ref 96–106)
CHOLEST SERPL-MCNC: 178 MG/DL (ref 100–199)
CO2 SERPL-SCNC: 24 MMOL/L (ref 20–29)
CREAT SERPL-MCNC: 1.02 MG/DL (ref 0.76–1.27)
EOSINOPHIL # BLD AUTO: 0.2 X10E3/UL (ref 0–0.4)
EOSINOPHIL NFR BLD AUTO: 3 %
ERYTHROCYTE [DISTWIDTH] IN BLOOD BY AUTOMATED COUNT: 15 % (ref 12.3–15.4)
GLOBULIN SER CALC-MCNC: 3.3 G/DL (ref 1.5–4.5)
GLUCOSE SERPL-MCNC: 112 MG/DL (ref 65–99)
HCT VFR BLD AUTO: 45.2 % (ref 37.5–51)
HDLC SERPL-MCNC: 34 MG/DL
HGB BLD-MCNC: 15.5 G/DL (ref 13–17.7)
IMM GRANULOCYTES # BLD AUTO: 0 X10E3/UL (ref 0–0.1)
IMM GRANULOCYTES NFR BLD AUTO: 0 %
INTERPRETATION, 910389: NORMAL
LDLC SERPL CALC-MCNC: 99 MG/DL (ref 0–99)
LYMPHOCYTES # BLD AUTO: 2.7 X10E3/UL (ref 0.7–3.1)
LYMPHOCYTES NFR BLD AUTO: 31 %
MCH RBC QN AUTO: 31.8 PG (ref 26.6–33)
MCHC RBC AUTO-ENTMCNC: 34.3 G/DL (ref 31.5–35.7)
MCV RBC AUTO: 93 FL (ref 79–97)
MONOCYTES # BLD AUTO: 0.8 X10E3/UL (ref 0.1–0.9)
MONOCYTES NFR BLD AUTO: 9 %
NEUTROPHILS # BLD AUTO: 4.8 X10E3/UL (ref 1.4–7)
NEUTROPHILS NFR BLD AUTO: 56 %
PLATELET # BLD AUTO: 211 X10E3/UL (ref 150–379)
POTASSIUM SERPL-SCNC: 5 MMOL/L (ref 3.5–5.2)
PROT SERPL-MCNC: 7.9 G/DL (ref 6–8.5)
PSA SERPL-MCNC: 1.3 NG/ML (ref 0–4)
RBC # BLD AUTO: 4.87 X10E6/UL (ref 4.14–5.8)
SODIUM SERPL-SCNC: 141 MMOL/L (ref 134–144)
TRIGL SERPL-MCNC: 224 MG/DL (ref 0–149)
TSH SERPL DL<=0.005 MIU/L-ACNC: 1.36 UIU/ML (ref 0.45–4.5)
VLDLC SERPL CALC-MCNC: 45 MG/DL (ref 5–40)
WBC # BLD AUTO: 8.6 X10E3/UL (ref 3.4–10.8)

## 2019-04-14 DIAGNOSIS — E55.9 VITAMIN D DEFICIENCY: Primary | ICD-10-CM

## 2019-04-14 RX ORDER — ERGOCALCIFEROL 1.25 MG/1
50000 CAPSULE ORAL
Qty: 12 CAP | Refills: 1 | Status: SHIPPED | OUTPATIENT
Start: 2019-04-14 | End: 2019-07-24

## 2019-04-15 NOTE — PROGRESS NOTES
psl let patient know all his labs came back normal except for low vit D-I will send rx to his pharmacy

## 2019-06-17 DIAGNOSIS — F17.200 SMOKING: Primary | ICD-10-CM

## 2019-06-17 RX ORDER — VARENICLINE TARTRATE 25 MG
KIT ORAL
Qty: 1 DOSE PACK | Refills: 0 | Status: SHIPPED | OUTPATIENT
Start: 2019-06-17 | End: 2019-07-24

## 2019-07-24 ENCOUNTER — OFFICE VISIT (OUTPATIENT)
Dept: FAMILY MEDICINE CLINIC | Age: 69
End: 2019-07-24

## 2019-07-24 VITALS
HEIGHT: 66 IN | TEMPERATURE: 98.4 F | OXYGEN SATURATION: 96 % | DIASTOLIC BLOOD PRESSURE: 85 MMHG | SYSTOLIC BLOOD PRESSURE: 140 MMHG | HEART RATE: 64 BPM | RESPIRATION RATE: 20 BRPM | BODY MASS INDEX: 27.48 KG/M2 | WEIGHT: 171 LBS

## 2019-07-24 DIAGNOSIS — E05.90 SUBCLINICAL HYPERTHYROIDISM: ICD-10-CM

## 2019-07-24 DIAGNOSIS — E78.5 HYPERLIPIDEMIA, UNSPECIFIED HYPERLIPIDEMIA TYPE: ICD-10-CM

## 2019-07-24 DIAGNOSIS — F17.200 SMOKING: ICD-10-CM

## 2019-07-24 DIAGNOSIS — E66.3 OVERWEIGHT (BMI 25.0-29.9): ICD-10-CM

## 2019-07-24 DIAGNOSIS — E55.9 VITAMIN D DEFICIENCY: ICD-10-CM

## 2019-07-24 DIAGNOSIS — I10 ESSENTIAL HYPERTENSION WITH GOAL BLOOD PRESSURE LESS THAN 130/85: ICD-10-CM

## 2019-07-24 DIAGNOSIS — E11.65 TYPE 2 DIABETES MELLITUS WITH HYPERGLYCEMIA, WITHOUT LONG-TERM CURRENT USE OF INSULIN (HCC): Primary | ICD-10-CM

## 2019-07-24 LAB — HBA1C MFR BLD HPLC: 8.1 %

## 2019-07-24 RX ORDER — VARENICLINE TARTRATE 25 MG
KIT ORAL
Qty: 1 DOSE PACK | Refills: 0 | Status: SHIPPED | OUTPATIENT
Start: 2019-07-24 | End: 2019-10-16

## 2019-07-24 RX ORDER — VARENICLINE TARTRATE 25 MG
KIT ORAL
Qty: 1 DOSE PACK | Refills: 0 | Status: SHIPPED | OUTPATIENT
Start: 2019-07-24 | End: 2019-07-24 | Stop reason: SDUPTHER

## 2019-07-24 NOTE — PATIENT INSTRUCTIONS
Stopping Smoking: Care Instructions  Your Care Instructions  Cigarette smokers crave the nicotine in cigarettes. Giving it up is much harder than simply changing a habit. Your body has to stop craving the nicotine. It is hard to quit, but you can do it. There are many tools that people use to quit smoking. You may find that combining tools works best for you. There are several steps to quitting. First you get ready to quit. Then you get support to help you. After that, you learn new skills and behaviors to become a nonsmoker. For many people, a necessary step is getting and using medicine. Your doctor will help you set up the plan that best meets your needs. You may want to attend a smoking cessation program to help you quit smoking. When you choose a program, look for one that has proven success. Ask your doctor for ideas. You will greatly increase your chances of success if you take medicine as well as get counseling or join a cessation program.  Some of the changes you feel when you first quit tobacco are uncomfortable. Your body will miss the nicotine at first, and you may feel short-tempered and grumpy. You may have trouble sleeping or concentrating. Medicine can help you deal with these symptoms. You may struggle with changing your smoking habits and rituals. The last step is the tricky one: Be prepared for the smoking urge to continue for a time. This is a lot to deal with, but keep at it. You will feel better. Follow-up care is a key part of your treatment and safety. Be sure to make and go to all appointments, and call your doctor if you are having problems. It's also a good idea to know your test results and keep a list of the medicines you take. How can you care for yourself at home? · Ask your family, friends, and coworkers for support. You have a better chance of quitting if you have help and support.   · Join a support group, such as Nicotine Anonymous, for people who are trying to quit smoking. · Consider signing up for a smoking cessation program, such as the American Lung Association's Freedom from Smoking program.  · Get text messaging support. Go to the website at www.smokefree. gov to sign up for the [facility] program.  · Set a quit date. Pick your date carefully so that it is not right in the middle of a big deadline or stressful time. Once you quit, do not even take a puff. Get rid of all ashtrays and lighters after your last cigarette. Clean your house and your clothes so that they do not smell of smoke. · Learn how to be a nonsmoker. Think about ways you can avoid those things that make you reach for a cigarette. ? Avoid situations that put you at greatest risk for smoking. For some people, it is hard to have a drink with friends without smoking. For others, they might skip a coffee break with coworkers who smoke. ? Change your daily routine. Take a different route to work or eat a meal in a different place. · Cut down on stress. Calm yourself or release tension by doing an activity you enjoy, such as reading a book, taking a hot bath, or gardening. · Talk to your doctor or pharmacist about nicotine replacement therapy, which replaces the nicotine in your body. You still get nicotine but you do not use tobacco. Nicotine replacement products help you slowly reduce the amount of nicotine you need. These products come in several forms, many of them available over-the-counter:  ? Nicotine patches  ? Nicotine gum and lozenges  ? Nicotine inhaler  · Ask your doctor about bupropion (Wellbutrin) or varenicline (Chantix), which are prescription medicines. They do not contain nicotine. They help you by reducing withdrawal symptoms, such as stress and anxiety. · Some people find hypnosis, acupuncture, and massage helpful for ending the smoking habit. · Eat a healthy diet and get regular exercise. Having healthy habits will help your body move past its craving for nicotine.   · Be prepared to keep trying. Most people are not successful the first few times they try to quit. Do not get mad at yourself if you smoke again. Make a list of things you learned and think about when you want to try again, such as next week, next month, or next year. Where can you learn more? Go to http://dino-ajith.info/. Enter Z380 in the search box to learn more about \"Stopping Smoking: Care Instructions. \"  Current as of: September 26, 2018  Content Version: 12.1  © 1931-9244 Healthwise, [employer]. Care instructions adapted under license by [employer] (which disclaims liability or warranty for this information). If you have questions about a medical condition or this instruction, always ask your healthcare professional. [address] any warranty or liability for your use of this information.

## 2019-07-24 NOTE — PROGRESS NOTES
Chief Complaint   Patient presents with    Follow Up Chronic Condition       Pt is a 76y.o. year old male who presents for follow up of his chronic medical problems    BP Readings from Last 3 Encounters:   07/24/19 140/85   04/10/19 140/80   11/27/18 140/72   Repeat BP-better    Lab Results   Component Value Date/Time    Hemoglobin A1c 6.5 (H) 08/20/2018 11:44 AM    Hemoglobin A1c (POC) 7.9 11/27/2018 10:32 AM   Denies polyuria, polydipsia and polyphagia    Lab Results   Component Value Date/Time    Cholesterol, total 178 04/10/2019 02:48 AM    HDL Cholesterol 34 (L) 04/10/2019 02:48 AM    LDL, calculated 99 04/10/2019 02:48 AM    VLDL, calculated 45 (H) 04/10/2019 02:48 AM    Triglyceride 224 (H) 04/10/2019 02:48 AM    CHOL/HDL Ratio 4.1 07/20/2010 09:50 AM   not on statin    Smoking?yes; mail order did not send Chantix    Lab Results   Component Value Date/Time    TSH 1.360 04/10/2019 02:48 AM       Lab Results   Component Value Date/Time    VITAMIN D, 25-HYDROXY 23.2 (L) 04/10/2019 02:48 AM     S/p rx           ROS:    Pt denies: Wt loss, Fever/Chills, HA, Visual changes, Fatigue, Chest pain, SOB, SALMON, Abd pain, N/V/D/C, Blood in stool or urine, Edema. Pertinent positive as above in HPI. All others were negative    Patient Active Problem List   Diagnosis Code    Smoking F17.200    Essential hypertension I10    Subclinical hyperthyroidism E05.90    Type 2 diabetes mellitus with hyperglycemia, without long-term current use of insulin (HCC) E11.65    Primary osteoarthritis of left knee M17.12    Overweight (BMI 25.0-29. 9) E66.3    Advanced directives, counseling/discussion Z71.89       Past Medical History:   Diagnosis Date    Hypertension        Current Outpatient Medications   Medication Sig Dispense Refill    varenicline (CHANTIX STARTER RIVERA) 0.5 mg (11)- 1 mg (42) DsPk As directed in the pack 1 Dose Pack 0    bisoprolol-hydroCHLOROthiazide (ZIAC) 10-6.25 mg per tablet TAKE 2 TABLETS BY MOUTH  DAILY 180 Tab 2    amLODIPine (NORVASC) 5 mg tablet Take 1 Tab by mouth daily. 90 Tab 2    metFORMIN (GLUCOPHAGE) 500 mg tablet Take 1 Tab by mouth daily (with dinner). 90 Tab 2    aspirin 81 mg chewable tablet Take 81 mg by mouth daily. Social History     Tobacco Use   Smoking Status Light Tobacco Smoker   Smokeless Tobacco Never Used       No Known Allergies    Patient Labs were reviewed: yes      Patient Past Records were reviewed:  yes        Objective:     Vitals:    07/24/19 1226 07/24/19 1301   BP: (!) 156/93 140/85   Pulse: 73 64   Resp: 20    Temp: 98.4 °F (36.9 °C)    TempSrc: Oral    SpO2: 96%    Weight: 171 lb (77.6 kg)    Height: 5' 6\" (1.676 m)      Body mass index is 27.6 kg/m². Exam:   Appearance: alert, well appearing,  oriented to person, place, and time, acyanotic, in no respiratory distress and well hydrated. HEENT:  NC/AT, pink conj, anicteric sclerae  Neck:  No cervical lymphadenopathy, no JVD, no thyromegaly, no carotid bruit  Heart:  RRR without M/R/G  Lungs:  CTAB, no rhonchi, rales, or wheezes with good air exchange   Abdomen:  Non-tender, pos bowel sounds, no hepatosplenomegaly  Ext:  No C/C/E    Skin: no rash  Neuro: no lateralizing signs, CNs II-XII intact         Assessment/ Plan:   Diagnoses and all orders for this visit:    1. Type 2 diabetes mellitus with hyperglycemia, without long-term current use of insulin (HCC) -uncontrolled with A1c at over 8% today,advised to increase Metformin to BID with meals  -     AMB POC HEMOGLOBIN A1C    2. Essential hypertension-controlled, continue with present meds    3. Subclinical hyperthyroidism-recent TSh has been normal, recheck next visit    4. Hyperlipidemia, unspecified hyperlipidemia type-not on statin but now that he is diabetic, will discuss with him further the need to be on this med    5. Vitamin D deficiency-s/p rx, check level with next labs    6.  Smoking-smoking cessation strongly encouraged  -     varenicline (CHANTIX STARTER RIVERA) 0.5 mg (11)- 1 mg (42) DsPk; As directed in the pack    7. Overweight (BMI 25.0-29. 9)-discussed limiting tyree to 1736-6446/day and exercising at least 150 min a week        Follow-up and Dispositions    · Return in about 3 months (around 10/24/2019) for follow up. I have discussed the diagnosis with the patient and the intended plan as seen in the above orders. The patient has received an After-Visit Summary and questions were answered concerning future plans. Medication Side Effects and Warnings were discussed with patient: yes    Patient verbalized understanding of above instructions.     Jorge Mehta MD  Internal Medicine  [facility]

## 2019-07-24 NOTE — PROGRESS NOTES
1. Have you been to the ER, urgent care clinic or hospitalized since your last visit? NO.     2. Have you seen or consulted any other health care providers outside of the [address] [place] [address] since your last visit (Include any pap smears or colon screening)? NO      Do you have an Advanced Directive? NO    Would you like information on Advanced Directives?  NO    Health Maintenance Due   Topic Date Due    Shingrix Vaccine Age 50> (1 of 2) 09/21/2000    HEMOGLOBIN A1C Q6M  05/27/2019    COLONOSCOPY  12/26/2019

## 2019-07-28 DIAGNOSIS — E11.65 TYPE 2 DIABETES MELLITUS WITH HYPERGLYCEMIA, WITHOUT LONG-TERM CURRENT USE OF INSULIN (HCC): Primary | ICD-10-CM

## 2019-07-28 RX ORDER — METFORMIN HYDROCHLORIDE 500 MG/1
500 TABLET ORAL 2 TIMES DAILY WITH MEALS
Qty: 180 TAB | Refills: 1 | Status: SHIPPED | OUTPATIENT
Start: 2019-07-28 | End: 2019-10-22 | Stop reason: DRUGHIGH

## 2019-10-16 ENCOUNTER — OFFICE VISIT (OUTPATIENT)
Dept: FAMILY MEDICINE CLINIC | Age: 69
End: 2019-10-16

## 2019-10-16 VITALS
OXYGEN SATURATION: 95 % | RESPIRATION RATE: 15 BRPM | HEIGHT: 66 IN | DIASTOLIC BLOOD PRESSURE: 80 MMHG | SYSTOLIC BLOOD PRESSURE: 136 MMHG | TEMPERATURE: 97.3 F | HEART RATE: 61 BPM | WEIGHT: 169 LBS | BODY MASS INDEX: 27.16 KG/M2

## 2019-10-16 DIAGNOSIS — E05.90 SUBCLINICAL HYPERTHYROIDISM: ICD-10-CM

## 2019-10-16 DIAGNOSIS — D12.6 TUBULAR ADENOMA OF COLON: ICD-10-CM

## 2019-10-16 DIAGNOSIS — I10 ESSENTIAL HYPERTENSION WITH GOAL BLOOD PRESSURE LESS THAN 130/85: Primary | ICD-10-CM

## 2019-10-16 DIAGNOSIS — E11.65 TYPE 2 DIABETES MELLITUS WITH HYPERGLYCEMIA, WITHOUT LONG-TERM CURRENT USE OF INSULIN (HCC): ICD-10-CM

## 2019-10-16 DIAGNOSIS — E55.9 VITAMIN D DEFICIENCY: ICD-10-CM

## 2019-10-16 DIAGNOSIS — E66.3 OVERWEIGHT (BMI 25.0-29.9): ICD-10-CM

## 2019-10-16 DIAGNOSIS — Z23 ENCOUNTER FOR IMMUNIZATION: ICD-10-CM

## 2019-10-16 DIAGNOSIS — E78.5 HYPERLIPIDEMIA, UNSPECIFIED HYPERLIPIDEMIA TYPE: ICD-10-CM

## 2019-10-16 DIAGNOSIS — F17.200 SMOKING: ICD-10-CM

## 2019-10-16 RX ORDER — VARENICLINE TARTRATE 25 MG
KIT ORAL
Qty: 1 DOSE PACK | Refills: 0 | Status: SHIPPED | OUTPATIENT
Start: 2019-10-16 | End: 2020-01-21

## 2019-10-16 NOTE — PROGRESS NOTES
Chief Complaint   Patient presents with    Follow Up Chronic Condition     3 month; patient not fasting    Immunization/Injection     Influenza vaccine     1. Have you been to the ER, urgent care clinic since your last visit? Hospitalized since your last visit? No    2. Have you seen or consulted any other health care providers outside of the [address] [place] [address] since your last visit? Include any pap smears or colon screening. Leeanne Ward is a 71 y.o. male who presents for routine immunizations. He denies any symptoms , reactions or allergies that would exclude them from being immunized today. Risks and adverse reactions were discussed and the VIS was given to them. All questions were addressed. He was observed for 20 min post injection. There were no reactions observed.     Vikash Massey LPN

## 2019-10-16 NOTE — PROGRESS NOTES
Chief Complaint   Patient presents with    Follow Up Chronic Condition     3 month; patient not fasting    Immunization/Injection     Influenza vaccine       Pt is a 71y.o. year old male who presents for follow up of his chronic medical problems    BP Readings from Last 3 Encounters:   10/16/19 154/89   07/24/19 140/85   04/10/19 140/80   Repeat BP-better  Also drinks too much coffee all day per wife      Wt Readings from Last 3 Encounters:   10/16/19 169 lb (76.7 kg)   07/24/19 171 lb (77.6 kg)   04/10/19 169 lb (76.7 kg)       Last Point of Care HGB A1C  Hemoglobin A1c (POC)   Date Value Ref Range Status   07/24/2019 8.1 % Final    Denies polyuria, polydipsia and polyphagia    Lab Results   Component Value Date/Time    Cholesterol, total 192 10/16/2019 01:37 AM    HDL Cholesterol 41 10/16/2019 01:37 AM    LDL, calculated 125 (H) 10/16/2019 01:37 AM    VLDL, calculated 26 10/16/2019 01:37 AM    Triglyceride 131 10/16/2019 01:37 AM    CHOL/HDL Ratio 4.1 07/20/2010 09:50 AM       Health Maintenance Due   Topic Date Due    Shingrix Vaccine Age 49> (1 of 2) 09/21/2000    Influenza Age 5 to Adult -today 08/01/2019    COLONOSCOPY  12/26/2019     Still smoking-chantix not covered by insurance, not ready to quit  Has not done low dose CT scan-was not called for appt  Wife will fill rx for Chantix out of pocket      May discontinue ASA      ROS:    Pt denies: Wt loss, Fever/Chills, HA, Visual changes, Fatigue, Chest pain, SOB, SALMON, Abd pain, N/V/D/C, Blood in stool or urine, Edema. Pertinent positive as above in HPI. All others were negative    Patient Active Problem List   Diagnosis Code    Smoking F17.200    Essential hypertension I10    Subclinical hyperthyroidism E05.90    Type 2 diabetes mellitus with hyperglycemia, without long-term current use of insulin (HCC) E11.65    Primary osteoarthritis of left knee M17.12    Overweight (BMI 25.0-29. 9) E66.3    Advanced directives, counseling/discussion Z71.89 Past Medical History:   Diagnosis Date    Hypertension        Current Outpatient Medications   Medication Sig Dispense Refill    metFORMIN (GLUCOPHAGE) 500 mg tablet Take 1 Tab by mouth two (2) times daily (with meals). 180 Tab 1    bisoprolol-hydroCHLOROthiazide (ZIAC) 10-6.25 mg per tablet TAKE 2 TABLETS BY MOUTH  DAILY 180 Tab 2    amLODIPine (NORVASC) 5 mg tablet Take 1 Tab by mouth daily. 90 Tab 2    aspirin 81 mg chewable tablet Take 81 mg by mouth daily.    1 Dose Pack 0       Social History     Tobacco Use   Smoking Status Light Tobacco Smoker   Smokeless Tobacco Never Used       No Known Allergies    Patient Labs were reviewed: yes      Patient Past Records were reviewed:  yes        Objective:     Vitals:    10/16/19 1231 10/16/19 1323   BP: 154/89 136/80   Pulse: 61    Resp: 15    Temp: 97.3 °F (36.3 °C)    SpO2: 95%    Weight: 169 lb (76.7 kg)    Height: 5' 6\" (1.676 m)      Body mass index is 27.28 kg/m². Exam:   Appearance: alert, well appearing,  oriented to person, place, and time, acyanotic, in no respiratory distress and well hydrated. HEENT:  NC/AT, pink conj, anicteric sclerae  Neck:  No cervical lymphadenopathy, no JVD, no thyromegaly, no carotid bruit  Heart:  RRR without M/R/G  Lungs:  CTAB, no rhonchi, rales, or wheezes with good air exchange   Abdomen:  Non-tender, pos bowel sounds, no hepatosplenomegaly  Ext:  No C/C/E    Skin: no rash  Neuro: no lateralizing signs, CNs II-XII intact      Assessment/ Plan:   Diagnoses and all orders for this visit:    1. Essential hypertension with goal blood pressure less than 130/85  -     CBC WITH AUTOMATED DIFF; Future  -     METABOLIC PANEL, COMPREHENSIVE; Future    2. Type 2 diabetes mellitus with hyperglycemia, without long-term current use of insulin (HCC)  -     HEMOGLOBIN A1C W/O EAG; Future    3. Hyperlipidemia, unspecified hyperlipidemia type  -     LIPID PANEL; Future    4.  Vitamin D deficiency  -     VITAMIN D, 25 HYDROXY; Future    5. Subclinical hyperthyroidism  -     TSH 3RD GENERATION; Future  -     T4, FREE; Future  -     T3, FREE; Future    6. Tubular adenoma of colon    7. Smoking  -     varenicline (CHANTIX STARTER RIVERA) 0.5 mg (11)- 1 mg (42) DsPk; As directed in the pack    8. Overweight (BMI 25.0-29.9)    9. Encounter for immunization  -     INFLUENZA VACCINE INACTIVATED (IIV), SUBUNIT, ADJUVANTED, IM  -     ADMIN INFLUENZA VIRUS VAC            Follow-up and Dispositions    · Return in about 3 months (around 1/16/2020) for follow up. I have discussed the diagnosis with the patient and the intended plan as seen in the above orders. The patient has received an After-Visit Summary and questions were answered concerning future plans. Medication Side Effects and Warnings were discussed with patient: yes    Patient verbalized understanding of above instructions.     Mikal Mays MD  Internal Medicine  [facility]

## 2019-10-16 NOTE — PATIENT INSTRUCTIONS

## 2019-10-17 LAB
25(OH)D3+25(OH)D2 SERPL-MCNC: 22.3 NG/ML (ref 30–100)
ALBUMIN SERPL-MCNC: 4.5 G/DL (ref 3.6–4.8)
ALBUMIN/GLOB SERPL: 1.3 {RATIO} (ref 1.2–2.2)
ALP SERPL-CCNC: 78 IU/L (ref 39–117)
ALT SERPL-CCNC: 58 IU/L (ref 0–44)
AST SERPL-CCNC: 41 IU/L (ref 0–40)
BASOPHILS # BLD AUTO: 0 X10E3/UL (ref 0–0.2)
BASOPHILS NFR BLD AUTO: 1 %
BILIRUB SERPL-MCNC: 0.6 MG/DL (ref 0–1.2)
BUN SERPL-MCNC: 14 MG/DL (ref 8–27)
BUN/CREAT SERPL: 14 (ref 10–24)
CALCIUM SERPL-MCNC: 10.1 MG/DL (ref 8.6–10.2)
CHLORIDE SERPL-SCNC: 99 MMOL/L (ref 96–106)
CHOLEST SERPL-MCNC: 192 MG/DL (ref 100–199)
CO2 SERPL-SCNC: 26 MMOL/L (ref 20–29)
CREAT SERPL-MCNC: 0.99 MG/DL (ref 0.76–1.27)
EOSINOPHIL # BLD AUTO: 0.2 X10E3/UL (ref 0–0.4)
EOSINOPHIL NFR BLD AUTO: 2 %
ERYTHROCYTE [DISTWIDTH] IN BLOOD BY AUTOMATED COUNT: 13.4 % (ref 12.3–15.4)
GLOBULIN SER CALC-MCNC: 3.6 G/DL (ref 1.5–4.5)
GLUCOSE SERPL-MCNC: 123 MG/DL (ref 65–99)
HBA1C MFR BLD: 8.5 % (ref 4.8–5.6)
HCT VFR BLD AUTO: 49.7 % (ref 37.5–51)
HDLC SERPL-MCNC: 41 MG/DL
HGB BLD-MCNC: 16.9 G/DL (ref 13–17.7)
IMM GRANULOCYTES # BLD AUTO: 0 X10E3/UL (ref 0–0.1)
IMM GRANULOCYTES NFR BLD AUTO: 0 %
INTERPRETATION, 910389: NORMAL
LDLC SERPL CALC-MCNC: 125 MG/DL (ref 0–99)
LYMPHOCYTES # BLD AUTO: 2.3 X10E3/UL (ref 0.7–3.1)
LYMPHOCYTES NFR BLD AUTO: 27 %
Lab: NORMAL
MCH RBC QN AUTO: 32.3 PG (ref 26.6–33)
MCHC RBC AUTO-ENTMCNC: 34 G/DL (ref 31.5–35.7)
MCV RBC AUTO: 95 FL (ref 79–97)
MONOCYTES # BLD AUTO: 0.5 X10E3/UL (ref 0.1–0.9)
MONOCYTES NFR BLD AUTO: 6 %
NEUTROPHILS # BLD AUTO: 5.4 X10E3/UL (ref 1.4–7)
NEUTROPHILS NFR BLD AUTO: 64 %
PLATELET # BLD AUTO: 184 X10E3/UL (ref 150–450)
POTASSIUM SERPL-SCNC: 5.6 MMOL/L (ref 3.5–5.2)
PROT SERPL-MCNC: 8.1 G/DL (ref 6–8.5)
RBC # BLD AUTO: 5.24 X10E6/UL (ref 4.14–5.8)
SODIUM SERPL-SCNC: 141 MMOL/L (ref 134–144)
T3FREE SERPL-MCNC: 3 PG/ML (ref 2–4.4)
T4 FREE SERPL-MCNC: 1.7 NG/DL (ref 0.82–1.77)
TRIGL SERPL-MCNC: 131 MG/DL (ref 0–149)
TSH SERPL DL<=0.005 MIU/L-ACNC: 0.81 UIU/ML (ref 0.45–4.5)
VLDLC SERPL CALC-MCNC: 26 MG/DL (ref 5–40)
WBC # BLD AUTO: 8.4 X10E3/UL (ref 3.4–10.8)

## 2019-10-22 DIAGNOSIS — E11.65 TYPE 2 DIABETES MELLITUS WITH HYPERGLYCEMIA, WITHOUT LONG-TERM CURRENT USE OF INSULIN (HCC): Primary | ICD-10-CM

## 2019-10-22 RX ORDER — METFORMIN HYDROCHLORIDE 1000 MG/1
1000 TABLET ORAL 2 TIMES DAILY WITH MEALS
Qty: 180 TAB | Refills: 2 | Status: SHIPPED | OUTPATIENT
Start: 2019-10-22 | End: 2020-10-13 | Stop reason: SDUPTHER

## 2019-10-23 NOTE — PROGRESS NOTES
pls let patient know-A1c went up from 6.5 to 8. 5!  He needs to go up on the Metformin dose to 1000 mg BID-I will send new rx to his pharmacy  The rest of the labs look good except for slightly low Vit D

## 2020-01-20 DIAGNOSIS — I10 ESSENTIAL HYPERTENSION WITH GOAL BLOOD PRESSURE LESS THAN 130/85: ICD-10-CM

## 2020-01-21 ENCOUNTER — OFFICE VISIT (OUTPATIENT)
Dept: FAMILY MEDICINE CLINIC | Age: 70
End: 2020-01-21

## 2020-01-21 VITALS
SYSTOLIC BLOOD PRESSURE: 153 MMHG | HEIGHT: 66 IN | OXYGEN SATURATION: 97 % | HEART RATE: 61 BPM | DIASTOLIC BLOOD PRESSURE: 82 MMHG | RESPIRATION RATE: 15 BRPM | TEMPERATURE: 96.8 F | BODY MASS INDEX: 27.48 KG/M2 | WEIGHT: 171 LBS

## 2020-01-21 DIAGNOSIS — E11.65 TYPE 2 DIABETES MELLITUS WITH HYPERGLYCEMIA, WITHOUT LONG-TERM CURRENT USE OF INSULIN (HCC): Primary | ICD-10-CM

## 2020-01-21 DIAGNOSIS — E66.3 OVERWEIGHT (BMI 25.0-29.9): ICD-10-CM

## 2020-01-21 DIAGNOSIS — I10 ESSENTIAL HYPERTENSION WITH GOAL BLOOD PRESSURE LESS THAN 130/85: ICD-10-CM

## 2020-01-21 DIAGNOSIS — E78.5 HYPERLIPIDEMIA, UNSPECIFIED HYPERLIPIDEMIA TYPE: ICD-10-CM

## 2020-01-21 DIAGNOSIS — E55.9 VITAMIN D DEFICIENCY: ICD-10-CM

## 2020-01-21 DIAGNOSIS — L98.8 SKIN LESION OF BREAST: ICD-10-CM

## 2020-01-21 DIAGNOSIS — L91.8 SKIN TAG: ICD-10-CM

## 2020-01-21 DIAGNOSIS — F17.200 SMOKING: ICD-10-CM

## 2020-01-21 LAB — HBA1C MFR BLD HPLC: 7.9 %

## 2020-01-21 RX ORDER — AMLODIPINE BESYLATE 10 MG/1
10 TABLET ORAL DAILY
Qty: 90 TAB | Refills: 1 | Status: SHIPPED | OUTPATIENT
Start: 2020-01-21 | End: 2020-08-16

## 2020-01-21 RX ORDER — VARENICLINE TARTRATE 25 MG
KIT ORAL
Qty: 1 DOSE PACK | Refills: 0 | Status: SHIPPED | OUTPATIENT
Start: 2020-01-21 | End: 2021-01-12

## 2020-01-21 RX ORDER — BISOPROLOL FUMARATE AND HYDROCHLOROTHIAZIDE 10; 6.25 MG/1; MG/1
TABLET ORAL
Qty: 180 TAB | Refills: 2 | Status: SHIPPED | OUTPATIENT
Start: 2020-01-21 | End: 2020-10-13 | Stop reason: SDUPTHER

## 2020-01-21 NOTE — PROGRESS NOTES
Chief Complaint   Patient presents with    Follow Up Chronic Condition     DM 3 month; patient not fasting (coffee)       Pt is a 71y.o. year old male who presents for follow up of his chronic medical problems    Health Maintenance Due   Topic Date Due    Shingrix Vaccine Age 49> (1 of 2)-at his pharmacy 09/21/2000    COLONOSCOPY -done downstairs/will get copy 12/26/2019       Wt Readings from Last 3 Encounters:   01/21/20 171 lb (77.6 kg)   10/16/19 169 lb (76.7 kg)   07/24/19 171 lb (77.6 kg)       BP Readings from Last 3 Encounters:   01/21/20 153/82   10/16/19 136/80   07/24/19 140/85   Repeat BP-same  Took meds? yes  Home BP-also elevated  On Bisoprolol 10/6.25 every day (max dose on this med) and Norvasc 5 mg daily    Last Point of Care HGB A1C  Hemoglobin A1c (POC)   Date Value Ref Range Status   01/21/2020 7.9 % Final    from 8.5    Lab Results   Component Value Date/Time    TSH 0.815 10/16/2019 01:37 AM       Lab Results   Component Value Date/Time    Cholesterol, total 192 10/16/2019 01:37 AM    HDL Cholesterol 41 10/16/2019 01:37 AM    LDL, calculated 125 (H) 10/16/2019 01:37 AM    VLDL, calculated 26 10/16/2019 01:37 AM    Triglyceride 131 10/16/2019 01:37 AM    CHOL/HDL Ratio 4.1 07/20/2010 09:50 AM   not on statin    Still smoking?yes, more since retired  Low dose Ct not yet done    Wart like Growth on the left breast area got larger recently and skin tag on the left scapular area is painful    Knee pain is better    ROS:    Pt denies: Wt loss, Fever/Chills, HA, Visual changes, Fatigue, Chest pain, SOB, SALMON, Abd pain, N/V/D/C, Blood in stool or urine, Edema. Pertinent positive as above in HPI.  All others were negative    Patient Active Problem List   Diagnosis Code    Smoking F17.200    Essential hypertension I10    Subclinical hyperthyroidism E05.90    Type 2 diabetes mellitus with hyperglycemia, without long-term current use of insulin (HCC) E11.65    Primary osteoarthritis of left knee M17.12    Overweight (BMI 25.0-29. 9) E66.3    Advanced directives, counseling/discussion Z71.89       Past Medical History:   Diagnosis Date    Hypertension        Current Outpatient Medications   Medication Sig Dispense Refill    bisoprolol-hydroCHLOROthiazide (ZIAC) 10-6.25 mg per tablet TAKE 2 TABLETS EVERY  Tab 2    amLODIPine (NORVASC) 10 mg tablet Take 1 Tab by mouth daily. 90 Tab 1    varenicline (CHANTIX STARTER RIVERA) 0.5 mg (11)- 1 mg (42) DsPk As directed in the pack 1 Dose Pack 0    metFORMIN (GLUCOPHAGE) 1,000 mg tablet Take 1 Tab by mouth two (2) times daily (with meals). 180 Tab 2       Social History     Tobacco Use   Smoking Status Light Tobacco Smoker   Smokeless Tobacco Never Used       No Known Allergies    Patient Labs were reviewed: yes      Patient Past Records were reviewed:  yes        Objective:     Vitals:    01/21/20 1242   BP: 153/82   Pulse: 61   Resp: 15   Temp: 96.8 °F (36 °C)   TempSrc: Oral   SpO2: 97%   Weight: 171 lb (77.6 kg)   Height: 5' 6\" (1.676 m)     Body mass index is 27.6 kg/m². Exam:   Appearance: alert, well appearing,  oriented to person, place, and time, acyanotic, in no respiratory distress and well hydrated. HEENT:  NC/AT, pink conj, anicteric sclerae  Neck:  No cervical lymphadenopathy, no JVD, no thyromegaly, no carotid bruit  Heart:  RRR without M/R/G  Lungs:  CTAB, no rhonchi, rales, or wheezes with good air exchange   Abdomen:  Non-tender, pos bowel sounds, no hepatosplenomegaly  Ext:  No C/C/E    Skin: no rash, large wart like growth on the left breast area, multiple skin tags on the neck but has a large pedunculated one on the left above the clavicle that is particularly painful he says when it gets caught in his clothes  Neuro: no lateralizing signs, CNs II-XII intact    Last Point of Care HGB A1C  Hemoglobin A1c (POC)   Date Value Ref Range Status   01/21/2020 7.9 % Final      Assessment/ Plan:   Diagnoses and all orders for this visit:    1. Type 2 diabetes mellitus with hyperglycemia, without long-term current use of insulin ([facility])-a1c trending down; apparently only taking 1 Metformin/day instead of the 2 recommended after labs last visit  -     AMB POC HEMOGLOBIN A1C    2. Essential hypertension with goal blood pressure less than 130/85 -still uncontrolled, will inc dose of Norvasc to 10 mg; advised to watch for edema  -     amLODIPine (NORVASC) 10 mg tablet; Take 1 Tab by mouth daily. 3. Smoking-smoking cessation strongly encouraged; Will follow up on low dose CT lung-has been smoking since he was a teenager, at times was smoking 2ppd; still smoking and has gotten worse since he retired from work  -     varenicline ([address]) 0.5 mg (11)- 1 mg (42) DsPk; As directed in the pack    4. Hyperlipidemia, unspecified hyperlipidemia type-LDL not at goal, recheck when fasting next visit    5. Vitamin D deficiency-s/p rx; check level next visit    6. Overweight (BMI 25.0-29. 9)-discussed limiting tyree to 7315-9610/day and exercising at least 150 min a week    7. Skin lesion of breast  -     REFERRAL TO DERMATOLOGY    8. Skin tag-symptomatic with pain  -     REFERRAL TO DERMATOLOGY        Follow-up and Dispositions    · Return in about 3 months (around 4/21/2020) for follow up, annual wellness. I have discussed the diagnosis with the patient and the intended plan as seen in the above orders. The patient has received an After-Visit Summary and questions were answered concerning future plans. Medication Side Effects and Warnings were discussed with patient: yes    Patient verbalized understanding of above instructions.     Quinn Runner, MD  Internal Medicine  [address] [facility]

## 2020-01-21 NOTE — PATIENT INSTRUCTIONS
Stopping Smoking: Care Instructions  Your Care Instructions  Cigarette smokers crave the nicotine in cigarettes. Giving it up is much harder than simply changing a habit. Your body has to stop craving the nicotine. It is hard to quit, but you can do it. There are many tools that people use to quit smoking. You may find that combining tools works best for you. There are several steps to quitting. First you get ready to quit. Then you get support to help you. After that, you learn new skills and behaviors to become a nonsmoker. For many people, a necessary step is getting and using medicine. Your doctor will help you set up the plan that best meets your needs. You may want to attend a smoking cessation program to help you quit smoking. When you choose a program, look for one that has proven success. Ask your doctor for ideas. You will greatly increase your chances of success if you take medicine as well as get counseling or join a cessation program.  Some of the changes you feel when you first quit tobacco are uncomfortable. Your body will miss the nicotine at first, and you may feel short-tempered and grumpy. You may have trouble sleeping or concentrating. Medicine can help you deal with these symptoms. You may struggle with changing your smoking habits and rituals. The last step is the tricky one: Be prepared for the smoking urge to continue for a time. This is a lot to deal with, but keep at it. You will feel better. Follow-up care is a key part of your treatment and safety. Be sure to make and go to all appointments, and call your doctor if you are having problems. It's also a good idea to know your test results and keep a list of the medicines you take. How can you care for yourself at home? · Ask your family, friends, and coworkers for support. You have a better chance of quitting if you have help and support.   · Join a support group, such as Nicotine Anonymous, for people who are trying to quit smoking. · Consider signing up for a smoking cessation program, such as the American Lung Association's Freedom from Smoking program.  · Get text messaging support. Go to the website at www.smokefree. gov to sign up for the [facility] program.  · Set a quit date. Pick your date carefully so that it is not right in the middle of a big deadline or stressful time. Once you quit, do not even take a puff. Get rid of all ashtrays and lighters after your last cigarette. Clean your house and your clothes so that they do not smell of smoke. · Learn how to be a nonsmoker. Think about ways you can avoid those things that make you reach for a cigarette. ? Avoid situations that put you at greatest risk for smoking. For some people, it is hard to have a drink with friends without smoking. For others, they might skip a coffee break with coworkers who smoke. ? Change your daily routine. Take a different route to work or eat a meal in a different place. · Cut down on stress. Calm yourself or release tension by doing an activity you enjoy, such as reading a book, taking a hot bath, or gardening. · Talk to your doctor or pharmacist about nicotine replacement therapy, which replaces the nicotine in your body. You still get nicotine but you do not use tobacco. Nicotine replacement products help you slowly reduce the amount of nicotine you need. These products come in several forms, many of them available over-the-counter:  ? Nicotine patches  ? Nicotine gum and lozenges  ? Nicotine inhaler  · Ask your doctor about bupropion (Wellbutrin) or varenicline (Chantix), which are prescription medicines. They do not contain nicotine. They help you by reducing withdrawal symptoms, such as stress and anxiety. · Some people find hypnosis, acupuncture, and massage helpful for ending the smoking habit. · Eat a healthy diet and get regular exercise. Having healthy habits will help your body move past its craving for nicotine.   · Be prepared to keep trying. Most people are not successful the first few times they try to quit. Do not get mad at yourself if you smoke again. Make a list of things you learned and think about when you want to try again, such as next week, next month, or next year. Where can you learn more? Go to http://dino-ajith.info/. Enter R297 in the search box to learn more about \"Stopping Smoking: Care Instructions. \"  Current as of: September 26, 2018  Content Version: 12.2  © 6699-1155 [employer]. Care instructions adapted under license by [employer] (which disclaims liability or warranty for this information). If you have questions about a medical condition or this instruction, always ask your healthcare professional. [address] any warranty or liability for your use of this information.

## 2020-01-21 NOTE — PROGRESS NOTES
Chief Complaint   Patient presents with    Follow Up Chronic Condition     DM 3 month; patient not fasting (coffee)     1. Have you been to the ER, urgent care clinic since your last visit? Hospitalized since your last visit? No    2. Have you seen or consulted any other health care providers outside of the [address] [place] [address] since your last visit? Include any pap smears or colon screening.  No

## 2020-08-15 DIAGNOSIS — I10 ESSENTIAL HYPERTENSION WITH GOAL BLOOD PRESSURE LESS THAN 130/85: ICD-10-CM

## 2020-08-16 RX ORDER — AMLODIPINE BESYLATE 10 MG/1
TABLET ORAL
Qty: 90 TAB | Refills: 1 | Status: SHIPPED | OUTPATIENT
Start: 2020-08-16 | End: 2020-10-13 | Stop reason: SDUPTHER

## 2020-10-13 ENCOUNTER — VIRTUAL VISIT (OUTPATIENT)
Dept: FAMILY MEDICINE CLINIC | Age: 70
End: 2020-10-13
Payer: MEDICARE

## 2020-10-13 DIAGNOSIS — E78.5 HYPERLIPIDEMIA, UNSPECIFIED HYPERLIPIDEMIA TYPE: ICD-10-CM

## 2020-10-13 DIAGNOSIS — Z12.5 SPECIAL SCREENING FOR MALIGNANT NEOPLASM OF PROSTATE: ICD-10-CM

## 2020-10-13 DIAGNOSIS — Z87.891 PERSONAL HISTORY OF TOBACCO USE, PRESENTING HAZARDS TO HEALTH: ICD-10-CM

## 2020-10-13 DIAGNOSIS — H91.93 BILATERAL HEARING LOSS, UNSPECIFIED HEARING LOSS TYPE: ICD-10-CM

## 2020-10-13 DIAGNOSIS — Z71.89 ADVANCED DIRECTIVES, COUNSELING/DISCUSSION: ICD-10-CM

## 2020-10-13 DIAGNOSIS — E05.90 SUBCLINICAL HYPERTHYROIDISM: ICD-10-CM

## 2020-10-13 DIAGNOSIS — E55.9 VITAMIN D DEFICIENCY: ICD-10-CM

## 2020-10-13 DIAGNOSIS — E11.65 TYPE 2 DIABETES MELLITUS WITH HYPERGLYCEMIA, WITHOUT LONG-TERM CURRENT USE OF INSULIN (HCC): ICD-10-CM

## 2020-10-13 DIAGNOSIS — I10 ESSENTIAL HYPERTENSION: ICD-10-CM

## 2020-10-13 DIAGNOSIS — Z00.00 MEDICARE ANNUAL WELLNESS VISIT, SUBSEQUENT: Primary | ICD-10-CM

## 2020-10-13 DIAGNOSIS — D12.6 TUBULAR ADENOMA OF COLON: ICD-10-CM

## 2020-10-13 DIAGNOSIS — F17.200 SMOKING: ICD-10-CM

## 2020-10-13 PROCEDURE — 99214 OFFICE O/P EST MOD 30 MIN: CPT | Performed by: INTERNAL MEDICINE

## 2020-10-13 PROCEDURE — G0439 PPPS, SUBSEQ VISIT: HCPCS | Performed by: INTERNAL MEDICINE

## 2020-10-13 PROCEDURE — 3051F HG A1C>EQUAL 7.0%<8.0%: CPT | Performed by: INTERNAL MEDICINE

## 2020-10-13 RX ORDER — METFORMIN HYDROCHLORIDE 1000 MG/1
1000 TABLET ORAL 2 TIMES DAILY WITH MEALS
Qty: 180 TAB | Refills: 2 | Status: SHIPPED | OUTPATIENT
Start: 2020-10-13 | End: 2021-09-27

## 2020-10-13 RX ORDER — AMLODIPINE BESYLATE 10 MG/1
TABLET ORAL
Qty: 90 TAB | Refills: 2 | Status: SHIPPED | OUTPATIENT
Start: 2020-10-13 | End: 2021-07-22

## 2020-10-13 RX ORDER — BISOPROLOL FUMARATE AND HYDROCHLOROTHIAZIDE 10; 6.25 MG/1; MG/1
TABLET ORAL
Qty: 180 TAB | Refills: 2 | Status: SHIPPED | OUTPATIENT
Start: 2020-10-13 | End: 2021-09-27

## 2020-10-13 NOTE — PROGRESS NOTES
Napoleon Carr is a 79 y.o. male who was seen by synchronous (real-time) audio-video technology on 10/13/2020 for Annual Wellness Visit        Assessment & Plan:   Diagnoses and all orders for this visit:    1. Medicare annual wellness visit, subsequent-see note below    2. Type 2 diabetes mellitus with hyperglycemia, without long-term current use of insulin (HCC)-was only taking Metformin once a day instead of the BID instructions; will check A1C with this dose in mind  -     metFORMIN (GLUCOPHAGE) 1,000 mg tablet; Take 1 Tab by mouth two (2) times daily (with meals). -     MICROALBUMIN, UR, RAND W/ MICROALB/CREAT RATIO; Future  -     HEMOGLOBIN A1C WITH EAG; Future    3. Essential hypertension-will have nurse visit for BP check; advised to take BP meds prior to coming in and will teach him how to use his BP apparatus  -     amLODIPine (NORVASC) 10 mg tablet; TAKE 1 TABLET EVERY DAY  -     bisoprolol-hydroCHLOROthiazide (ZIAC) 10-6.25 mg per tablet; TAKE 2 TABLETS EVERY DAY  -     CBC WITH AUTOMATED DIFF; Future  -     METABOLIC PANEL, COMPREHENSIVE; Future    4. Hyperlipidemia, unspecified hyperlipidemia type-currently not on statin; goal LDL is less than 70 and with DM it is imperative he gets on a statin-will discuss after I get results of this lipid panel  -     LIPID PANEL; Future  Lab Results   Component Value Date/Time    Cholesterol, total 192 10/16/2019 01:37 AM    HDL Cholesterol 41 10/16/2019 01:37 AM    LDL, calculated 125 (H) 10/16/2019 01:37 AM    VLDL, calculated 26 10/16/2019 01:37 AM    Triglyceride 131 10/16/2019 01:37 AM    CHOL/HDL Ratio 4.1 07/20/2010 09:50 AM     5. Vitamin D deficiency-s/p rx; check level    6. Subclinical hyperthyroidism-most recent TSH was normal  -     TSH 3RD GENERATION; Future  -     T4, FREE; Future  -     T3, FREE; Future  Lab Results   Component Value Date/Time    TSH 0.815 10/16/2019 01:37 AM     7. Smoking-advised to start taking Chantix    8.  Bilateral hearing loss, unspecified hearing loss type-exposed to loud sounds at work  -     REFERRAL TO ENT-OTOLARYNGOLOGY    9. Tubular adenoma on colonoscopy done 2012-will see if another one was done after that        Follow-up and Dispositions    · Return in about 3 months (around 1/13/2021) for follow up, fasting labs shortly. 712  Subjective:     Last Point of Care HGB A1C  Hemoglobin A1c (POC)   Date Value Ref Range Status   01/21/2020 7.9 % Final    Metformin inc to BID since-only taking it once a day    BP Readings from Last 3 Encounters:   01/21/20 153/82   10/16/19 136/80   07/24/19 140/85   home BP  Will do nurse visit    Lab Results   Component Value Date/Time    Cholesterol, total 192 10/16/2019 01:37 AM    HDL Cholesterol 41 10/16/2019 01:37 AM    LDL, calculated 125 (H) 10/16/2019 01:37 AM    VLDL, calculated 26 10/16/2019 01:37 AM    Triglyceride 131 10/16/2019 01:37 AM    CHOL/HDL Ratio 4.1 07/20/2010 09:50 AM   not on statin    Still smoking? yes  Chantix rxd but not taking per wife    Prior to Admission medications    Medication Sig Start Date End Date Taking? Authorizing Provider   amLODIPine (NORVASC) 10 mg tablet TAKE 1 TABLET EVERY DAY 10/13/20  Yes Monserrat Haddad MD   bisoprolol-hydroCHLOROthiazide (ZIAC) 10-6.25 mg per tablet TAKE 2 TABLETS EVERY DAY 10/13/20  Yes Shannan Wilcox MD   metFORMIN (GLUCOPHAGE) 1,000 mg tablet Take 1 Tab by mouth two (2) times daily (with meals). 10/13/20  Yes Shannan Wilcox MD   amLODIPine (NORVASC) 10 mg tablet TAKE 1 TABLET EVERY DAY 8/16/20 10/13/20  Monserrat Haddad MD   varenicline (CHANTIX STARTER RIVERA) 0.5 mg (11)- 1 mg (42) DsPk As directed in the pack 1/21/20   Monserrat Haddad MD   bisoprolol-hydroCHLOROthiazide [facility]) 10-6.25 mg per tablet TAKE 2 TABLETS EVERY DAY 1/21/20 10/13/20  Monserrat Haddad MD   metFORMIN (GLUCOPHAGE) 1,000 mg tablet Take 1 Tab by mouth two (2) times daily (with meals).   Patient taking differently: Take 1,000 mg by mouth daily. 10/22/19 10/13/20  Leigha Peck MD     Patient Active Problem List    Diagnosis Date Noted    Advanced directives, counseling/discussion 05/21/2018    Overweight (BMI 25.0-29.9) 02/20/2018    Type 2 diabetes mellitus with hyperglycemia, without long-term current use of insulin ([facility] 10/18/2016    Primary osteoarthritis of left knee 10/18/2016    Subclinical hyperthyroidism 07/18/2016    Essential hypertension 10/15/2015    Smoking 12/02/2013       ROS  Pt denies: Wt loss, Fever/Chills, HA, Visual changes, Fatigue, Chest pain, SOB, SALMON, Abd pain, N/V/D/C, Blood in stool or urine, Edema. Pertinent positive as above in HPI. All others were negative  Objective:   No flowsheet data found. General: alert, cooperative, no distress   Mental  status: normal mood, behavior, speech, dress, motor activity, and thought processes, able to follow commands   HENT: NCAT   Neck: no visualized mass   Resp: no respiratory distress   Neuro: no gross deficits   Skin: no discoloration or lesions of concern on visible areas   Psychiatric: normal affect, consistent with stated mood, no evidence of hallucinations     Additional exam findings: We discussed the expected course, resolution and complications of the diagnosis(es) in detail. Medication risks, benefits, costs, interactions, and alternatives were discussed as indicated. I advised him to contact the office if his condition worsens, changes or fails to improve as anticipated. He expressed understanding with the diagnosis(es) and plan. Blanquita Tran, who was evaluated through a patient-initiated, synchronous (real-time) audio-video encounter, and/or his healthcare decision maker, is aware that it is a billable service, with coverage as determined by his insurance carrier. He provided verbal consent to proceed: Yes, and patient identification was verified.  It was conducted pursuant to the emergency declaration under the [employer] and the [facility], [address] [place] [address] authority and the [employer] and [employer] General Act. A caregiver was present when appropriate. Ability to conduct physical exam was limited. I was at home. The patient was at home. Mahesh Wyatt MD    This is the Subsequent Medicare Annual Wellness Exam, performed 12 months or more after the Initial AWV or the last Subsequent AWV    I have reviewed the patient's medical history in detail and updated the computerized patient record. Will do FIT test? from [facility] instead of colonoscopy  Had tubular adenoma on colonoscopy done in 2012    Never went to do his low dose CT lung that has been scheduled  History     Patient Active Problem List   Diagnosis Code    Smoking F17.200    Essential hypertension I10    Subclinical hyperthyroidism E05.90    Type 2 diabetes mellitus with hyperglycemia, without long-term current use of insulin (HCC) E11.65    Primary osteoarthritis of left knee M17.12    Overweight (BMI 25.0-29. 9) E66.3    Advanced directives, counseling/discussion Z71.89     Past Medical History:   Diagnosis Date    Hypertension       History reviewed. No pertinent surgical history. Current Outpatient Medications   Medication Sig Dispense Refill    amLODIPine (NORVASC) 10 mg tablet TAKE 1 TABLET EVERY DAY 90 Tab 2    bisoprolol-hydroCHLOROthiazide (ZIAC) 10-6.25 mg per tablet TAKE 2 TABLETS EVERY  Tab 2    metFORMIN (GLUCOPHAGE) 1,000 mg tablet Take 1 Tab by mouth two (2) times daily (with meals).  180 Tab 2    varenicline (CHANTIX STARTER RIVERA) 0.5 mg (11)- 1 mg (43) DsPk As directed in the pack 1 Dose Pack 0     No Known Allergies    Family History   Problem Relation Age of Onset    Hypertension Mother     Stroke Mother     Hypertension Father     Stroke Father     Diabetes Brother      Social History     Tobacco Use    Smoking status: Light Tobacco Smoker    Smokeless tobacco: Never Used Substance Use Topics    Alcohol use: Yes     Comment: social       Depression Risk Factor Screening:     3 most recent PHQ Screens 10/13/2020   Little interest or pleasure in doing things Not at all   Feeling down, depressed, irritable, or hopeless Not at all   Total Score PHQ 2 0       Alcohol Risk Screen   Do you average more than 1 drink per night or more than 7 drinks a week: No    In the past three months have you have had more than 4 drinks containing alcohol on one occasion: No    Occasional beer      Functional Ability and Level of Safety:   Hearing: The patient needs further evaluation. ok for ENT referral    Activities of Daily Living: The home contains: no safety equipment. Patient does total self care     Ambulation: with no difficulty     Fall Risk:  Fall Risk Assessment, last 12 mths 10/13/2020   Able to walk? Yes   Fall in past 12 months? No     Abuse Screen:  Patient is not abused       Cognitive Screening   Has your family/caregiver stated any concerns about your memory: no    Cognitive Screening: not needed    Patient Care Team   Patient Care Team:  Taz Montanez MD as PCP - General (Internal Medicine)  Taz Montanez MD as PCP - [facility] Empaneled Provider    Assessment/Plan   Education and counseling provided:  Are appropriate based on today's review and evaluation  End-of-Life planning (with patient's consent)-see ACP note  Pneumococcal Vaccine-done  Influenza Vaccine-shortly  Prostate cancer screening tests (PSA, covered annually)-ordered  No results found for: Oscar Nico, PSAR3, [id number], [id number], 70314, PSAEXT  Colorectal cancer screening tests-discuss need for follow up colonoscopy bec of tubular adenoma  Cardiovascular screening blood test-fasting lipids ordered today  Screening for glaucoma-eye exam is up to date    Diagnoses and all orders for this visit:    1.  Medicare annual wellness visit, subsequent-Refer to above for plan and to patient instructions for recommendations on HM  Will re-order low dose CT lung because he continues to smoke    2. Advanced directives, counseling/discussion    3. Special screening for malignant neoplasm of prostate  -     PSA SCREENING (SCREENING); Future        Health Maintenance Due   Topic Date Due    Shingrix Vaccine Age 49> (1 of 2)-will get this at his pharmacy 09/21/2000    Colonoscopy -will do FIT test 12/26/2019    Medicare Yearly Exam -today 04/10/2020    MICROALBUMIN Q1 -ordered 04/10/2020    Flu Vaccine (1)-at his pharmacy 09/01/2020    Lipid Screen -ordered 10/16/2020     RTC yearly for wellness visit      Ariana Uriostegui, who was evaluated through a synchronous (real-time) audio-video encounter, and/or his healthcare decision maker, is aware that it is a billable service, with coverage as determined by his insurance carrier. He provided verbal consent to proceed: Yes, and patient identification was verified. It was conducted pursuant to the emergency declaration under the [address] [place] [address] authority and the [employer] and [employer] General Act. A caregiver was present when appropriate. Ability to conduct physical exam was limited. I was at home. The patient was at home.     Carli Burns MD

## 2020-10-13 NOTE — ACP (ADVANCE CARE PLANNING)
Advance Care Planning       Advance Care Planning (ACP) Physician/NP/PA (Provider) Conversation        Date of ACP Conversation: 10/13/2020    Conversation Conducted with:   Patient with [address] Maker:    Current Designated Health Care Decision Maker:   Primary Decision Maker (Active): [place] patricio - Spouse - [phone]    Secondary Decision Maker: Mike Ricci - Son - [phone]  (If there is a [address] named in the \"Healthcare Decision Makers\" box in the ACP activity, but it is not visible above, be sure to open that field and then select the health care decision maker relationship (ie \"primary\") in the blank space to the right of the name.)            Care Preferences:    Hospitalization: \"If your health worsens and it becomes clear that your chance of recovery is unlikely, what would your preference be regarding hospitalization? \"  If the patient would want hospitalization, answer \"yes\". If the patient would prefer comfort-focused treatment without hospitalization, answer \"no\". yes      Ventilation: \"If you were in your present state of health and suddenly became very ill and were unable to breathe on your own, what would your preference be about the use of a ventilator (breathing machine) if it was available to you? \"    If patient would desire the use of a ventilator (breathing machine), answer \"yes\", if not answer \"no\":yes    \"If your health worsens and it becomes clear that your chance of recovery is unlikely, what would your preference be about the use of a ventilator (breathing machine) if it was available to you? \"   yes      Resuscitation:  \"CPR works best to restart the heart when there is a sudden event, like a heart attack, in someone who is otherwise healthy. Unfortunately, CPR does not typically restart the heart for people who have serious health conditions or who are very sick. \"    \"In the event your heart stopped as a result of an underlying serious health condition, would you want attempts to be made to restart your heart (answer \"yes\" for attempt to resuscitate) or would you prefer a natural death (answer \"no\" for do not attempt to resuscitate)? \"   yes    NOTE: If the patient has a valid advance directive AND provides care preference(s) that are inconsistent with that prior directive, advise the patient to consider either: creating a new advance directive that complies with state-specific requirements; or, if that is not possible, orally revoking that prior directive in accordance with state-specific requirements, which must be documented in the EHR.     Conversation Outcomes / Follow-Up Plan:   Recommended completion of Advance Directive      Length of Voluntary ACP Conversation in minutes:  <16 minutes (Non-Billable)      Coco Delcid MD

## 2020-10-13 NOTE — PATIENT INSTRUCTIONS
Medicare Wellness Visit, Male The best way to live healthy is to have a lifestyle where you eat a well-balanced diet, exercise regularly, limit alcohol use, and quit all forms of tobacco/nicotine, if applicable. Regular preventive services are another way to keep healthy. Preventive services (vaccines, screening tests, monitoring & exams) can help personalize your care plan, which helps you manage your own care. Screening tests can find health problems at the earliest stages, when they are easiest to treat. [facility] follows the current, evidence-based guidelines published by the [facility] ([facility] [[facility]]STF) when recommending preventive services for our patients. Because we follow these guidelines, sometimes recommendations change over time as research supports it. (For example, a prostate screening blood test is no longer routinely recommended for men with no symptoms). Of course, you and your doctor may decide to screen more often for some diseases, based on your risk and co-morbidities (chronic disease you are already diagnosed with). Preventive services for you include: - Medicare offers their members a free annual wellness visit, which is time for you and your primary care provider to discuss and plan for your preventive service needs. Take advantage of this benefit every year! 
-All adults over age 72 should receive the recommended pneumonia vaccines. Current USPSTF guidelines recommend a series of two vaccines for the best pneumonia protection.  
-All adults should have a flu vaccine yearly and tetanus vaccine every 10 years. 
-All adults age 48 and older should receive the shingles vaccines (series of two vaccines).       
-All adults age 38-68 who are overweight should have a diabetes screening test once every three years.  
-Other screening tests & preventive services for persons with diabetes include: an eye exam to screen for diabetic retinopathy, a kidney function test, a foot exam, and stricter control over your cholesterol.  
-Cardiovascular screening for adults with routine risk involves an electrocardiogram (ECG) at intervals determined by the provider.  
-Colorectal cancer screening should be done for adults age 54-65 with no increased risk factors for colorectal cancer. There are a number of acceptable methods of screening for this type of cancer. Each test has its own benefits and drawbacks. Discuss with your provider what is most appropriate for you during your annual wellness visit. The different tests include: colonoscopy (considered the best screening method), a fecal occult blood test, a fecal DNA test, and sigmoidoscopy. 
-All adults born between [facility] should be screened once for Hepatitis C. 
-An Abdominal Aortic Aneurysm (AAA) Screening is recommended for men age 73-68 who has ever smoked in their lifetime. Here is a list of your current Health Maintenance items (your personalized list of preventive services) with a due date: 
Health Maintenance Due Topic Date Due  Shingles Vaccine (1 of 2) 09/21/2000  Colonoscopy  12/26/2019 Abiola Lopez Annual Well Visit  04/10/2020  Albumin Urine Test  04/10/2020  Yearly Flu Vaccine (1) 09/01/2020  Cholesterol Test   10/16/2020

## 2020-10-13 NOTE — PROGRESS NOTES
Chief Complaint   Patient presents with   [address] [place] [address] Annual Wellness Visit     1. Have you been to the ER, urgent care clinic since your last visit? Hospitalized since your last visit? No    2. Have you seen or consulted any other health care providers outside of the [address] [place] [address] since your last visit? Include any pap smears or colon screening.  No     Health Maintenance Due   Topic    Shingrix Vaccine Age 49> (1 of 2)    Colonoscopy     Medicare Yearly Exam     MICROALBUMIN Q1     Flu Vaccine (1)    Lipid Screen

## 2020-10-14 ENCOUNTER — CLINICAL SUPPORT (OUTPATIENT)
Dept: FAMILY MEDICINE CLINIC | Age: 70
End: 2020-10-14

## 2020-10-14 ENCOUNTER — APPOINTMENT (OUTPATIENT)
Dept: FAMILY MEDICINE CLINIC | Age: 70
End: 2020-10-14

## 2020-10-14 VITALS — DIASTOLIC BLOOD PRESSURE: 79 MMHG | SYSTOLIC BLOOD PRESSURE: 148 MMHG

## 2020-10-14 DIAGNOSIS — I10 ESSENTIAL HYPERTENSION: Primary | ICD-10-CM

## 2020-10-14 NOTE — PROGRESS NOTES
Patient presents for BP check. Patient rested for 5 minutes prior to check. Patient seated with feet flat on the floor. BP readings recorded in chart.

## 2020-10-15 LAB
25(OH)D3+25(OH)D2 SERPL-MCNC: 17 NG/ML (ref 30–100)
ALBUMIN SERPL-MCNC: 4.5 G/DL (ref 3.8–4.8)
ALBUMIN/CREAT UR: 17 MG/G CREAT (ref 0–29)
ALBUMIN/GLOB SERPL: 1.3 {RATIO} (ref 1.2–2.2)
ALP SERPL-CCNC: 90 IU/L (ref 39–117)
ALT SERPL-CCNC: 30 IU/L (ref 0–44)
AST SERPL-CCNC: 23 IU/L (ref 0–40)
BASOPHILS # BLD AUTO: 0.1 X10E3/UL (ref 0–0.2)
BASOPHILS NFR BLD AUTO: 1 %
BILIRUB SERPL-MCNC: 0.5 MG/DL (ref 0–1.2)
BUN SERPL-MCNC: 12 MG/DL (ref 8–27)
BUN/CREAT SERPL: 11 (ref 10–24)
CALCIUM SERPL-MCNC: 10 MG/DL (ref 8.6–10.2)
CHLORIDE SERPL-SCNC: 100 MMOL/L (ref 96–106)
CHOLEST SERPL-MCNC: 199 MG/DL (ref 100–199)
CO2 SERPL-SCNC: 24 MMOL/L (ref 20–29)
CREAT SERPL-MCNC: 1.14 MG/DL (ref 0.76–1.27)
CREAT UR-MCNC: 45.4 MG/DL
EOSINOPHIL # BLD AUTO: 0.2 X10E3/UL (ref 0–0.4)
EOSINOPHIL NFR BLD AUTO: 2 %
ERYTHROCYTE [DISTWIDTH] IN BLOOD BY AUTOMATED COUNT: 13.3 % (ref 11.6–15.4)
EST. AVERAGE GLUCOSE BLD GHB EST-MCNC: 151 MG/DL
GLOBULIN SER CALC-MCNC: 3.4 G/DL (ref 1.5–4.5)
GLUCOSE SERPL-MCNC: 142 MG/DL (ref 65–99)
HBA1C MFR BLD: 6.9 % (ref 4.8–5.6)
HCT VFR BLD AUTO: 45.8 % (ref 37.5–51)
HDLC SERPL-MCNC: 41 MG/DL
HGB BLD-MCNC: 15.8 G/DL (ref 13–17.7)
IMM GRANULOCYTES # BLD AUTO: 0 X10E3/UL (ref 0–0.1)
IMM GRANULOCYTES NFR BLD AUTO: 0 %
INTERPRETATION, 910389: NORMAL
LDLC SERPL CALC-MCNC: 136 MG/DL (ref 0–99)
LYMPHOCYTES # BLD AUTO: 1.8 X10E3/UL (ref 0.7–3.1)
LYMPHOCYTES NFR BLD AUTO: 27 %
Lab: NORMAL
MCH RBC QN AUTO: 31.4 PG (ref 26.6–33)
MCHC RBC AUTO-ENTMCNC: 34.5 G/DL (ref 31.5–35.7)
MCV RBC AUTO: 91 FL (ref 79–97)
MICROALBUMIN UR-MCNC: 7.8 UG/ML
MONOCYTES # BLD AUTO: 0.5 X10E3/UL (ref 0.1–0.9)
MONOCYTES NFR BLD AUTO: 7 %
NEUTROPHILS # BLD AUTO: 4.3 X10E3/UL (ref 1.4–7)
NEUTROPHILS NFR BLD AUTO: 63 %
PLATELET # BLD AUTO: 195 X10E3/UL (ref 150–450)
POTASSIUM SERPL-SCNC: 4.6 MMOL/L (ref 3.5–5.2)
PROT SERPL-MCNC: 7.9 G/DL (ref 6–8.5)
PSA SERPL-MCNC: 1.6 NG/ML (ref 0–4)
RBC # BLD AUTO: 5.03 X10E6/UL (ref 4.14–5.8)
SODIUM SERPL-SCNC: 141 MMOL/L (ref 134–144)
T3FREE SERPL-MCNC: 3 PG/ML (ref 2–4.4)
T4 FREE SERPL-MCNC: 1.58 NG/DL (ref 0.82–1.77)
TRIGL SERPL-MCNC: 124 MG/DL (ref 0–149)
TSH SERPL DL<=0.005 MIU/L-ACNC: 0.95 UIU/ML (ref 0.45–4.5)
VLDLC SERPL CALC-MCNC: 22 MG/DL (ref 5–40)
WBC # BLD AUTO: 6.9 X10E3/UL (ref 3.4–10.8)

## 2020-10-23 DIAGNOSIS — E55.9 VITAMIN D DEFICIENCY: ICD-10-CM

## 2020-10-23 DIAGNOSIS — E11.65 TYPE 2 DIABETES MELLITUS WITH HYPERGLYCEMIA, WITHOUT LONG-TERM CURRENT USE OF INSULIN (HCC): Primary | ICD-10-CM

## 2020-10-23 DIAGNOSIS — E78.5 HYPERLIPIDEMIA, UNSPECIFIED HYPERLIPIDEMIA TYPE: ICD-10-CM

## 2020-10-23 RX ORDER — ERGOCALCIFEROL 1.25 MG/1
50000 CAPSULE ORAL
Qty: 12 CAP | Refills: 1 | Status: SHIPPED | OUTPATIENT
Start: 2020-10-23 | End: 2021-11-02 | Stop reason: SDUPTHER

## 2020-10-23 RX ORDER — ATORVASTATIN CALCIUM 10 MG/1
10 TABLET, FILM COATED ORAL DAILY
Qty: 90 TAB | Refills: 1 | Status: SHIPPED | OUTPATIENT
Start: 2020-10-23 | End: 2021-05-04

## 2020-10-23 NOTE — PROGRESS NOTES
pls let patient know-A1c is improved from 8.5 to 6.9 so continue with same diabetes med  LDL or bad cholesterol is elevated so I will send rx for cholesterol med  Vit D is low so I will also send rx for that  Thyroid and prostate test normal as well as urine test  pls remind him to do the low dose CT lung

## 2020-11-17 ENCOUNTER — TELEPHONE (OUTPATIENT)
Dept: PHARMACY | Age: 70
End: 2020-11-17

## 2020-11-17 NOTE — TELEPHONE ENCOUNTER
Shania - can you confirm with pharmacy and/or patient whether taking atorvastatin 10mg daily? - Appears was new rx 10/23/20    Thank you,  Francisco Brody, PharmD, [facility]  Direct: [phone]  Department, toll free: [phone], option 7      ==============================================================  CLINICAL PHARMACY: STATIN REVIEW    SUBJECTIVE:   Identified as DM care gap for [employer]: statin therapy. OBJECTIVE:  Current Outpatient Medications List Includes   Medication Sig Dispense Refill    atorvastatin (LIPITOR) 10 mg tablet Take 1 Tab by mouth daily. 90 Tab 1     Lab Results   Component Value Date/Time    Cholesterol, total 199 10/14/2020 01:08 AM    HDL Cholesterol 41 10/14/2020 01:08 AM    LDL, calculated 125 (H) 10/16/2019 01:37 AM    LDL Chol Calc (NIH) 136 (H) 10/14/2020 01:08 AM    VLDL, calculated 26 10/16/2019 01:37 AM    VLDL Cholesterol [place] 22 10/14/2020 01:08 AM    Triglyceride 124 10/14/2020 01:08 AM    CHOL/HDL Ratio 4.1 07/20/2010 09:50 AM     ALT (SGPT)   Date Value Ref Range Status   10/14/2020 30 0 - 44 IU/L Final     AST (SGOT)   Date Value Ref Range Status   10/14/2020 23 0 - 40 IU/L Final     BP Readings from Last 1 Encounters:   10/14/20 (!) 148/79       Social History     Tobacco Use    Smoking status: Light Tobacco Smoker    Smokeless tobacco: Never Used   Substance Use Topics    Alcohol use: Yes     Comment: social         ASSESSMENT:  · Appears to be newly prescribed atorvastatin 10mg daily, per patient's insurance report, no prescription claims for statin therapy this year.   · Likely a delay in report/claims - appears new rx for atorvastatin start on 10/23/20    PLAN:  - Confirm with pharmacy and/or patient if filling/taking atorvastatin 10mg daily

## 2020-11-24 NOTE — TELEPHONE ENCOUNTER
CLINICAL PHARMACY: STATIN THERAPY REVIEW    Identified care gap per [facility] statin use in persons with diabetes and adherence     Per [facility]     Medication: Atorvastatin 10mg  Recent  fill dates: 10/23/2020  Day supply: 90  Refills remainin  Directions: 1 tab po qd  Insurance billed: [employer]  Prescribing Provider: Diana Reynoso MD      No patient outreach planned at this time. Patient was sent medication and has refills remaining.      Roberta Tee, [address] [address], [facility]  Department, toll free: [phone], option 7

## 2020-11-25 NOTE — TELEPHONE ENCOUNTER
Noted below, thank you!    =========================================================   For Pharmacy Admin Tracking Only    PHSO: PHSO Patient?: Yes  Total # of Interventions Recommended: Count: 1  - New Order #: 0 New Medication Order Reason(s):  Adherence  - Maintenance Safety Lab Monitoring #: 1  Recommended intervention potential cost savings: 1  Total Interventions Accepted: 1  Time Spent (min): 15

## 2021-01-12 ENCOUNTER — VIRTUAL VISIT (OUTPATIENT)
Dept: FAMILY MEDICINE CLINIC | Age: 71
End: 2021-01-12
Payer: MEDICARE

## 2021-01-12 DIAGNOSIS — F17.200 SMOKING: ICD-10-CM

## 2021-01-12 DIAGNOSIS — D12.6 TUBULAR ADENOMA OF COLON: ICD-10-CM

## 2021-01-12 DIAGNOSIS — E55.9 VITAMIN D DEFICIENCY: ICD-10-CM

## 2021-01-12 DIAGNOSIS — E78.5 HYPERLIPIDEMIA, UNSPECIFIED HYPERLIPIDEMIA TYPE: ICD-10-CM

## 2021-01-12 DIAGNOSIS — H91.93 BILATERAL HEARING LOSS, UNSPECIFIED HEARING LOSS TYPE: ICD-10-CM

## 2021-01-12 DIAGNOSIS — E05.90 SUBCLINICAL HYPERTHYROIDISM: ICD-10-CM

## 2021-01-12 DIAGNOSIS — E11.65 TYPE 2 DIABETES MELLITUS WITH HYPERGLYCEMIA, WITHOUT LONG-TERM CURRENT USE OF INSULIN (HCC): ICD-10-CM

## 2021-01-12 DIAGNOSIS — I10 ESSENTIAL HYPERTENSION: Primary | ICD-10-CM

## 2021-01-12 PROCEDURE — 3017F COLORECTAL CA SCREEN DOC REV: CPT | Performed by: INTERNAL MEDICINE

## 2021-01-12 PROCEDURE — 99214 OFFICE O/P EST MOD 30 MIN: CPT | Performed by: INTERNAL MEDICINE

## 2021-01-12 PROCEDURE — 1101F PT FALLS ASSESS-DOCD LE1/YR: CPT | Performed by: INTERNAL MEDICINE

## 2021-01-12 PROCEDURE — 2022F DILAT RTA XM EVC RTNOPTHY: CPT | Performed by: INTERNAL MEDICINE

## 2021-01-12 PROCEDURE — G8432 DEP SCR NOT DOC, RNG: HCPCS | Performed by: INTERNAL MEDICINE

## 2021-01-12 PROCEDURE — 3046F HEMOGLOBIN A1C LEVEL >9.0%: CPT | Performed by: INTERNAL MEDICINE

## 2021-01-12 PROCEDURE — G8427 DOCREV CUR MEDS BY ELIG CLIN: HCPCS | Performed by: INTERNAL MEDICINE

## 2021-01-12 PROCEDURE — G8756 NO BP MEASURE DOC: HCPCS | Performed by: INTERNAL MEDICINE

## 2021-01-12 NOTE — PROGRESS NOTES
Edd Valente is a 79 y.o. male who was seen by synchronous (real-time) audio-video technology on 1/12/2021 for Follow Up Chronic Condition (HTN, DM)        Assessment & Plan:   Diagnoses and all orders for this visit:    1. Essential hypertension-advised on home monitoring; take BP meds tomorrow prior to office visit and will check BP at the office    2. Type 2 diabetes mellitus with hyperglycemia, without long-term current use of insulin (HCC)    3. Hyperlipidemia, unspecified hyperlipidemia type-Lipitor started Oct 2020, check fasting lipids    4. Vitamin D deficiency-currently on rx    5. Subclinical hyperthyroidism-recent TSH normal    6. Smoking-smoking cessation advised    7. Bilateral hearing loss, unspecified hearing loss type-will follow up on ENT referral    8. Tubular adenoma of colon-will obtain last colonoscopy        Follow-up and Dispositions    · Return in about 3 months (around 4/12/2021) for follow up.    Routing History          712  Subjective:     Health Maintenance Due   Topic Date Due    Shingrix Vaccine Age 49> (1 of 2) 09/21/2000    Colorectal Cancer Screening Combo  12/26/2019    Flu Vaccine (1)-in AM 09/01/2020   last colonoscopy done downstairs per wife  Tubular adenoma in 2012-?colonoscopy after this    BP Readings from Last 3 Encounters:   10/14/20 (!) 148/79   01/21/20 153/82   10/16/19 136/80   home BP-does not check    Lab Results   Component Value Date/Time    Hemoglobin A1c 6.9 (H) 10/14/2020 01:08 AM    Hemoglobin A1c (POC) 7.9 01/21/2020 12:58 PM   Denies polyuria, polydipsia and polyphagia  Tolerating higher dose of Metformin  Neg for microalb    Lab Results   Component Value Date/Time    Cholesterol, total 199 10/14/2020 01:08 AM    HDL Cholesterol 41 10/14/2020 01:08 AM    LDL, calculated 136 (H) 10/14/2020 01:08 AM    LDL, calculated 125 (H) 10/16/2019 01:37 AM    VLDL, calculated 22 10/14/2020 01:08 AM    VLDL, calculated 26 10/16/2019 01:37 AM    Triglyceride 124 10/14/2020 01:08 AM    CHOL/HDL Ratio 4.1 07/20/2010 09:50 AM   On Lipitor since    Still smoking? yes  Chantix-had side effects  Has not yet done CT low dose lung    Lab Results   Component Value Date/Time    VITAMIN D, 25-HYDROXY 17.0 (L) 10/14/2020 01:08 AM     On rx      Lab Results   Component Value Date/Time    TSH 0.950 10/14/2020 01:08 AM   no sxs of hyperthyroidism      Prior to Admission medications    Medication Sig Start Date End Date Taking? Authorizing Provider   ergocalciferol (ERGOCALCIFEROL) 1,250 mcg (50,000 unit) capsule Take 1 Cap by mouth every seven (7) days. 10/23/20  Yes Shannan Wilcox MD   atorvastatin (LIPITOR) 10 mg tablet Take 1 Tab by mouth daily. 10/23/20  Yes Shannan Wilcox MD   amLODIPine (NORVASC) 10 mg tablet TAKE 1 TABLET EVERY DAY 10/13/20  Yes Mary Disla MD   bisoprolol-hydroCHLOROthiazide (ZIAC) 10-6.25 mg per tablet TAKE 2 TABLETS EVERY DAY 10/13/20  Yes Shannan Wilcox MD   metFORMIN (GLUCOPHAGE) 1,000 mg tablet Take 1 Tab by mouth two (2) times daily (with meals). 10/13/20  Yes Mary Disla MD     1/21/20   Mary Disla MD     Patient Active Problem List    Diagnosis Date Noted    Tubular adenoma of colon 10/13/2020    Advanced directives, counseling/discussion 05/21/2018    Overweight (BMI 25.0-29.9) 02/20/2018    Type 2 diabetes mellitus with hyperglycemia, without long-term current use of insulin ([facility] 10/18/2016    Primary osteoarthritis of left knee 10/18/2016    Subclinical hyperthyroidism 07/18/2016    Essential hypertension 10/15/2015    Smoking 12/02/2013       ROS  Pt denies: Wt loss, Fever/Chills, HA, Visual changes, Fatigue, Chest pain, SOB, SALMON, Abd pain, N/V/D/C, Blood in stool or urine, Edema. Pertinent positive as above in HPI. All others were negative  Objective:   No flowsheet data found.    General: alert, cooperative, no distress   Mental  status: normal mood, behavior, speech, dress, motor activity, and thought processes, able to follow commands   HENT: NCAT   Neck: no visualized mass   Resp: no respiratory distress   Neuro: no gross deficits   Skin: no discoloration or lesions of concern on visible areas   Psychiatric: normal affect, consistent with stated mood, no evidence of hallucinations     Additional exam findings: none      We discussed the expected course, resolution and complications of the diagnosis(es) in detail. Medication risks, benefits, costs, interactions, and alternatives were discussed as indicated. I advised him to contact the office if his condition worsens, changes or fails to improve as anticipated. He expressed understanding with the diagnosis(es) and plan. Izaiah Beckford, who was evaluated through a patient-initiated, synchronous (real-time) audio-video encounter, and/or his healthcare decision maker, is aware that it is a billable service, with coverage as determined by his insurance carrier. He provided verbal consent to proceed: Yes, and patient identification was verified. It was conducted pursuant to the emergency declaration under the [address] [place] [address] authority and the [employer] and [employer] General Act. A caregiver was present when appropriate. Ability to conduct physical exam was limited. I was at home. The patient was at home.       Seun Hatfield MD

## 2021-01-12 NOTE — PROGRESS NOTES
Chief Complaint   Patient presents with    Follow Up Chronic Condition     HTN, DM       1. Have you been to the ER, urgent care clinic since your last visit? Hospitalized since your last visit? No    2. Have you seen or consulted any other health care providers outside of the [address] [place] [address] since your last visit? Include any pap smears or colon screening. No     Health Maintenance Due   Topic Date Due    Shingrix Vaccine Age 49> (1 of 2) 09/21/2000    Colorectal Cancer Screening Combo  12/26/2019    Flu Vaccine (1) 09/01/2020     Patient scheduled for flu shot 1/13.

## 2021-01-12 NOTE — PATIENT INSTRUCTIONS
Deciding About Using Medicines To Quit Smoking How can you decide about using medicines to quit smoking? What are the medicines you can use? Your doctor may prescribe varenicline (Chantix) or bupropion (Zyban). These medicines can help you cope with cravings for tobacco. They are pills that don't contain nicotine. You also can use nicotine replacement products. These do contain nicotine. There are many types. · Gum and lozenges slowly release nicotine into your mouth. · Patches stick to your skin. They slowly release nicotine into your bloodstream. 
· An inhaler has a larson that contains nicotine. You breathe in a puff of nicotine vapor through your mouth and throat. · Nasal spray releases a mist that contains nicotine. What are key points about this decision? · Using medicines can double your chances of quitting smoking. They can ease cravings and withdrawal symptoms. · Getting counseling along with using medicine can raise your chances of quitting even more. · If you smoke fewer than 5 cigarettes a day, you may not need medicines to help you quit smoking. · These medicines have less nicotine than cigarettes. And by itself, nicotine is not nearly as harmful as smoking. The tars, carbon monoxide, and other toxic chemicals in tobacco cause the harmful effects. · The side effects of nicotine replacement products depend on the type of product. For example, a patch can make your skin red and itchy. Medicines in pill form can make you sick to your stomach. They can also cause dry mouth and trouble sleeping. For most people, the side effects are not bad enough to make them stop using the products. Why might you choose to use medicines to quit smoking? · You have tried on your own to stop smoking, but you were not able to stop. · You smoke more than 5 cigarettes a day. · You want to increase your chances of quitting smoking. · You want to reduce your cravings and withdrawal symptoms. · You feel the benefits of medicine outweigh the side effects. Why might you choose not to use medicine? · You want to try quitting on your own by stopping all at once (\"cold turkey\"). · You want to cut back slowly on the number of cigarettes you smoke. · You smoke fewer than 5 cigarettes a day. · You do not like using medicine. · You feel the side effects of medicines outweigh the benefits. · You are worried about the cost of medicines. Your decision Thinking about the facts and your feelings can help you make a decision that is right for you. Be sure you understand the benefits and risks of your options, and think about what else you need to do before you make the decision. Where can you learn more? Go to http://www.gray.com/ Enter D380 in the search box to learn more about \"Deciding About Using Medicines To Quit Smoking. \" Current as of: March 12, 2020               Content Version: 12.6 © 6229-0007 [employer]. Care instructions adapted under license by [employer] (which disclaims liability or warranty for this information). If you have questions about a medical condition or this instruction, always ask your healthcare professional. [address] [address] any warranty or liability for your use of this information.

## 2021-01-12 NOTE — Clinical Note
Leodan check BP tomorrow when he gets flu shot  Pls re-fax order for low dose CT at [facility]

## 2021-01-14 ENCOUNTER — APPOINTMENT (OUTPATIENT)
Dept: FAMILY MEDICINE CLINIC | Age: 71
End: 2021-01-14

## 2021-01-14 ENCOUNTER — CLINICAL SUPPORT (OUTPATIENT)
Dept: FAMILY MEDICINE CLINIC | Age: 71
End: 2021-01-14
Payer: MEDICARE

## 2021-01-14 VITALS — DIASTOLIC BLOOD PRESSURE: 68 MMHG | SYSTOLIC BLOOD PRESSURE: 140 MMHG

## 2021-01-14 DIAGNOSIS — E11.65 TYPE 2 DIABETES MELLITUS WITH HYPERGLYCEMIA, WITHOUT LONG-TERM CURRENT USE OF INSULIN (HCC): ICD-10-CM

## 2021-01-14 DIAGNOSIS — Z23 ENCOUNTER FOR IMMUNIZATION: ICD-10-CM

## 2021-01-14 DIAGNOSIS — E78.5 HYPERLIPIDEMIA, UNSPECIFIED HYPERLIPIDEMIA TYPE: ICD-10-CM

## 2021-01-14 DIAGNOSIS — Z23 NEEDS FLU SHOT: ICD-10-CM

## 2021-01-14 DIAGNOSIS — E55.9 VITAMIN D DEFICIENCY: ICD-10-CM

## 2021-01-14 DIAGNOSIS — I10 ESSENTIAL HYPERTENSION: ICD-10-CM

## 2021-01-14 PROCEDURE — 90694 VACC AIIV4 NO PRSRV 0.5ML IM: CPT | Performed by: INTERNAL MEDICINE

## 2021-01-14 PROCEDURE — G0008 ADMIN INFLUENZA VIRUS VAC: HCPCS | Performed by: INTERNAL MEDICINE

## 2021-01-14 NOTE — PROGRESS NOTES
Patient presents for influenza vaccine injection. Consent given to patient and signed. Area prepped and injection given in right deltoid. No signs nor symptoms of adverse reaction noted. Patient tolerated injection well. Patient rested for 5 minutes prior to BP check. Reading recorded in patient chart.

## 2021-01-15 LAB
25(OH)D3+25(OH)D2 SERPL-MCNC: 32.5 NG/ML (ref 30–100)
ALBUMIN SERPL-MCNC: 4.3 G/DL (ref 3.8–4.8)
ALBUMIN/GLOB SERPL: 1.5 {RATIO} (ref 1.2–2.2)
ALP SERPL-CCNC: 79 IU/L (ref 39–117)
ALT SERPL-CCNC: 33 IU/L (ref 0–44)
AST SERPL-CCNC: 20 IU/L (ref 0–40)
BILIRUB SERPL-MCNC: 0.5 MG/DL (ref 0–1.2)
BUN SERPL-MCNC: 13 MG/DL (ref 8–27)
BUN/CREAT SERPL: 12 (ref 10–24)
CALCIUM SERPL-MCNC: 9.5 MG/DL (ref 8.6–10.2)
CHLORIDE SERPL-SCNC: 101 MMOL/L (ref 96–106)
CHOLEST SERPL-MCNC: 124 MG/DL (ref 100–199)
CO2 SERPL-SCNC: 20 MMOL/L (ref 20–29)
CREAT SERPL-MCNC: 1.07 MG/DL (ref 0.76–1.27)
EST. AVERAGE GLUCOSE BLD GHB EST-MCNC: 148 MG/DL
GLOBULIN SER CALC-MCNC: 2.8 G/DL (ref 1.5–4.5)
GLUCOSE SERPL-MCNC: 121 MG/DL (ref 65–99)
HBA1C MFR BLD: 6.8 % (ref 4.8–5.6)
HDLC SERPL-MCNC: 37 MG/DL
INTERPRETATION, 910389: NORMAL
LDLC SERPL CALC-MCNC: 66 MG/DL (ref 0–99)
Lab: NORMAL
POTASSIUM SERPL-SCNC: 4.3 MMOL/L (ref 3.5–5.2)
PROT SERPL-MCNC: 7.1 G/DL (ref 6–8.5)
SODIUM SERPL-SCNC: 140 MMOL/L (ref 134–144)
TRIGL SERPL-MCNC: 112 MG/DL (ref 0–149)
VLDLC SERPL CALC-MCNC: 21 MG/DL (ref 5–40)

## 2021-02-18 ENCOUNTER — DOCUMENTATION ONLY (OUTPATIENT)
Dept: FAMILY MEDICINE CLINIC | Age: 71
End: 2021-02-18

## 2021-02-18 ENCOUNTER — IMMUNIZATION (OUTPATIENT)
Dept: INTERNAL MEDICINE CLINIC | Age: 71
End: 2021-02-18
Payer: MEDICARE

## 2021-02-18 ENCOUNTER — DOCUMENTATION ONLY (OUTPATIENT)
Dept: INTERNAL MEDICINE CLINIC | Age: 71
End: 2021-02-18

## 2021-02-18 DIAGNOSIS — Z23 ENCOUNTER FOR IMMUNIZATION: Primary | ICD-10-CM

## 2021-02-18 PROCEDURE — 0011A COVID-19, MRNA, LNP-S, PF, 100MCG/0.5ML DOSE(MODERNA): CPT | Performed by: FAMILY MEDICINE

## 2021-02-18 PROCEDURE — 91301 COVID-19, MRNA, LNP-S, PF, 100MCG/0.5ML DOSE(MODERNA): CPT | Performed by: FAMILY MEDICINE

## 2021-02-18 NOTE — PROGRESS NOTES
Alem Patterson is a 79 y.o. male who presents for   Chief Complaint   Patient presents with    Immunization/Injection     [id number] Moderna 1st dose    ,  He denies any symptoms , reactions or allergies that would exclude them from being immunized today. Risks and adverse reactions were discussed and the VIS was given to them. All questions were addressed. He was observed for 15  min post injection. There were no reactions observed.     Kaern Torres LPN

## 2021-02-18 NOTE — PROGRESS NOTES
Verbal order for the Covid 19 Vaccine given at the [facility]. No order was in for today's vaccine 1st dose of Moderna. Could not change from standard order to vorb (verbal order read back) or there wouldn't of been a co-sign from provider.

## 2021-03-18 ENCOUNTER — IMMUNIZATION (OUTPATIENT)
Dept: INTERNAL MEDICINE CLINIC | Age: 71
End: 2021-03-18
Payer: MEDICARE

## 2021-03-18 ENCOUNTER — DOCUMENTATION ONLY (OUTPATIENT)
Dept: INTERNAL MEDICINE CLINIC | Age: 71
End: 2021-03-18

## 2021-03-18 DIAGNOSIS — Z23 ENCOUNTER FOR IMMUNIZATION: Primary | ICD-10-CM

## 2021-03-18 PROCEDURE — 91301 COVID-19, MRNA, LNP-S, PF, 100MCG/0.5ML DOSE(MODERNA): CPT | Performed by: INTERNAL MEDICINE

## 2021-03-18 PROCEDURE — 0012A COVID-19, MRNA, LNP-S, PF, 100MCG/0.5ML DOSE(MODERNA): CPT | Performed by: INTERNAL MEDICINE

## 2021-03-18 NOTE — PROGRESS NOTES
Chief Complaint   Patient presents with    Immunization/Injection     Mikayla Book Vaccine #2     Patient presents for the following vaccines: Moderna Covid Vaccine #2. Patient consent obtained by patient. Patient tolerated procedure well at left deltoid. Patient observed for a period of 15 minutes post injection to ensure no reaction.        Lot # [id number]  Exp: 04/18/21   Kristie Jenkins

## 2021-04-26 ENCOUNTER — VIRTUAL VISIT (OUTPATIENT)
Dept: FAMILY MEDICINE CLINIC | Age: 71
End: 2021-04-26
Payer: MEDICARE

## 2021-04-26 DIAGNOSIS — E11.65 TYPE 2 DIABETES MELLITUS WITH HYPERGLYCEMIA, WITHOUT LONG-TERM CURRENT USE OF INSULIN (HCC): Primary | ICD-10-CM

## 2021-04-26 DIAGNOSIS — I10 ESSENTIAL HYPERTENSION: ICD-10-CM

## 2021-04-26 DIAGNOSIS — F17.200 SMOKING: ICD-10-CM

## 2021-04-26 DIAGNOSIS — E05.90 SUBCLINICAL HYPERTHYROIDISM: ICD-10-CM

## 2021-04-26 DIAGNOSIS — E78.5 HYPERLIPIDEMIA, UNSPECIFIED HYPERLIPIDEMIA TYPE: ICD-10-CM

## 2021-04-26 DIAGNOSIS — E55.9 VITAMIN D DEFICIENCY: ICD-10-CM

## 2021-04-26 PROCEDURE — 3017F COLORECTAL CA SCREEN DOC REV: CPT | Performed by: INTERNAL MEDICINE

## 2021-04-26 PROCEDURE — G8756 NO BP MEASURE DOC: HCPCS | Performed by: INTERNAL MEDICINE

## 2021-04-26 PROCEDURE — 2022F DILAT RTA XM EVC RTNOPTHY: CPT | Performed by: INTERNAL MEDICINE

## 2021-04-26 PROCEDURE — 99214 OFFICE O/P EST MOD 30 MIN: CPT | Performed by: INTERNAL MEDICINE

## 2021-04-26 PROCEDURE — G8427 DOCREV CUR MEDS BY ELIG CLIN: HCPCS | Performed by: INTERNAL MEDICINE

## 2021-04-26 PROCEDURE — 1101F PT FALLS ASSESS-DOCD LE1/YR: CPT | Performed by: INTERNAL MEDICINE

## 2021-04-26 PROCEDURE — G8510 SCR DEP NEG, NO PLAN REQD: HCPCS | Performed by: INTERNAL MEDICINE

## 2021-04-26 PROCEDURE — 3044F HG A1C LEVEL LT 7.0%: CPT | Performed by: INTERNAL MEDICINE

## 2021-04-26 NOTE — Clinical Note
Pls sched patient to get the low dose CT lung at [facility] (having trouble scheduling this) and pls get last colonoscopy from downstairs

## 2021-04-26 NOTE — PROGRESS NOTES
1. Have you been to the ER, urgent care clinic since your last visit? Hospitalized since your last visit? No    2. Have you seen or consulted any other health care providers outside of the [address] [place] [address] since your last visit? Include any pap smears or colon screening.  No      Health Maintenance Due   Topic Date Due    Shingrix Vaccine Age 49> (1 of 2) Never done    Colorectal Cancer Screening Combo  12/26/2019

## 2021-04-26 NOTE — PATIENT INSTRUCTIONS
Stopping Smoking: Care Instructions Your Care Instructions Cigarette smokers crave the nicotine in cigarettes. Giving it up is much harder than simply changing a habit. Your body has to stop craving the nicotine. It is hard to quit, but you can do it. There are many tools that people use to quit smoking. You may find that combining tools works best for you. There are several steps to quitting. First you get ready to quit. Then you get support to help you. After that, you learn new skills and behaviors to become a nonsmoker. For many people, a necessary step is getting and using medicine. Your doctor will help you set up the plan that best meets your needs. You may want to attend a smoking cessation program to help you quit smoking. When you choose a program, look for one that has proven success. Ask your doctor for ideas. You will greatly increase your chances of success if you take medicine as well as get counseling or join a cessation program. 
Some of the changes you feel when you first quit tobacco are uncomfortable. Your body will miss the nicotine at first, and you may feel short-tempered and grumpy. You may have trouble sleeping or concentrating. Medicine can help you deal with these symptoms. You may struggle with changing your smoking habits and rituals. The last step is the tricky one: Be prepared for the smoking urge to continue for a time. This is a lot to deal with, but keep at it. You will feel better. Follow-up care is a key part of your treatment and safety. Be sure to make and go to all appointments, and call your doctor if you are having problems. It's also a good idea to know your test results and keep a list of the medicines you take. How can you care for yourself at home? · Ask your family, friends, and coworkers for support. You have a better chance of quitting if you have help and support.  
· Join a support group, such as Nicotine Anonymous, for people who are trying to quit smoking. · Consider signing up for a smoking cessation program, such as the American Lung Association's Freedom from Smoking program. 
· Get text messaging support. Go to the website at www.smokefree. gov to sign up for the [facility] program. 
· Set a quit date. Pick your date carefully so that it is not right in the middle of a big deadline or stressful time. Once you quit, do not even take a puff. Get rid of all ashtrays and lighters after your last cigarette. Clean your house and your clothes so that they do not smell of smoke. · Learn how to be a nonsmoker. Think about ways you can avoid those things that make you reach for a cigarette. ? Avoid situations that put you at greatest risk for smoking. For some people, it is hard to have a drink with friends without smoking. For others, they might skip a coffee break with coworkers who smoke. ? Change your daily routine. Take a different route to work or eat a meal in a different place. · Cut down on stress. Calm yourself or release tension by doing an activity you enjoy, such as reading a book, taking a hot bath, or gardening. · Talk to your doctor or pharmacist about nicotine replacement therapy, which replaces the nicotine in your body. You still get nicotine but you do not use tobacco. Nicotine replacement products help you slowly reduce the amount of nicotine you need. These products come in several forms, many of them available over-the-counter: ? Nicotine patches ? Nicotine gum and lozenges ? Nicotine inhaler · Ask your doctor about bupropion (Wellbutrin) or varenicline (Chantix), which are prescription medicines. They do not contain nicotine. They help you by reducing withdrawal symptoms, such as stress and anxiety. · Some people find hypnosis, acupuncture, and massage helpful for ending the smoking habit. · Eat a healthy diet and get regular exercise. Having healthy habits will help your body move past its craving for nicotine.  
· Be prepared to keep trying. Most people are not successful the first few times they try to quit. Do not get mad at yourself if you smoke again. Make a list of things you learned and think about when you want to try again, such as next week, next month, or next year. Where can you learn more? Go to http://www.gray.com/ Enter Q870 in the search box to learn more about \"Stopping Smoking: Care Instructions. \" Current as of: March 12, 2020               Content Version: 12.8 © 6722-2274 [employer]. Care instructions adapted under license by [employer] (which disclaims liability or warranty for this information). If you have questions about a medical condition or this instruction, always ask your healthcare professional. [address] any warranty or liability for your use of this information.

## 2021-04-26 NOTE — PROGRESS NOTES
Maddy Moreira is a 79 y.o. male who was seen by synchronous (real-time) audio-video technology on 2021 for Follow Up Chronic Condition        Assessment & Plan:   Diagnoses and all orders for this visit:    1. Type 2 diabetes mellitus with hyperglycemia, without long-term current use of insulin (HCC)-will adjust meds once A1c is back  -     HEMOGLOBIN A1C WITH EAG; Future    2. Essential hypertension-check BP in office shortly/advised to take meds prior to coming in  -     CBC WITH AUTOMATED DIFF; Future  -     METABOLIC PANEL, COMPREHENSIVE; Future    3. Hyperlipidemia, unspecified hyperlipidemia type-goal LDL of less than 70 on Lipitor bec of DM  -     LIPID PANEL; Future    4. Subclinical hyperthyroidism-continue to monitor thyroid fucntion tests as he is asymptomatic  -     TSH 3RD GENERATION; Future    5. Vitamin D deficiency-on rx  -     VITAMIN D, 25 HYDROXY; Future    6. Smoking-has the Chantix but is not ready to quit; will sched US for AAA screening from our end as he has had trouble scheduling this        Follow-up and Dispositions    · Return in about 3 months (around 2021) for follow up; labs, nurse visit for BP check shortly-pls call to sched patient and wife for labs. Routing History        712  Subjective:     Health Maintenance Due   Topic Date Due    Shingrix Vaccine Age 49> (1 of 2) Never done    Colorectal Cancer Screening Combo -done downstairs 2 yrs ago per wife 2019       Low dose CT chest done?not yet/had problems with scheduling  Still smoking? yes  Chantix-not taking   Dad and brother  from smoking per his wife      Lab Results   Component Value Date/Time    Hemoglobin A1c 6.8 (H) 2021 10:19 AM    Hemoglobin A1c (POC) 7.9 2020 12:58 PM       BP Readings from Last 3 Encounters:   21 (!) 140/68   10/14/20 (!) 148/79   20 153/82   home BP-does not check      Prior to Admission medications    Medication Sig Start Date End Date Taking? Authorizing Provider   ergocalciferol (ERGOCALCIFEROL) 1,250 mcg (50,000 unit) capsule Take 1 Cap by mouth every seven (7) days. 10/23/20  Yes Shannan Wilcox MD   atorvastatin (LIPITOR) 10 mg tablet Take 1 Tab by mouth daily. 10/23/20  Yes Shannan Wilcox MD   amLODIPine (NORVASC) 10 mg tablet TAKE 1 TABLET EVERY DAY 10/13/20  Yes Beto Colon MD   bisoprolol-hydroCHLOROthiazide (ZIAC) 10-6.25 mg per tablet TAKE 2 TABLETS EVERY DAY 10/13/20  Yes Shannan Wilcox MD   metFORMIN (GLUCOPHAGE) 1,000 mg tablet Take 1 Tab by mouth two (2) times daily (with meals). 10/13/20  Yes Beto Colon MD     Patient Active Problem List    Diagnosis Date Noted    Tubular adenoma of colon 10/13/2020    Advanced directives, counseling/discussion 05/21/2018    Overweight (BMI 25.0-29.9) 02/20/2018    Type 2 diabetes mellitus with hyperglycemia, without long-term current use of insulin ([facility] 10/18/2016    Primary osteoarthritis of left knee 10/18/2016    Subclinical hyperthyroidism 07/18/2016    Essential hypertension 10/15/2015    Smoking 12/02/2013       ROS  Pt denies: Wt loss, Fever/Chills, HA, Visual changes, Fatigue, Chest pain, SOB, SALMON, Abd pain, N/V/D/C, Blood in stool or urine, Edema. Pertinent positive as above in HPI. All others were negative  Objective:   No flowsheet data found. General: alert, cooperative, no distress   Mental  status: normal mood, behavior, speech, dress, motor activity, and thought processes, able to follow commands   HENT: NCAT   Neck: no visualized mass   Resp: no respiratory distress   Neuro: no gross deficits   Skin: no discoloration or lesions of concern on visible areas   Psychiatric: normal affect, consistent with stated mood, no evidence of hallucinations     Additional exam findings: none      We discussed the expected course, resolution and complications of the diagnosis(es) in detail.   Medication risks, benefits, costs, interactions, and alternatives were discussed as indicated. I advised him to contact the office if his condition worsens, changes or fails to improve as anticipated. He expressed understanding with the diagnosis(es) and plan. Faustoanton Dee, was evaluated through a synchronous (real-time) audio-video encounter. The patient (or guardian if applicable) is aware that this is a billable service. Verbal consent to proceed has been obtained within the past 12 months. The visit was conducted pursuant to the emergency declaration under the [address] [place] [address], [address] [place] [address] authority and the [employer] General Act. Patient identification was verified, and a caregiver was present when appropriate. The patient was located in a state where the provider was credentialed to provide care.     Shaunna Mathews MD

## 2021-04-27 NOTE — PROGRESS NOTES
Patient scheduled for May 4th @ 1:30pm check in at 1pm. Must wear mask, bring a list of medication and no children under 15 yrs old allowed. Patient informed.

## 2021-04-28 NOTE — PROGRESS NOTES
Called patient home number again. His wife answered the phone. I informed her of CT appointment. May 4th check in @ 1PM @ . [address] in [address].

## 2021-07-22 DIAGNOSIS — I10 ESSENTIAL HYPERTENSION: ICD-10-CM

## 2021-07-22 RX ORDER — AMLODIPINE BESYLATE 10 MG/1
TABLET ORAL
Qty: 90 TABLET | Refills: 2 | Status: SHIPPED | OUTPATIENT
Start: 2021-07-22 | End: 2021-11-02 | Stop reason: SDUPTHER

## 2021-08-02 ENCOUNTER — OFFICE VISIT (OUTPATIENT)
Dept: FAMILY MEDICINE CLINIC | Age: 71
End: 2021-08-02
Payer: MEDICARE

## 2021-08-02 VITALS
WEIGHT: 161.8 LBS | HEIGHT: 66 IN | TEMPERATURE: 98.3 F | HEART RATE: 58 BPM | SYSTOLIC BLOOD PRESSURE: 137 MMHG | BODY MASS INDEX: 26 KG/M2 | DIASTOLIC BLOOD PRESSURE: 72 MMHG

## 2021-08-02 DIAGNOSIS — E11.65 TYPE 2 DIABETES MELLITUS WITH HYPERGLYCEMIA, WITHOUT LONG-TERM CURRENT USE OF INSULIN (HCC): ICD-10-CM

## 2021-08-02 DIAGNOSIS — E05.90 SUBCLINICAL HYPERTHYROIDISM: ICD-10-CM

## 2021-08-02 DIAGNOSIS — I10 ESSENTIAL HYPERTENSION: Primary | ICD-10-CM

## 2021-08-02 DIAGNOSIS — E78.5 HYPERLIPIDEMIA, UNSPECIFIED HYPERLIPIDEMIA TYPE: ICD-10-CM

## 2021-08-02 DIAGNOSIS — E55.9 VITAMIN D DEFICIENCY: ICD-10-CM

## 2021-08-02 DIAGNOSIS — Z87.891 PERSONAL HISTORY OF TOBACCO USE, PRESENTING HAZARDS TO HEALTH: ICD-10-CM

## 2021-08-02 LAB — HBA1C MFR BLD HPLC: 6.2 %

## 2021-08-02 PROCEDURE — G8432 DEP SCR NOT DOC, RNG: HCPCS | Performed by: INTERNAL MEDICINE

## 2021-08-02 PROCEDURE — 3044F HG A1C LEVEL LT 7.0%: CPT | Performed by: INTERNAL MEDICINE

## 2021-08-02 PROCEDURE — G8419 CALC BMI OUT NRM PARAM NOF/U: HCPCS | Performed by: INTERNAL MEDICINE

## 2021-08-02 PROCEDURE — 83036 HEMOGLOBIN GLYCOSYLATED A1C: CPT | Performed by: INTERNAL MEDICINE

## 2021-08-02 PROCEDURE — 1101F PT FALLS ASSESS-DOCD LE1/YR: CPT | Performed by: INTERNAL MEDICINE

## 2021-08-02 PROCEDURE — G8536 NO DOC ELDER MAL SCRN: HCPCS | Performed by: INTERNAL MEDICINE

## 2021-08-02 PROCEDURE — 2022F DILAT RTA XM EVC RTNOPTHY: CPT | Performed by: INTERNAL MEDICINE

## 2021-08-02 PROCEDURE — 3017F COLORECTAL CA SCREEN DOC REV: CPT | Performed by: INTERNAL MEDICINE

## 2021-08-02 PROCEDURE — G8754 DIAS BP LESS 90: HCPCS | Performed by: INTERNAL MEDICINE

## 2021-08-02 PROCEDURE — 99214 OFFICE O/P EST MOD 30 MIN: CPT | Performed by: INTERNAL MEDICINE

## 2021-08-02 PROCEDURE — G8427 DOCREV CUR MEDS BY ELIG CLIN: HCPCS | Performed by: INTERNAL MEDICINE

## 2021-08-02 PROCEDURE — G8752 SYS BP LESS 140: HCPCS | Performed by: INTERNAL MEDICINE

## 2021-08-02 NOTE — PROGRESS NOTES
Assessment/ Plan:   Diagnoses and all orders for this visit:    1. Essential hypertension-controlled, continue with present meds  -     CBC WITH AUTOMATED DIFF; Future  -     METABOLIC PANEL, COMPREHENSIVE; Future  -     LIPID PANEL; Future    2. Subclinical hyperthyroidism-check TSH to make sure this is not why he lost weight  -     TSH 3RD GENERATION; Future    3. Type 2 diabetes mellitus with hyperglycemia, without long-term current use of insulin (HCC)-at goal on current med  -     AMB POC HEMOGLOBIN A1C  Last Point of Care HGB A1C  Hemoglobin A1c (POC)   Date Value Ref Range Status   08/02/2021 6.2 % Final      4. Vitamin D deficiency-on rx  -     VITAMIN D, 25 HYDROXY; Future    5. Personal history of tobacco use, presenting hazards to health-smoking cessation encouraged and discussed; we scheduled his low dose Ct prior to leaving the office since he has missed doing this several times  -     CT LOW DOSE LUNG CANCER SCREENING; Future    6. Hyperlipidemia-goal LDL of less than 70 on Lipitor    Follow-up and Dispositions    · Return in about 3 months (around 11/2/2021) for follow up.    Routing History                Chief Complaint   Patient presents with    Follow Up Chronic Condition       Pt is a 79y.o. year old male who presents for follow up of his chronic medical problems    Health Maintenance Due   Topic Date Due    Shingrix Vaccine Age 49> (1 of 2) Never done    Colorectal Cancer Screening Combo -said it was done downstairs/will try to obtain results 12/26/2019     His insurance wants him to have a urine test done-sent by mail to them    Wt Readings from Last 3 Encounters:   08/02/21 161 lb 12.8 oz (73.4 kg)   01/21/20 171 lb (77.6 kg)   10/16/19 169 lb (76.7 kg)     Retired from work  Still smoking-has not done, the low dose CT that has been ordered a lot of times    BP Readings from Last 3 Encounters:   08/02/21 137/72   01/14/21 (!) 140/68   10/14/20 (!) 148/79       Lab Results   Component Value Date/Time    Hemoglobin A1c 6.8 (H) 01/14/2021 10:19 AM    Hemoglobin A1c (POC) 6.2 08/02/2021 01:53 PM       Lab Results   Component Value Date/Time    Cholesterol, total 124 01/14/2021 10:19 AM    HDL Cholesterol 37 (L) 01/14/2021 10:19 AM    LDL, calculated 66 01/14/2021 10:19 AM    LDL, calculated 125 (H) 10/16/2019 01:37 AM    VLDL, calculated 21 01/14/2021 10:19 AM    VLDL, calculated 26 10/16/2019 01:37 AM    Triglyceride 112 01/14/2021 10:19 AM    CHOL/HDL Ratio 4.1 07/20/2010 09:50 AM   Now on Lipitor-compliant    Lab Results   Component Value Date/Time    TSH 0.950 10/14/2020 01:08 AM       Lab Results   Component Value Date/Time    VITAMIN D, 25-HYDROXY 32.5 01/14/2021 10:19 AM     On rx         ROS:    Pt denies: Wt loss, Fever/Chills, HA, Visual changes, Fatigue, Chest pain, SOB, SALMON, Abd pain, N/V/D/C, Blood in stool or urine, Edema. Pertinent positive as above in HPI. All others were negative    Patient Active Problem List   Diagnosis Code    Smoking F17.200    Essential hypertension I10    Subclinical hyperthyroidism E05.90    Type 2 diabetes mellitus with hyperglycemia, without long-term current use of insulin (HCC) E11.65    Primary osteoarthritis of left knee M17.12    Overweight (BMI 25.0-29. 9) E66.3    Advanced directives, counseling/discussion Z71.89    Tubular adenoma of colon D12.6       Past Medical History:   Diagnosis Date    Hypertension        Current Outpatient Medications   Medication Sig Dispense Refill    amLODIPine (NORVASC) 10 mg tablet TAKE 1 TABLET EVERY DAY 90 Tablet 2    atorvastatin (LIPITOR) 10 mg tablet TAKE 1 TABLET EVERY DAY 90 Tab 1    ergocalciferol (ERGOCALCIFEROL) 1,250 mcg (50,000 unit) capsule Take 1 Cap by mouth every seven (7) days. 12 Cap 1    bisoprolol-hydroCHLOROthiazide (ZIAC) 10-6.25 mg per tablet TAKE 2 TABLETS EVERY  Tab 2    metFORMIN (GLUCOPHAGE) 1,000 mg tablet Take 1 Tab by mouth two (2) times daily (with meals).  180 Tab 2 Social History     Tobacco Use   Smoking Status Light Tobacco Smoker   Smokeless Tobacco Never Used       No Known Allergies    Patient Labs were reviewed: yes    Patient Past Records were reviewed: yes      Objective:     Vitals:    08/02/21 1335   BP: 137/72   Pulse: (!) 58   Temp: 98.3 °F (36.8 °C)   TempSrc: Temporal   Weight: 161 lb 12.8 oz (73.4 kg)   Height: 5' 6\" (1.676 m)     Body mass index is 26.12 kg/m². Exam:   Appearance: alert, well appearing,  oriented to person, place, and time, acyanotic, in no respiratory distress and well hydrated. HEENT:  NC/AT, pink conj, anicteric sclerae  Neck:  No cervical lymphadenopathy, no JVD, no thyromegaly, no carotid bruit  Heart:  RRR without M/R/G  Lungs:  CTAB, no rhonchi, rales, or wheezes with good air exchange   Abdomen:  Non-tender, pos bowel sounds, no hepatosplenomegaly  Ext:  No C/C/E    Skin: no rash  Neuro: no lateralizing signs, CNs II-XII intact            I have discussed the diagnosis with the patient and the intended plan as seen in the above orders. The patient has received an After-Visit Summary and questions were answered concerning future plans. Medication Side Effects and Warnings were discussed with patient: yes    Patient verbalized understanding of above instructions. Dorcas Galvan MD  Internal Medicine  [facility]        Discussed with patient current guidelines for screening for lung cancer. Current recommendations are to obtain yearly screening LDCT yearly for age 46-80, or until smoke free for 15 years. Patient has 40 pack year history of cigarette smoking and currently smoking. Discussed with patient risks and benefits of screening, including over-diagnosis, false positive rate, and total radiation exposure. Patient currently exhibits no signs or symptoms suggestive of lung cancer.   Discussed with patient importance of compliance with yearly annual lung cancer screenings and willingness to undergo diagnosis and treatment if screening scan is positive. In addition, patient was counseled regarding (remaining smoke free/total smoking cessation).

## 2021-08-02 NOTE — PATIENT INSTRUCTIONS
Learning About Benefits From Quitting Smoking  How does quitting smoking make you healthier? If you're thinking about quitting smoking, you may have a few reasons to be smoke-free. Your health may be one of them. · When you quit smoking, you lower your risks for cancer, lung disease, heart attack, stroke, blood vessel disease, and blindness from macular degeneration. · When you're smoke-free, you get sick less often, and you heal faster. You are less likely to get colds, flu, bronchitis, and pneumonia. · As a nonsmoker, you may find that your mood is better and you are less stressed. When and how will you feel healthier? Quitting has real health benefits that start from day 1 of being smoke-free. And the longer you stay smoke-free, the healthier you get and the better you feel. The first hours  · After just 20 minutes, your blood pressure and heart rate go down. That means there's less stress on your heart and blood vessels. · Within 12 hours, the level of carbon monoxide in your blood drops back to normal. That makes room for more oxygen. With more oxygen in your body, you may notice that you have more energy than when you smoked. After 2 weeks  · Your lungs start to work better. · Your risk of heart attack starts to drop. After 1 month  · When your lungs are clear, you cough less and breathe deeper, so it's easier to be active. · Your sense of taste and smell return. That means you can enjoy food more than you have since you started smoking. Over the years  · Over the years, your risks of heart disease, heart attack, and stroke are lower. · After 10 years, your risk of dying from lung cancer is cut by about half. And your risk for many other types of cancer is lower too. How would quitting help others in your life? When you quit smoking, you improve the health of everyone who now breathes in your smoke. · Their heart, lung, and cancer risks drop, much like yours. · They are sick less.  For babies and small children, living smoke-free means they're less likely to have ear infections, pneumonia, and bronchitis. · If you're a woman who is or will be pregnant someday, quitting smoking means a healthier . · Children who are close to you are less likely to become adult smokers. Where can you learn more? Go to http://www.gray.com/  Enter O319 in the search box to learn more about \"Learning About Benefits From Quitting Smoking. \"  Current as of: 2020               Content Version: 12.8  © 7261-3392 Healthwise, [employer]. Care instructions adapted under license by [employer] (which disclaims liability or warranty for this information). If you have questions about a medical condition or this instruction, always ask your healthcare professional. [address] any warranty or liability for your use of this information.

## 2021-08-02 NOTE — PROGRESS NOTES
Patient seen for routine follow up without complaint. 1. Have you been to the ER, urgent care clinic since your last visit? Hospitalized since your last visit? No    2. Have you seen or consulted any other health care providers outside of the [address] [place] [address] since your last visit? Include any pap smears or colon screening.  No    Health Maintenance Due   Topic Date Due    Shingrix Vaccine Age 49> (1 of 2) Never done    Colorectal Cancer Screening Combo  12/26/2019

## 2021-08-03 LAB
25(OH)D3+25(OH)D2 SERPL-MCNC: 37.4 NG/ML (ref 30–100)
ALBUMIN SERPL-MCNC: 4.6 G/DL (ref 3.8–4.8)
ALBUMIN/GLOB SERPL: 1.7 {RATIO} (ref 1.2–2.2)
ALP SERPL-CCNC: 92 IU/L (ref 48–121)
ALT SERPL-CCNC: 32 IU/L (ref 0–44)
AST SERPL-CCNC: 25 IU/L (ref 0–40)
BASOPHILS # BLD AUTO: 0.1 X10E3/UL (ref 0–0.2)
BASOPHILS NFR BLD AUTO: 1 %
BILIRUB SERPL-MCNC: 0.4 MG/DL (ref 0–1.2)
BUN SERPL-MCNC: 11 MG/DL (ref 8–27)
BUN/CREAT SERPL: 10 (ref 10–24)
CALCIUM SERPL-MCNC: 10 MG/DL (ref 8.6–10.2)
CHLORIDE SERPL-SCNC: 103 MMOL/L (ref 96–106)
CHOLEST SERPL-MCNC: 141 MG/DL (ref 100–199)
CO2 SERPL-SCNC: 22 MMOL/L (ref 20–29)
CREAT SERPL-MCNC: 1.08 MG/DL (ref 0.76–1.27)
EOSINOPHIL # BLD AUTO: 0.2 X10E3/UL (ref 0–0.4)
EOSINOPHIL NFR BLD AUTO: 2 %
ERYTHROCYTE [DISTWIDTH] IN BLOOD BY AUTOMATED COUNT: 13.8 % (ref 11.6–15.4)
GLOBULIN SER CALC-MCNC: 2.7 G/DL (ref 1.5–4.5)
GLUCOSE SERPL-MCNC: 103 MG/DL (ref 65–99)
HCT VFR BLD AUTO: 43.4 % (ref 37.5–51)
HDLC SERPL-MCNC: 42 MG/DL
HGB BLD-MCNC: 15 G/DL (ref 13–17.7)
IMM GRANULOCYTES # BLD AUTO: 0 X10E3/UL (ref 0–0.1)
IMM GRANULOCYTES NFR BLD AUTO: 0 %
IMP & REVIEW OF LAB RESULTS: NORMAL
LDLC SERPL CALC-MCNC: 76 MG/DL (ref 0–99)
LYMPHOCYTES # BLD AUTO: 1.8 X10E3/UL (ref 0.7–3.1)
LYMPHOCYTES NFR BLD AUTO: 24 %
MCH RBC QN AUTO: 32.4 PG (ref 26.6–33)
MCHC RBC AUTO-ENTMCNC: 34.6 G/DL (ref 31.5–35.7)
MCV RBC AUTO: 94 FL (ref 79–97)
MONOCYTES # BLD AUTO: 0.6 X10E3/UL (ref 0.1–0.9)
MONOCYTES NFR BLD AUTO: 8 %
NEUTROPHILS # BLD AUTO: 5 X10E3/UL (ref 1.4–7)
NEUTROPHILS NFR BLD AUTO: 65 %
PLATELET # BLD AUTO: 209 X10E3/UL (ref 150–450)
POTASSIUM SERPL-SCNC: 5.1 MMOL/L (ref 3.5–5.2)
PROT SERPL-MCNC: 7.3 G/DL (ref 6–8.5)
RBC # BLD AUTO: 4.63 X10E6/UL (ref 4.14–5.8)
SODIUM SERPL-SCNC: 140 MMOL/L (ref 134–144)
TRIGL SERPL-MCNC: 127 MG/DL (ref 0–149)
TSH SERPL DL<=0.005 MIU/L-ACNC: 0.74 UIU/ML (ref 0.45–4.5)
VLDLC SERPL CALC-MCNC: 23 MG/DL (ref 5–40)
WBC # BLD AUTO: 7.7 X10E3/UL (ref 3.4–10.8)

## 2021-08-04 NOTE — LETTER
8/4/2021    Mr. Gaines [address] [facility] [id number]      Dear Manny Dolan:    Please find your most recent results below. Resulted Orders   AMB POC HEMOGLOBIN A1C   Result Value Ref Range    Hemoglobin A1c (POC) 6.2 %   CBC WITH AUTOMATED DIFF   Result Value Ref Range    WBC 7.7 3.4 - 10.8 x10E3/uL    RBC 4.63 4.14 - 5.80 x10E6/uL    HGB 15.0 13.0 - 17.7 g/dL    HCT 43.4 37.5 - 51.0 %    MCV 94 79 - 97 fL    MCH 32.4 26.6 - 33.0 pg    MCHC 34.6 31.5 - 35.7 g/dL    RDW 13.8 11.6 - 15.4 %    PLATELET 624 668 - 416 x10E3/uL    NEUTROPHILS 65 Not Estab. %    Lymphocytes 24 Not Estab. %    MONOCYTES 8 Not Estab. %    EOSINOPHILS 2 Not Estab. %    BASOPHILS 1 Not Estab. %    ABS. NEUTROPHILS 5.0 1.4 - 7.0 x10E3/uL    Abs Lymphocytes 1.8 0.7 - 3.1 x10E3/uL    ABS. MONOCYTES 0.6 0.1 - 0.9 x10E3/uL    ABS. EOSINOPHILS 0.2 0.0 - 0.4 x10E3/uL    ABS. BASOPHILS 0.1 0.0 - 0.2 x10E3/uL    IMMATURE GRANULOCYTES 0 Not Estab. %    ABS. IMM. GRANS. 0.0 0.0 - 0.1 x10E3/uL    Narrative    Performed at:  [address]  [id number]  : Jillian Magaña MD, Phone:  [phone]   METABOLIC PANEL, COMPREHENSIVE   Result Value Ref Range    Glucose 103 (H) 65 - 99 mg/dL    BUN 11 8 - 27 mg/dL    Creatinine 1.08 0.76 - 1.27 mg/dL    GFR est non-AA 69 >59 mL/min/1.73    GFR est AA 80 >59 mL/min/1.73    BUN/Creatinine ratio 10 10 - 24    Sodium 140 134 - 144 mmol/L    Potassium 5.1 3.5 - 5.2 mmol/L    Chloride 103 96 - 106 mmol/L    CO2 22 20 - 29 mmol/L    Calcium 10.0 8.6 - 10.2 mg/dL    Protein, total 7.3 6.0 - 8.5 g/dL    Albumin 4.6 3.8 - 4.8 g/dL    GLOBULIN, TOTAL 2.7 1.5 - 4.5 g/dL    A-G Ratio 1.7 1.2 - 2.2    Bilirubin, total 0.4 0.0 - 1.2 mg/dL    Alk.  phosphatase 92 48 - 121 IU/L    AST (SGOT) 25 0 - 40 IU/L    ALT (SGPT) 32 0 - 44 IU/L    Narrative    Performed at:  [address]  [id number]  : Sophie Muniz MD, Phone:  [phone]   LIPID PANEL   Result Value Ref Range    Cholesterol, total 141 100 - 199 mg/dL    Triglyceride 127 0 - 149 mg/dL    HDL Cholesterol 42 >39 mg/dL    VLDL, calculated 23 5 - 40 mg/dL    LDL, calculated 76 0 - 99 mg/dL    Narrative    Performed at:  [address]  [id number]  : Sophie Muniz MD, Phone:  [phone]   VITAMIN D, 25 HYDROXY   Result Value Ref Range    VITAMIN D, 25-HYDROXY 37.4 30.0 - 100.0 ng/mL    Narrative    Performed at:  [address]  [id number]  : Sophie Muniz MD, Phone:  [phone]   TSH 3RD GENERATION   Result Value Ref Range    TSH 0.745 0.450 - 4.500 uIU/mL    Narrative    Performed at:  [address]  [id number]  : Sophie Muniz MD, Phone:  [phone]   CVD REPORT   Result Value Ref Range    INTERPRETATION Note     Narrative    Performed at:  [address]  [id number]  : Dany Esparza MD, Phone:  [phone]             RECOMMENDATIONS:  Labs look great  Continue with current  medications.     Please call me if you have any questions: [phone]    Sincerely,      Sonia Cunha MD

## 2021-08-10 ENCOUNTER — DOCUMENTATION ONLY (OUTPATIENT)
Dept: FAMILY MEDICINE CLINIC | Age: 71
End: 2021-08-10

## 2021-08-20 ENCOUNTER — TELEPHONE (OUTPATIENT)
Dept: FAMILY MEDICINE CLINIC | Age: 71
End: 2021-08-20

## 2021-09-24 DIAGNOSIS — I10 ESSENTIAL HYPERTENSION: ICD-10-CM

## 2021-09-27 DIAGNOSIS — E11.65 TYPE 2 DIABETES MELLITUS WITH HYPERGLYCEMIA, WITHOUT LONG-TERM CURRENT USE OF INSULIN (HCC): ICD-10-CM

## 2021-09-27 RX ORDER — METFORMIN HYDROCHLORIDE 1000 MG/1
TABLET ORAL
Qty: 180 TABLET | Refills: 2 | Status: SHIPPED | OUTPATIENT
Start: 2021-09-27 | End: 2021-11-02 | Stop reason: ALTCHOICE

## 2021-09-27 RX ORDER — BISOPROLOL FUMARATE AND HYDROCHLOROTHIAZIDE 10; 6.25 MG/1; MG/1
TABLET ORAL
Qty: 180 TABLET | Refills: 2 | Status: SHIPPED | OUTPATIENT
Start: 2021-09-27 | End: 2021-11-02 | Stop reason: SDUPTHER

## 2021-11-01 DIAGNOSIS — Z87.891 PERSONAL HISTORY OF TOBACCO USE, PRESENTING HAZARDS TO HEALTH: ICD-10-CM

## 2021-11-02 ENCOUNTER — OFFICE VISIT (OUTPATIENT)
Dept: FAMILY MEDICINE CLINIC | Age: 71
End: 2021-11-02
Payer: MEDICARE

## 2021-11-02 VITALS
DIASTOLIC BLOOD PRESSURE: 82 MMHG | SYSTOLIC BLOOD PRESSURE: 140 MMHG | HEIGHT: 66 IN | HEART RATE: 61 BPM | OXYGEN SATURATION: 98 % | WEIGHT: 155.6 LBS | RESPIRATION RATE: 16 BRPM | BODY MASS INDEX: 25.01 KG/M2

## 2021-11-02 DIAGNOSIS — Z00.00 MEDICARE ANNUAL WELLNESS VISIT, SUBSEQUENT: Primary | ICD-10-CM

## 2021-11-02 DIAGNOSIS — F17.200 SMOKING: ICD-10-CM

## 2021-11-02 DIAGNOSIS — D12.6 TUBULAR ADENOMA OF COLON: ICD-10-CM

## 2021-11-02 DIAGNOSIS — Z12.11 SCREENING FOR COLON CANCER: ICD-10-CM

## 2021-11-02 DIAGNOSIS — I10 ESSENTIAL HYPERTENSION: ICD-10-CM

## 2021-11-02 DIAGNOSIS — E11.65 TYPE 2 DIABETES MELLITUS WITH HYPERGLYCEMIA, WITHOUT LONG-TERM CURRENT USE OF INSULIN (HCC): ICD-10-CM

## 2021-11-02 DIAGNOSIS — Z23 ENCOUNTER FOR IMMUNIZATION: ICD-10-CM

## 2021-11-02 DIAGNOSIS — E78.5 HYPERLIPIDEMIA, UNSPECIFIED HYPERLIPIDEMIA TYPE: ICD-10-CM

## 2021-11-02 DIAGNOSIS — E55.9 VITAMIN D DEFICIENCY: ICD-10-CM

## 2021-11-02 DIAGNOSIS — L29.0 RECTAL ITCHING: ICD-10-CM

## 2021-11-02 LAB — HBA1C MFR BLD HPLC: 6.3 %

## 2021-11-02 PROCEDURE — G8427 DOCREV CUR MEDS BY ELIG CLIN: HCPCS | Performed by: INTERNAL MEDICINE

## 2021-11-02 PROCEDURE — G8510 SCR DEP NEG, NO PLAN REQD: HCPCS | Performed by: INTERNAL MEDICINE

## 2021-11-02 PROCEDURE — 2022F DILAT RTA XM EVC RTNOPTHY: CPT | Performed by: INTERNAL MEDICINE

## 2021-11-02 PROCEDURE — G8536 NO DOC ELDER MAL SCRN: HCPCS | Performed by: INTERNAL MEDICINE

## 2021-11-02 PROCEDURE — 90694 VACC AIIV4 NO PRSRV 0.5ML IM: CPT | Performed by: INTERNAL MEDICINE

## 2021-11-02 PROCEDURE — 1101F PT FALLS ASSESS-DOCD LE1/YR: CPT | Performed by: INTERNAL MEDICINE

## 2021-11-02 PROCEDURE — G0439 PPPS, SUBSEQ VISIT: HCPCS | Performed by: INTERNAL MEDICINE

## 2021-11-02 PROCEDURE — 3044F HG A1C LEVEL LT 7.0%: CPT | Performed by: INTERNAL MEDICINE

## 2021-11-02 PROCEDURE — G0008 ADMIN INFLUENZA VIRUS VAC: HCPCS | Performed by: INTERNAL MEDICINE

## 2021-11-02 PROCEDURE — 83036 HEMOGLOBIN GLYCOSYLATED A1C: CPT | Performed by: INTERNAL MEDICINE

## 2021-11-02 PROCEDURE — G8754 DIAS BP LESS 90: HCPCS | Performed by: INTERNAL MEDICINE

## 2021-11-02 PROCEDURE — 3017F COLORECTAL CA SCREEN DOC REV: CPT | Performed by: INTERNAL MEDICINE

## 2021-11-02 PROCEDURE — G8419 CALC BMI OUT NRM PARAM NOF/U: HCPCS | Performed by: INTERNAL MEDICINE

## 2021-11-02 PROCEDURE — G8753 SYS BP > OR = 140: HCPCS | Performed by: INTERNAL MEDICINE

## 2021-11-02 PROCEDURE — 99214 OFFICE O/P EST MOD 30 MIN: CPT | Performed by: INTERNAL MEDICINE

## 2021-11-02 RX ORDER — HYDROCORTISONE ACETATE PRAMOXINE HCL 1; 1 G/100G; G/100G
CREAM TOPICAL 2 TIMES DAILY
Qty: 30 G | Refills: 1 | Status: SHIPPED | OUTPATIENT
Start: 2021-11-02

## 2021-11-02 RX ORDER — BISOPROLOL FUMARATE AND HYDROCHLOROTHIAZIDE 10; 6.25 MG/1; MG/1
TABLET ORAL
Qty: 180 TABLET | Refills: 2 | Status: SHIPPED | OUTPATIENT
Start: 2021-11-02 | End: 2022-03-03 | Stop reason: SDUPTHER

## 2021-11-02 RX ORDER — ATORVASTATIN CALCIUM 10 MG/1
TABLET, FILM COATED ORAL
Qty: 90 TABLET | Refills: 2 | Status: SHIPPED | OUTPATIENT
Start: 2021-11-02 | End: 2022-03-03 | Stop reason: SDUPTHER

## 2021-11-02 RX ORDER — ERGOCALCIFEROL 1.25 MG/1
50000 CAPSULE ORAL
Qty: 12 CAPSULE | Refills: 1 | Status: SHIPPED | OUTPATIENT
Start: 2021-11-02 | End: 2022-03-03 | Stop reason: SDUPTHER

## 2021-11-02 RX ORDER — METFORMIN HYDROCHLORIDE 500 MG/1
TABLET ORAL
Qty: 360 TABLET | Refills: 1 | Status: SHIPPED | OUTPATIENT
Start: 2021-11-02 | End: 2022-03-03 | Stop reason: SDUPTHER

## 2021-11-02 RX ORDER — METFORMIN HYDROCHLORIDE 1000 MG/1
TABLET ORAL
Qty: 180 TABLET | Refills: 2 | Status: CANCELLED | OUTPATIENT
Start: 2021-11-02

## 2021-11-02 RX ORDER — AMLODIPINE BESYLATE 10 MG/1
10 TABLET ORAL DAILY
Qty: 90 TABLET | Refills: 2 | Status: SHIPPED | OUTPATIENT
Start: 2021-11-02 | End: 2022-03-03 | Stop reason: SDUPTHER

## 2021-11-02 NOTE — PATIENT INSTRUCTIONS
Medicare Wellness Visit, Male    The best way to live healthy is to have a lifestyle where you eat a well-balanced diet, exercise regularly, limit alcohol use, and quit all forms of tobacco/nicotine, if applicable. Regular preventive services are another way to keep healthy. Preventive services (vaccines, screening tests, monitoring & exams) can help personalize your care plan, which helps you manage your own care. Screening tests can find health problems at the earliest stages, when they are easiest to treat. [facility] follows the current, evidence-based guidelines published by the [facility] ([facility]) when recommending preventive services for our patients. Because we follow these guidelines, sometimes recommendations change over time as research supports it. (For example, a prostate screening blood test is no longer routinely recommended for men with no symptoms). Of course, you and your doctor may decide to screen more often for some diseases, based on your risk and co-morbidities (chronic disease you are already diagnosed with). Preventive services for you include:  - Medicare offers their members a free annual wellness visit, which is time for you and your primary care provider to discuss and plan for your preventive service needs. Take advantage of this benefit every year!  -All adults over age 72 should receive the recommended pneumonia vaccines. Current USPSTF guidelines recommend a series of two vaccines for the best pneumonia protection.   -All adults should have a flu vaccine yearly and tetanus vaccine every 10 years.  -All adults age 48 and older should receive the shingles vaccines (series of two vaccines).        -All adults age 38-68 who are overweight should have a diabetes screening test once every three years.   -Other screening tests & preventive services for persons with diabetes include: an eye exam to screen for diabetic retinopathy, a kidney function test, a foot exam, and stricter control over your cholesterol.   -Cardiovascular screening for adults with routine risk involves an electrocardiogram (ECG) at intervals determined by the provider.   -Colorectal cancer screening should be done for adults age 54-65 with no increased risk factors for colorectal cancer. There are a number of acceptable methods of screening for this type of cancer. Each test has its own benefits and drawbacks. Discuss with your provider what is most appropriate for you during your annual wellness visit. The different tests include: colonoscopy (considered the best screening method), a fecal occult blood test, a fecal DNA test, and sigmoidoscopy.  -All adults born between [facility] should be screened once for Hepatitis C.  -An Abdominal Aortic Aneurysm (AAA) Screening is recommended for men age 73-68 who has ever smoked in their lifetime.      Here is a list of your current Health Maintenance items (your personalized list of preventive services) with a due date:  Health Maintenance Due   Topic Date Due    Shingles Vaccine (1 of 2) Never done    Colorectal Screening  12/26/2019

## 2021-11-02 NOTE — PROGRESS NOTES
Patient seen for annual medicare wellness visit without concerns. 1. Have you been to the ER, urgent care clinic since your last visit? Hospitalized since your last visit? No    2. Have you seen or consulted any other health care providers outside of the [address] [place] [address] since your last visit? Include any pap smears or colon screening. No     Health Maintenance Due   Topic Date Due    Shingrix Vaccine Age 49> (1 of 2) Never done    Colorectal Cancer Screening Combo  12/26/2019    Medicare Yearly Exam  10/14/2021     Patient was given VIS for review,consent was obtained and per orders of Dr. Socorro Nicholas, injection [facility] Flu[facility]. Patient observed. No signs nor symptoms of any adverse reactions. Patient tolerated injection well.

## 2021-11-02 NOTE — PROGRESS NOTES
Assessment/ Plan:   Diagnoses and all orders for this visit:    1. Medicare annual wellness visit, subsequent-see note below    2. Type 2 diabetes mellitus with hyperglycemia, without long-term current use of insulin (HCC)-A1C at goal on higher dose of Metformin  -     AMB POC HEMOGLOBIN A1C  -     atorvastatin (LIPITOR) 10 mg tablet; TAKE 1 TABLET EVERY DAY  -     metFORMIN (GLUCOPHAGE) 500 mg tablet; 2 po BID with meals    3. Essential hypertension-borderline; advised low sodium diet and home monitoring  -     bisoprolol-hydroCHLOROthiazide (ZIAC) 10-6.25 mg per tablet; TAKE 2 TABLETS EVERY DAY  -     amLODIPine (NORVASC) 10 mg tablet; Take 1 Tablet by mouth daily. 4. Hyperlipidemia, unspecified hyperlipidemia type-goal LDL of less than 70 on Lipitor  -     atorvastatin (LIPITOR) 10 mg tablet; TAKE 1 TABLET EVERY DAY    5. Vitamin D deficiency-check level next visit  -     ergocalciferol (ERGOCALCIFEROL) 1,250 mcg (50,000 unit) capsule; Take 1 Capsule by mouth every seven (7) days. 6. Rectal itching  -     pramoxine-hydrocortisone (ANALPRAM HC 1%) 1-1 % rectal cream; Insert  into rectum two (2) times a day. 7. Smoking-smoking cessation advised; yearly low dose lung CT     8. Hx of tubular adenoma-needs follow up colonoscopy      Follow-up and Dispositions    · Return in about 3 months (around 2/2/2022) for follow up.                  Chief Complaint   Patient presents with    Annual Wellness Visit    Follow Up Chronic Condition       Pt is a 70y.o. year old male who presents for follow up of his chronic medical problems    Health Maintenance Due   Topic Date Due    Shingrix Vaccine Age 49> (1 of 2) Never done    Colorectal Cancer Screening Combo-advised to ask downstairs when  12/26/2019    Medicare Yearly Exam -today 10/14/2021        BP Readings from Last 3 Encounters:   11/02/21 (!) 140/82   08/02/21 137/72   01/14/21 (!) 140/68   Repeat BP-  Home BPs-does not check  Took meds already    [employer] Readings from Last 3 Encounters:   11/02/21 155 lb 9.6 oz (70.6 kg)   08/02/21 161 lb 12.8 oz (73.4 kg)   01/21/20 171 lb (77.6 kg)       Lab Results   Component Value Date/Time    VITAMIN D, 25-HYDROXY 37.4 08/02/2021 02:24 PM     On rx    Lab Results   Component Value Date/Time    TSH 0.745 08/02/2021 02:24 PM       Lab Results   Component Value Date/Time    Cholesterol, total 141 08/02/2021 02:24 PM    HDL Cholesterol 42 08/02/2021 02:24 PM    LDL, calculated 76 08/02/2021 02:24 PM    LDL, calculated 125 (H) 10/16/2019 01:37 AM    VLDL, calculated 23 08/02/2021 02:24 PM    VLDL, calculated 26 10/16/2019 01:37 AM    Triglyceride 127 08/02/2021 02:24 PM    CHOL/HDL Ratio 4.1 07/20/2010 09:50 AM       Lab Results   Component Value Date/Time    Hemoglobin A1c 6.8 (H) 01/14/2021 10:19 AM    Hemoglobin A1c (POC) 6.3 11/02/2021 11:54 AM   on higher dose of Metformin-does not like the big pill/gags  Would be ok to taking 2 500 mg BID    Still smoking -low dose CT lung neg  Never took Chantix    Rectal itching-?hemorrhoid; will send cream  ROS:    Pt denies: Wt loss, Fever/Chills, HA, Visual changes, Fatigue, Chest pain, SOB, SALMON, Abd pain, N/V/D/C, Blood in stool or urine, Edema. Pertinent positive as above in HPI. All others were negative    Patient Active Problem List   Diagnosis Code    Smoking F17.200    Essential hypertension I10    Subclinical hyperthyroidism E05.90    Type 2 diabetes mellitus with hyperglycemia, without long-term current use of insulin (HCC) E11.65    Primary osteoarthritis of left knee M17.12    Overweight (BMI 25.0-29. 9) E66.3    Advanced directives, counseling/discussion Z71.89    Tubular adenoma of colon D12.6    Hyperlipidemia E78.5       Past Medical History:   Diagnosis Date    Hypertension        Current Outpatient Medications   Medication Sig Dispense Refill    bisoprolol-hydroCHLOROthiazide (ZIAC) 10-6.25 mg per tablet TAKE 2 TABLETS EVERY  Tablet 2    amLODIPine (NORVASC) 10 mg tablet Take 1 Tablet by mouth daily. 90 Tablet 2    atorvastatin (LIPITOR) 10 mg tablet TAKE 1 TABLET EVERY DAY 90 Tablet 2    ergocalciferol (ERGOCALCIFEROL) 1,250 mcg (50,000 unit) capsule Take 1 Capsule by mouth every seven (7) days. 12 Capsule 1    metFORMIN (GLUCOPHAGE) 500 mg tablet 2 po BID with meals 360 Tablet 1    pramoxine-hydrocortisone (ANALPRAM HC 1%) 1-1 % rectal cream Insert  into rectum two (2) times a day. 30 g 1       Social History     Tobacco Use   Smoking Status Light Tobacco Smoker   Smokeless Tobacco Never Used       No Known Allergies    Patient Labs were reviewed: yes    Patient Past Records were reviewed: yes      Objective:     Vitals:    11/02/21 1152 11/02/21 1200   BP: (!) 148/70 (!) 140/82   Pulse: 67 61   Resp: 16    SpO2: 98%    Weight: 155 lb 9.6 oz (70.6 kg)    Height: 5' 6\" (1.676 m)      Body mass index is 25.11 kg/m². Exam:   Appearance: alert, well appearing,  oriented to person, place, and time, acyanotic, in no respiratory distress and well hydrated. HEENT:  NC/AT, pink conj, anicteric sclerae  Neck:  No cervical lymphadenopathy, no JVD, no thyromegaly, no carotid bruit  Heart:  RRR without M/R/G  Lungs:  CTAB, no rhonchi, rales, or wheezes with good air exchange   Abdomen:  Non-tender, pos bowel sounds, no hepatosplenomegaly  Ext:  No C/C/E    Skin: no rash  Neuro: no lateralizing signs, CNs II-XII intact            I have discussed the diagnosis with the patient and the intended plan as seen in the above orders. The patient has received an After-Visit Summary and questions were answered concerning future plans. Medication Side Effects and Warnings were discussed with patient: yes    Patient verbalized understanding of above instructions.     Jh Kruger MD  Internal Medicine  [facility]    This is the Subsequent Medicare Annual Wellness Exam, performed 12 months or more after the Initial AWV or the last Subsequent JODY    I have reviewed the patient's medical history in detail and updated the computerized patient record. Assessment/Plan   Education and counseling provided:  Are appropriate based on today's review and evaluation  End-of-Life planning (with patient's consent)-primary decision maker verified  Pneumococcal Vaccine-done  Influenza Vaccine-today  Prostate cancer screening tests (PSA, covered annually)-will do next labs  Lab Results   Component Value Date/Time    Prostate Specific Ag 1.6 10/14/2020 01:08 AM    Prostate Specific Ag 1.3 04/10/2019 02:48 AM    Prostate Specific Ag 0.6 07/20/2010 09:50 AM     Colorectal cancer screening tests-advised he needs to schedule this  Cardiovascular screening blood test-lipids up to date  Screening for glaucoma-eye exam is up to date    1. Medicare annual wellness visit, subsequent-Refer to above for plan and to patient instructions for recommendations on HM  Will refer for follow up colonoscopy    2. Encounter for immunization  -     FLU (FLUAD QUAD INFLUENZA VACCINE,QUAD,ADJUVANTED)  -     ADMIN INFLUENZA VIRUS VAC     RTC yearly for wellness visit    Depression Risk Factor Screening     3 most recent PHQ Screens 11/2/2021   Little interest or pleasure in doing things Not at all   Feeling down, depressed, irritable, or hopeless Not at all   Total Score PHQ 2 0       Alcohol Risk Screen    Do you average more than 1 drink per night or more than 7 drinks a week: No    In the past three months have you have had more than 4 drinks containing alcohol on one occasion: No    Occasional drink    Functional Ability and Level of Safety    Hearing: The patient wears hearing aids. but does not wear it all the time      Activities of Daily Living: The home contains: no safety equipment. Patient does total self care      Ambulation: with no difficulty     Fall Risk:  Fall Risk Assessment, last 12 mths 11/2/2021   Able to walk? Yes   Fall in past 12 months? 0   Do you feel unsteady? 0   Are you worried about falling 0      Abuse Screen:  Patient is not abused       Cognitive Screening    Has your family/caregiver stated any concerns about your memory: no     Cognitive Screening: Normal - Clock Drawing Test    Health Maintenance Due     Health Maintenance Due   Topic Date Due    Shingrix Vaccine Age 49> (1 of 2) Never done    Colorectal Cancer Screening Combo  12/26/2019    COVID-19 Vaccine (3 - Booster for Verlena Stormy series) 09/18/2021       Patient Care Team   Patient Care Team:  Syd Irwin MD as PCP - General (Internal Medicine)  Syd Irwin MD as PCP [facility] Empaneled Provider    History     Patient Active Problem List   Diagnosis Code    Smoking F17.200    Essential hypertension I10    Subclinical hyperthyroidism E05.90    Type 2 diabetes mellitus with hyperglycemia, without long-term current use of insulin ([facility] [address] E11.65    Primary osteoarthritis of left knee M17.12    Overweight (BMI 25.0-29. 9) E66.3    Advanced directives, counseling/discussion Z71.89    Tubular adenoma of colon D12.6    Hyperlipidemia E78.5     Past Medical History:   Diagnosis Date    Hypertension       History reviewed. No pertinent surgical history. Current Outpatient Medications   Medication Sig Dispense Refill    bisoprolol-hydroCHLOROthiazide (ZIAC) 10-6.25 mg per tablet TAKE 2 TABLETS EVERY  Tablet 2    amLODIPine (NORVASC) 10 mg tablet Take 1 Tablet by mouth daily. 90 Tablet 2    atorvastatin (LIPITOR) 10 mg tablet TAKE 1 TABLET EVERY DAY 90 Tablet 2    ergocalciferol (ERGOCALCIFEROL) 1,250 mcg (50,000 unit) capsule Take 1 Capsule by mouth every seven (7) days. 12 Capsule 1    metFORMIN (GLUCOPHAGE) 500 mg tablet 2 po BID with meals 360 Tablet 1    pramoxine-hydrocortisone (ANALPRAM HC 1%) 1-1 % rectal cream Insert  into rectum two (2) times a day.  30 g 1     No Known Allergies    Family History   Problem Relation Age of Onset    Hypertension Mother     Stroke Mother  Hypertension Father     Stroke Father     Diabetes Brother      Social History     Tobacco Use    Smoking status: Light Tobacco Smoker    Smokeless tobacco: Never Used   Substance Use Topics    Alcohol use: Yes     Comment: social Ankush Mckeon MD

## 2022-01-06 NOTE — PROGRESS NOTES
pls let patient know A1c is normal, continue with same dose of Metformin  Thyroid test normal  Cholesterol levels still pending details…

## 2022-03-03 ENCOUNTER — HOSPITAL ENCOUNTER (OUTPATIENT)
Dept: LAB | Age: 72
Discharge: HOME | End: 2022-03-03
Payer: MEDICARE

## 2022-03-03 ENCOUNTER — OFFICE VISIT (OUTPATIENT)
Dept: FAMILY MEDICINE CLINIC | Age: 72
End: 2022-03-03
Payer: MEDICARE

## 2022-03-03 VITALS
TEMPERATURE: 98.4 F | RESPIRATION RATE: 16 BRPM | DIASTOLIC BLOOD PRESSURE: 70 MMHG | WEIGHT: 160.5 LBS | HEART RATE: 60 BPM | HEIGHT: 66 IN | BODY MASS INDEX: 25.79 KG/M2 | OXYGEN SATURATION: 99 % | SYSTOLIC BLOOD PRESSURE: 136 MMHG

## 2022-03-03 DIAGNOSIS — E55.9 VITAMIN D DEFICIENCY: ICD-10-CM

## 2022-03-03 DIAGNOSIS — I10 ESSENTIAL HYPERTENSION: ICD-10-CM

## 2022-03-03 DIAGNOSIS — E05.90 SUBCLINICAL HYPERTHYROIDISM: ICD-10-CM

## 2022-03-03 DIAGNOSIS — E11.65 TYPE 2 DIABETES MELLITUS WITH HYPERGLYCEMIA, WITHOUT LONG-TERM CURRENT USE OF INSULIN (HCC): Primary | ICD-10-CM

## 2022-03-03 DIAGNOSIS — E78.5 HYPERLIPIDEMIA, UNSPECIFIED HYPERLIPIDEMIA TYPE: ICD-10-CM

## 2022-03-03 DIAGNOSIS — D12.6 TUBULAR ADENOMA OF COLON: ICD-10-CM

## 2022-03-03 LAB
25(OH)D3 SERPL-MCNC: 37.6 NG/ML (ref 30–100)
ALBUMIN SERPL-MCNC: 3.9 G/DL (ref 3.4–5)
ALBUMIN/GLOB SERPL: 1 {RATIO} (ref 0.8–1.7)
ALP SERPL-CCNC: 73 U/L (ref 45–117)
ALT SERPL-CCNC: 39 U/L (ref 16–61)
ANION GAP SERPL CALC-SCNC: 6 MMOL/L (ref 3–18)
AST SERPL-CCNC: 44 U/L (ref 10–38)
BASOPHILS # BLD: 0.1 K/UL (ref 0–0.1)
BASOPHILS NFR BLD: 1 % (ref 0–2)
BILIRUB SERPL-MCNC: 0.9 MG/DL (ref 0.2–1)
BUN SERPL-MCNC: 15 MG/DL (ref 7–18)
BUN/CREAT SERPL: 12 (ref 12–20)
CALCIUM SERPL-MCNC: 9 MG/DL (ref 8.5–10.1)
CHLORIDE SERPL-SCNC: 106 MMOL/L (ref 100–111)
CHOLEST SERPL-MCNC: 124 MG/DL
CO2 SERPL-SCNC: 25 MMOL/L (ref 21–32)
CREAT SERPL-MCNC: 1.29 MG/DL (ref 0.6–1.3)
DIFFERENTIAL METHOD BLD: ABNORMAL
EOSINOPHIL # BLD: 0.1 K/UL (ref 0–0.4)
EOSINOPHIL NFR BLD: 1 % (ref 0–5)
ERYTHROCYTE [DISTWIDTH] IN BLOOD BY AUTOMATED COUNT: 14 % (ref 11.6–14.5)
GLOBULIN SER CALC-MCNC: 4 G/DL (ref 2–4)
GLUCOSE SERPL-MCNC: 124 MG/DL (ref 74–99)
HBA1C MFR BLD HPLC: 7.6 %
HCT VFR BLD AUTO: 46.7 % (ref 36–48)
HDLC SERPL-MCNC: 43 MG/DL (ref 40–60)
HDLC SERPL: 2.9 {RATIO} (ref 0–5)
HGB BLD-MCNC: 15.4 G/DL (ref 13–16)
IMM GRANULOCYTES # BLD AUTO: 0 K/UL (ref 0–0.04)
IMM GRANULOCYTES NFR BLD AUTO: 0 % (ref 0–0.5)
LDLC SERPL CALC-MCNC: 60.2 MG/DL (ref 0–100)
LIPID PROFILE,FLP: NORMAL
LYMPHOCYTES # BLD: 2.1 K/UL (ref 0.9–3.6)
LYMPHOCYTES NFR BLD: 26 % (ref 21–52)
MCH RBC QN AUTO: 31.6 PG (ref 24–34)
MCHC RBC AUTO-ENTMCNC: 33 G/DL (ref 31–37)
MCV RBC AUTO: 95.9 FL (ref 78–100)
MONOCYTES # BLD: 0.5 K/UL (ref 0.05–1.2)
MONOCYTES NFR BLD: 7 % (ref 3–10)
NEUTS SEG # BLD: 5.2 K/UL (ref 1.8–8)
NEUTS SEG NFR BLD: 65 % (ref 40–73)
NRBC # BLD: 0 K/UL (ref 0–0.01)
NRBC BLD-RTO: 0 PER 100 WBC
PLATELET # BLD AUTO: 31 K/UL (ref 135–420)
PMV BLD AUTO: 12.7 FL (ref 9.2–11.8)
POTASSIUM SERPL-SCNC: 5.3 MMOL/L (ref 3.5–5.5)
PROT SERPL-MCNC: 7.9 G/DL (ref 6.4–8.2)
RBC # BLD AUTO: 4.87 M/UL (ref 4.35–5.65)
SODIUM SERPL-SCNC: 137 MMOL/L (ref 136–145)
TRIGL SERPL-MCNC: 104 MG/DL (ref ?–150)
TSH SERPL DL<=0.05 MIU/L-ACNC: 1.13 UIU/ML (ref 0.36–3.74)
VLDLC SERPL CALC-MCNC: 20.8 MG/DL
WBC # BLD AUTO: 8 K/UL (ref 4.6–13.2)

## 2022-03-03 PROCEDURE — 84443 ASSAY THYROID STIM HORMONE: CPT

## 2022-03-03 PROCEDURE — 85025 COMPLETE CBC W/AUTO DIFF WBC: CPT

## 2022-03-03 PROCEDURE — 1101F PT FALLS ASSESS-DOCD LE1/YR: CPT | Performed by: INTERNAL MEDICINE

## 2022-03-03 PROCEDURE — G8752 SYS BP LESS 140: HCPCS | Performed by: INTERNAL MEDICINE

## 2022-03-03 PROCEDURE — 83036 HEMOGLOBIN GLYCOSYLATED A1C: CPT | Performed by: INTERNAL MEDICINE

## 2022-03-03 PROCEDURE — G8536 NO DOC ELDER MAL SCRN: HCPCS | Performed by: INTERNAL MEDICINE

## 2022-03-03 PROCEDURE — 80061 LIPID PANEL: CPT

## 2022-03-03 PROCEDURE — 99214 OFFICE O/P EST MOD 30 MIN: CPT | Performed by: INTERNAL MEDICINE

## 2022-03-03 PROCEDURE — G8427 DOCREV CUR MEDS BY ELIG CLIN: HCPCS | Performed by: INTERNAL MEDICINE

## 2022-03-03 PROCEDURE — 80053 COMPREHEN METABOLIC PANEL: CPT

## 2022-03-03 PROCEDURE — G8510 SCR DEP NEG, NO PLAN REQD: HCPCS | Performed by: INTERNAL MEDICINE

## 2022-03-03 PROCEDURE — G8419 CALC BMI OUT NRM PARAM NOF/U: HCPCS | Performed by: INTERNAL MEDICINE

## 2022-03-03 PROCEDURE — 3051F HG A1C>EQUAL 7.0%<8.0%: CPT | Performed by: INTERNAL MEDICINE

## 2022-03-03 PROCEDURE — 36415 COLL VENOUS BLD VENIPUNCTURE: CPT

## 2022-03-03 PROCEDURE — G8754 DIAS BP LESS 90: HCPCS | Performed by: INTERNAL MEDICINE

## 2022-03-03 PROCEDURE — 82306 VITAMIN D 25 HYDROXY: CPT

## 2022-03-03 PROCEDURE — 2022F DILAT RTA XM EVC RTNOPTHY: CPT | Performed by: INTERNAL MEDICINE

## 2022-03-03 PROCEDURE — 3017F COLORECTAL CA SCREEN DOC REV: CPT | Performed by: INTERNAL MEDICINE

## 2022-03-03 RX ORDER — BISOPROLOL FUMARATE AND HYDROCHLOROTHIAZIDE 10; 6.25 MG/1; MG/1
TABLET ORAL
Qty: 180 TABLET | Refills: 2 | Status: SHIPPED | OUTPATIENT
Start: 2022-03-03 | End: 2022-04-08 | Stop reason: SDUPTHER

## 2022-03-03 RX ORDER — METFORMIN HYDROCHLORIDE 500 MG/1
TABLET ORAL
Qty: 360 TABLET | Refills: 1 | Status: SHIPPED | OUTPATIENT
Start: 2022-03-03 | End: 2022-04-08 | Stop reason: SDUPTHER

## 2022-03-03 RX ORDER — AMLODIPINE BESYLATE 10 MG/1
10 TABLET ORAL DAILY
Qty: 90 TABLET | Refills: 2 | Status: SHIPPED | OUTPATIENT
Start: 2022-03-03 | End: 2022-04-08 | Stop reason: SDUPTHER

## 2022-03-03 RX ORDER — ERGOCALCIFEROL 1.25 MG/1
50000 CAPSULE ORAL
Qty: 12 CAPSULE | Refills: 1 | Status: SHIPPED | OUTPATIENT
Start: 2022-03-03 | End: 2022-04-08 | Stop reason: SDUPTHER

## 2022-03-03 RX ORDER — ATORVASTATIN CALCIUM 10 MG/1
TABLET, FILM COATED ORAL
Qty: 90 TABLET | Refills: 2 | Status: SHIPPED | OUTPATIENT
Start: 2022-03-03 | End: 2022-04-08 | Stop reason: SDUPTHER

## 2022-03-03 NOTE — PROGRESS NOTES
Patient seen for routine follow up     1. \"Have you been to the ER, urgent care clinic since your last visit? Hospitalized since your last visit? \" No    2. \"Have you seen or consulted any other health care providers outside of the [address] [place] [address] since your last visit? \" No     3. For patients aged 39-70: Has the patient had a colonoscopy / FIT/ Cologuard? Yes - Care Gap present. Most recent result on file      If the patient is female:    4. For patients aged 41-77: Has the patient had a mammogram within the past 2 years? NA - based on age or sex      11. For patients aged 21-65: Has the patient had a pap smear?  NA - based on age or sex        Health Maintenance Due   Topic Date Due    Shingrix Vaccine Age 49> (1 of 2) Never done    Colorectal Cancer Screening Combo  12/26/2019

## 2022-03-03 NOTE — PROGRESS NOTES
Assessment/ Plan:   Diagnoses and all orders for this visit:    1. Type 2 diabetes mellitus with hyperglycemia, without long-term current use of insulin (HCC)-A1c not at goal on current meds but would like to work on diet first before another med   -     AMB POC HEMOGLOBIN A1C  -     atorvastatin (LIPITOR) 10 mg tablet; TAKE 1 TABLET EVERY DAY  -     metFORMIN (GLUCOPHAGE) 500 mg tablet; 2 po BID with meals    2. Essential hypertension-controlled, continue with present meds  -     bisoprolol-hydroCHLOROthiazide (ZIAC) 10-6.25 mg per tablet; TAKE 2 TABLETS EVERY DAY  -     amLODIPine (NORVASC) 10 mg tablet; Take 1 Tablet by mouth daily.  -     CBC WITH AUTOMATED DIFF; Future  -     METABOLIC PANEL, COMPREHENSIVE; Future    3. Hyperlipidemia, unspecified hyperlipidemia type-goal LDL of less than 70 on Lipitor  -     atorvastatin (LIPITOR) 10 mg tablet; TAKE 1 TABLET EVERY DAY  -     LIPID PANEL; Future    4. Vitamin D deficiency-on rx  -     ergocalciferol (ERGOCALCIFEROL) 1,250 mcg (50,000 unit) capsule; Take 1 Capsule by mouth every seven (7) days. -     VITAMIN D, 25 HYDROXY; Future    5. Tubular adenoma of colon -follow up colonoscopy already scheduled    6. Subclinical hyperthyroidism  -     TSH 3RD GENERATION; Future        Follow-up and Dispositions    · Return in about 6 months (around 9/3/2022).                    Chief Complaint   Patient presents with    Follow Up Chronic Condition       Pt is a 70y.o. year old male who presents for follow up of his chronic medical problems    Health Maintenance Due   Topic Date Due    Shingrix Vaccine Age 49> (1 of 2) Never done    Colorectal Cancer Screening Combo -scheduled with Dr Kristin Merlin 12/26/2019      BP Readings from Last 3 Encounters:   03/03/22 136/70   11/02/21 (!) 140/82   08/02/21 137/72   repeat BP-better    Lab Results   Component Value Date/Time    Hemoglobin A1c 6.8 (H) 01/14/2021 10:19 AM    Hemoglobin A1c (POC) 7.6 03/03/2022 01:35 PM       Lab Results   Component Value Date/Time    Vitamin D 25-Hydroxy 37.6 03/03/2022 02:34 PM     On rx    Lab Results   Component Value Date/Time    Cholesterol, total 124 03/03/2022 02:34 PM    HDL Cholesterol 43 03/03/2022 02:34 PM    LDL, calculated 60.2 03/03/2022 02:34 PM    VLDL, calculated 20.8 03/03/2022 02:34 PM    Triglyceride 104 03/03/2022 02:34 PM    CHOL/HDL Ratio 2.9 03/03/2022 02:34 PM     ROS:    Pt denies: Wt loss, Fever/Chills, HA, Visual changes, Fatigue, Chest pain, SOB, SALMON, Abd pain, N/V/D/C, Blood in stool or urine, Edema. Pertinent positive as above in HPI. All others were negative    Patient Active Problem List   Diagnosis Code    Smoking F17.200    Essential hypertension I10    Subclinical hyperthyroidism E05.90    Type 2 diabetes mellitus with hyperglycemia, without long-term current use of insulin (HCC) E11.65    Primary osteoarthritis of left knee M17.12    Overweight (BMI 25.0-29. 9) E66.3    Advanced directives, counseling/discussion Z71.89    Tubular adenoma of colon D12.6    Hyperlipidemia E78.5       Past Medical History:   Diagnosis Date    Hypertension        Current Outpatient Medications   Medication Sig Dispense Refill    bisoprolol-hydroCHLOROthiazide (ZIAC) 10-6.25 mg per tablet TAKE 2 TABLETS EVERY  Tablet 2    amLODIPine (NORVASC) 10 mg tablet Take 1 Tablet by mouth daily. 90 Tablet 2    atorvastatin (LIPITOR) 10 mg tablet TAKE 1 TABLET EVERY DAY 90 Tablet 2    ergocalciferol (ERGOCALCIFEROL) 1,250 mcg (50,000 unit) capsule Take 1 Capsule by mouth every seven (7) days. 12 Capsule 1    metFORMIN (GLUCOPHAGE) 500 mg tablet 2 po BID with meals 360 Tablet 1    pramoxine-hydrocortisone (ANALPRAM HC 1%) 1-1 % rectal cream Insert  into rectum two (2) times a day.  30 g 1       Social History     Tobacco Use   Smoking Status Light Tobacco Smoker   Smokeless Tobacco Never Used       No Known Allergies    Patient Labs were reviewed: yes    Patient Past Records were reviewed: yes      Objective:     Vitals:    03/03/22 1333 03/03/22 1342 03/03/22 1428   BP: (!) 141/71 (!) 148/78 136/70   Pulse: 63 60    Resp: 16     Temp: 98.4 °F (36.9 °C)     TempSrc: Temporal     SpO2: 99%     Weight: 160 lb 8 oz (72.8 kg)     Height: 5' 6\" (1.676 m)       Body mass index is 25.91 kg/m². Exam:   Appearance: alert, well appearing,  oriented to person, place, and time, acyanotic, in no respiratory distress and well hydrated. HEENT:  NC/AT, pink conj, anicteric sclerae  Neck:  No cervical lymphadenopathy, no JVD, no thyromegaly, no carotid bruit  Heart:  RRR without M/R/G  Lungs:  CTAB, no rhonchi, rales, or wheezes with good air exchange   Abdomen:  Non-tender, pos bowel sounds, no hepatosplenomegaly  Ext:  No C/C/E    Skin: no rash  Neuro: no lateralizing signs, CNs II-XII intact            I have discussed the diagnosis with the patient and the intended plan as seen in the above orders. The patient has received an After-Visit Summary and questions were answered concerning future plans. Medication Side Effects and Warnings were discussed with patient: yes    Patient verbalized understanding of above instructions.     Maggie Gabriel MD  Internal Medicine  [facility]

## 2022-03-04 NOTE — PROGRESS NOTES
Pls let patient know-labs normal/stable except for slightly decreased kidney fucntion  Kidney function is slightly decreased so avoid over the counter medications like Motrin, Aleve, Advil, Naprosyn; recheck next visit

## 2022-03-18 PROBLEM — D12.6 TUBULAR ADENOMA OF COLON: Status: ACTIVE | Noted: 2020-10-13

## 2022-03-18 PROBLEM — E66.3 OVERWEIGHT (BMI 25.0-29.9): Status: ACTIVE | Noted: 2018-02-20

## 2022-03-19 PROBLEM — E78.5 HYPERLIPIDEMIA: Status: ACTIVE | Noted: 2021-08-02

## 2022-03-19 PROBLEM — Z71.89 ADVANCED DIRECTIVES, COUNSELING/DISCUSSION: Status: ACTIVE | Noted: 2018-05-21

## 2022-06-02 ENCOUNTER — PATIENT OUTREACH (OUTPATIENT)
Dept: CASE MANAGEMENT | Age: 72
End: 2022-06-02

## 2022-06-09 ENCOUNTER — PATIENT OUTREACH (OUTPATIENT)
Dept: CASE MANAGEMENT | Age: 72
End: 2022-06-09

## 2022-06-09 NOTE — PROGRESS NOTES
.Date/Time:  6/9/2022 4:23 PM    Method of communication with patient:phone    [facility] ( [facility]) made second out reach to  patient by telephone to offer Complex Case Management  ( CCM). Provided introduction to self, and explanation of the Ambulatory Care . Contact at [phone] anytime Monday thru Friday 08:00-4:30.

## 2022-06-15 ENCOUNTER — PATIENT OUTREACH (OUTPATIENT)
Dept: CASE MANAGEMENT | Age: 72
End: 2022-06-15

## 2022-06-15 NOTE — PROGRESS NOTES
.Date/Time:  6/15/2022 9:24 AM    Method of communication with patient:phone    [facility] ( [facility]) made third out reach to  patient by telephone to offer Complex Case Management  ( CCM). Provided introduction to self, and explanation of the Ambulatory Care . Contact at [phone] anytime Monday thru Friday 08:00-4:30. Patient will not receive any further CCM calls for 90 days due to not being able to make contact.

## 2022-08-16 ENCOUNTER — HOSPITAL ENCOUNTER (OUTPATIENT)
Dept: LAB | Age: 72
Discharge: HOME | End: 2022-08-16
Payer: MEDICARE

## 2022-08-16 DIAGNOSIS — E55.9 VITAMIN D DEFICIENCY: Primary | ICD-10-CM

## 2022-08-16 DIAGNOSIS — I10 ESSENTIAL HYPERTENSION: ICD-10-CM

## 2022-08-16 DIAGNOSIS — E11.65 TYPE 2 DIABETES MELLITUS WITH HYPERGLYCEMIA, WITHOUT LONG-TERM CURRENT USE OF INSULIN (HCC): ICD-10-CM

## 2022-08-16 DIAGNOSIS — E05.90 SUBCLINICAL HYPERTHYROIDISM: ICD-10-CM

## 2022-08-16 DIAGNOSIS — E78.5 HYPERLIPIDEMIA, UNSPECIFIED HYPERLIPIDEMIA TYPE: ICD-10-CM

## 2022-08-16 LAB
25(OH)D3 SERPL-MCNC: 32.7 NG/ML (ref 30–100)
ALBUMIN SERPL-MCNC: 3.8 G/DL (ref 3.4–5)
ALBUMIN/GLOB SERPL: 1.1 {RATIO} (ref 0.8–1.7)
ALP SERPL-CCNC: 82 U/L (ref 45–117)
ALT SERPL-CCNC: 42 U/L (ref 16–61)
ANION GAP SERPL CALC-SCNC: 3 MMOL/L (ref 3–18)
AST SERPL-CCNC: 19 U/L (ref 10–38)
BASOPHILS # BLD: 0.1 K/UL (ref 0–0.1)
BASOPHILS NFR BLD: 1 % (ref 0–2)
BILIRUB SERPL-MCNC: 0.5 MG/DL (ref 0.2–1)
BUN SERPL-MCNC: 9 MG/DL (ref 7–18)
BUN/CREAT SERPL: 8 (ref 12–20)
CALCIUM SERPL-MCNC: 9.3 MG/DL (ref 8.5–10.1)
CHLORIDE SERPL-SCNC: 108 MMOL/L (ref 100–111)
CHOLEST SERPL-MCNC: 133 MG/DL
CO2 SERPL-SCNC: 30 MMOL/L (ref 21–32)
CREAT SERPL-MCNC: 1.18 MG/DL (ref 0.6–1.3)
CREAT UR-MCNC: 163 MG/DL (ref 30–125)
DIFFERENTIAL METHOD BLD: NORMAL
EOSINOPHIL # BLD: 0.2 K/UL (ref 0–0.4)
EOSINOPHIL NFR BLD: 2 % (ref 0–5)
ERYTHROCYTE [DISTWIDTH] IN BLOOD BY AUTOMATED COUNT: 14.2 % (ref 11.6–14.5)
EST. AVERAGE GLUCOSE BLD GHB EST-MCNC: 189 MG/DL
GLOBULIN SER CALC-MCNC: 3.6 G/DL (ref 2–4)
GLUCOSE SERPL-MCNC: 148 MG/DL (ref 74–99)
HBA1C MFR BLD: 8.2 % (ref 4.2–5.6)
HCT VFR BLD AUTO: 44.7 % (ref 36–48)
HDLC SERPL-MCNC: 38 MG/DL (ref 40–60)
HDLC SERPL: 3.5 {RATIO} (ref 0–5)
HGB BLD-MCNC: 14.8 G/DL (ref 13–16)
IMM GRANULOCYTES # BLD AUTO: 0 K/UL (ref 0–0.04)
IMM GRANULOCYTES NFR BLD AUTO: 0 % (ref 0–0.5)
LDLC SERPL CALC-MCNC: 72 MG/DL (ref 0–100)
LIPID PROFILE,FLP: ABNORMAL
LYMPHOCYTES # BLD: 2.1 K/UL (ref 0.9–3.6)
LYMPHOCYTES NFR BLD: 25 % (ref 21–52)
MCH RBC QN AUTO: 31.6 PG (ref 24–34)
MCHC RBC AUTO-ENTMCNC: 33.1 G/DL (ref 31–37)
MCV RBC AUTO: 95.5 FL (ref 78–100)
MICROALBUMIN UR-MCNC: 8.58 MG/DL (ref 0–3)
MICROALBUMIN/CREAT UR-RTO: 53 MG/G (ref 0–30)
MONOCYTES # BLD: 0.6 K/UL (ref 0.05–1.2)
MONOCYTES NFR BLD: 7 % (ref 3–10)
NEUTS SEG # BLD: 5.4 K/UL (ref 1.8–8)
NEUTS SEG NFR BLD: 65 % (ref 40–73)
NRBC # BLD: 0 K/UL (ref 0–0.01)
NRBC BLD-RTO: 0 PER 100 WBC
PLATELET # BLD AUTO: 170 K/UL (ref 135–420)
PMV BLD AUTO: 11 FL (ref 9.2–11.8)
POTASSIUM SERPL-SCNC: 5 MMOL/L (ref 3.5–5.5)
PROT SERPL-MCNC: 7.4 G/DL (ref 6.4–8.2)
RBC # BLD AUTO: 4.68 M/UL (ref 4.35–5.65)
SODIUM SERPL-SCNC: 141 MMOL/L (ref 136–145)
TRIGL SERPL-MCNC: 115 MG/DL (ref ?–150)
TSH SERPL DL<=0.05 MIU/L-ACNC: 1.06 UIU/ML (ref 0.36–3.74)
VLDLC SERPL CALC-MCNC: 23 MG/DL
WBC # BLD AUTO: 8.3 K/UL (ref 4.6–13.2)

## 2022-08-16 PROCEDURE — 84443 ASSAY THYROID STIM HORMONE: CPT

## 2022-08-16 PROCEDURE — 36415 COLL VENOUS BLD VENIPUNCTURE: CPT

## 2022-08-16 PROCEDURE — 80053 COMPREHEN METABOLIC PANEL: CPT

## 2022-08-16 PROCEDURE — 80061 LIPID PANEL: CPT

## 2022-08-16 PROCEDURE — 82043 UR ALBUMIN QUANTITATIVE: CPT

## 2022-08-16 PROCEDURE — 83036 HEMOGLOBIN GLYCOSYLATED A1C: CPT

## 2022-08-16 PROCEDURE — 85025 COMPLETE CBC W/AUTO DIFF WBC: CPT

## 2022-08-16 PROCEDURE — 82306 VITAMIN D 25 HYDROXY: CPT

## 2022-08-23 ENCOUNTER — OFFICE VISIT (OUTPATIENT)
Dept: FAMILY MEDICINE CLINIC | Age: 72
End: 2022-08-23
Payer: MEDICARE

## 2022-08-23 VITALS
HEART RATE: 58 BPM | OXYGEN SATURATION: 95 % | RESPIRATION RATE: 16 BRPM | TEMPERATURE: 97.8 F | DIASTOLIC BLOOD PRESSURE: 86 MMHG | SYSTOLIC BLOOD PRESSURE: 144 MMHG | WEIGHT: 162.2 LBS | HEIGHT: 66 IN | BODY MASS INDEX: 26.07 KG/M2

## 2022-08-23 DIAGNOSIS — R80.9 TYPE 2 DIABETES MELLITUS WITH MICROALBUMINURIA, WITHOUT LONG-TERM CURRENT USE OF INSULIN (HCC): ICD-10-CM

## 2022-08-23 DIAGNOSIS — E78.5 HYPERLIPIDEMIA, UNSPECIFIED HYPERLIPIDEMIA TYPE: ICD-10-CM

## 2022-08-23 DIAGNOSIS — E11.29 TYPE 2 DIABETES MELLITUS WITH MICROALBUMINURIA, WITHOUT LONG-TERM CURRENT USE OF INSULIN (HCC): ICD-10-CM

## 2022-08-23 DIAGNOSIS — E55.9 VITAMIN D DEFICIENCY: ICD-10-CM

## 2022-08-23 DIAGNOSIS — E05.90 SUBCLINICAL HYPERTHYROIDISM: ICD-10-CM

## 2022-08-23 DIAGNOSIS — F17.200 SMOKING: ICD-10-CM

## 2022-08-23 DIAGNOSIS — I10 ESSENTIAL HYPERTENSION: Primary | ICD-10-CM

## 2022-08-23 PROCEDURE — G8754 DIAS BP LESS 90: HCPCS | Performed by: INTERNAL MEDICINE

## 2022-08-23 PROCEDURE — 3017F COLORECTAL CA SCREEN DOC REV: CPT | Performed by: INTERNAL MEDICINE

## 2022-08-23 PROCEDURE — G8753 SYS BP > OR = 140: HCPCS | Performed by: INTERNAL MEDICINE

## 2022-08-23 PROCEDURE — 2022F DILAT RTA XM EVC RTNOPTHY: CPT | Performed by: INTERNAL MEDICINE

## 2022-08-23 PROCEDURE — G8432 DEP SCR NOT DOC, RNG: HCPCS | Performed by: INTERNAL MEDICINE

## 2022-08-23 PROCEDURE — 3052F HG A1C>EQUAL 8.0%<EQUAL 9.0%: CPT | Performed by: INTERNAL MEDICINE

## 2022-08-23 PROCEDURE — 1123F ACP DISCUSS/DSCN MKR DOCD: CPT | Performed by: INTERNAL MEDICINE

## 2022-08-23 PROCEDURE — G8417 CALC BMI ABV UP PARAM F/U: HCPCS | Performed by: INTERNAL MEDICINE

## 2022-08-23 PROCEDURE — G8536 NO DOC ELDER MAL SCRN: HCPCS | Performed by: INTERNAL MEDICINE

## 2022-08-23 PROCEDURE — G8427 DOCREV CUR MEDS BY ELIG CLIN: HCPCS | Performed by: INTERNAL MEDICINE

## 2022-08-23 PROCEDURE — 99214 OFFICE O/P EST MOD 30 MIN: CPT | Performed by: INTERNAL MEDICINE

## 2022-08-23 PROCEDURE — 1101F PT FALLS ASSESS-DOCD LE1/YR: CPT | Performed by: INTERNAL MEDICINE

## 2022-08-23 RX ORDER — DAPAGLIFLOZIN AND METFORMIN HYDROCHLORIDE 5; 1000 MG/1; MG/1
2 TABLET, FILM COATED, EXTENDED RELEASE ORAL
Qty: 180 TABLET | Refills: 1 | Status: SHIPPED | OUTPATIENT
Start: 2022-08-23 | End: 2022-08-23 | Stop reason: SDUPTHER

## 2022-08-23 RX ORDER — LOSARTAN POTASSIUM AND HYDROCHLOROTHIAZIDE 12.5; 1 MG/1; MG/1
1 TABLET ORAL DAILY
Qty: 90 TABLET | Refills: 1 | Status: SHIPPED | OUTPATIENT
Start: 2022-08-23 | End: 2022-08-23 | Stop reason: SDUPTHER

## 2022-08-23 RX ORDER — LOSARTAN POTASSIUM AND HYDROCHLOROTHIAZIDE 12.5; 1 MG/1; MG/1
1 TABLET ORAL DAILY
Qty: 90 TABLET | Refills: 1 | Status: SHIPPED | OUTPATIENT
Start: 2022-08-23

## 2022-08-23 RX ORDER — DAPAGLIFLOZIN AND METFORMIN HYDROCHLORIDE 5; 1000 MG/1; MG/1
2 TABLET, FILM COATED, EXTENDED RELEASE ORAL
Qty: 180 TABLET | Refills: 1 | Status: SHIPPED | OUTPATIENT
Start: 2022-08-23

## 2022-08-23 NOTE — PROGRESS NOTES
Patient seen for routine follow up c/o dizzy spells at times, and decreased appetite. Health Maintenance Due   Topic Date Due    Shingrix Vaccine Age 49> (1 of 2) Never done    COVID-19 Vaccine (4 - Booster for Moderna series) 03/09/2022     1. \"Have you been to the ER, urgent care clinic since your last visit? Hospitalized since your last visit? \" No    2. \"Have you seen or consulted any other health care providers outside of the [address] [place] [address] since your last visit? \" No     3. For patients aged 39-70: Has the patient had a colonoscopy / FIT/ Cologuard? Yes - no Care Gap present      If the patient is female:    4. For patients aged 41-77: Has the patient had a mammogram within the past 2 years? NA - based on age or sex      11. For patients aged 21-65: Has the patient had a pap smear?  NA - based on age or sex

## 2022-08-23 NOTE — PROGRESS NOTES
Assessment/ Plan:   Diagnoses and all orders for this visit:    1. Essential hypertension-still uncontrolled; now that he has DM and microalb, will change Bisoprolol to ARB  -     losartan-hydroCHLOROthiazide (HYZAAR) 100-12.5 mg per tablet; Take 1 Tablet by mouth daily. 2. Type 2 diabetes mellitus with microalbuminuria, without long-term current use of insulin (HCC)-A1c is above goal so will add Farxiga to Metformin tracy with the microalb that is a recent finding  -     dapagliflozin-metformin (Xigduo XR) 5-1,000 mg TBph; Take 2 Tablets by mouth daily (with breakfast). 3. Hyperlipidemia, unspecified hyperlipidemia type-LDL goal of less than 70 on Lipitor; will inc dose next visit if still not at goal    4. Vitamin D deficiency-improved but still on the low side so continue with Vit D rx    5. Smoking-smoking cessation advised    6. Subclinical hyperthyroidism-TSh at goal, no sxs      Follow-up and Dispositions    Return in about 3 months (around 11/23/2022) for follow up. Chief Complaint   Patient presents with    Follow Up Chronic Condition       Pt is a 70y.o. year old male who presents for follow up of his chronic medical problems    Health Maintenance Due   Topic Date Due    Shingrix Vaccine Age 49> (1 of 2) Never done    COVID-19 Vaccine (4 - Booster for Moderna series) 03/09/2022        Wt Readings from Last 3 Encounters:   08/23/22 162 lb 3.2 oz (73.6 kg)   03/03/22 160 lb 8 oz (72.8 kg)   11/02/21 155 lb 9.6 oz (70.6 kg)        BP Readings from Last 3 Encounters:   08/23/22 (!) 144/86   03/03/22 136/70   11/02/21 (!) 140/82    Repeat BP-  Home BP-normal per wife    Still smoking?  yes    Lab Results   Component Value Date/Time    Hemoglobin A1c 8.2 (H) 08/16/2022 01:10 PM    Hemoglobin A1c (POC) 7.6 03/03/2022 01:35 PM    Compliant with meds per wife  Positive for microalb  Does not like big pills      Lab Results   Component Value Date/Time    Cholesterol, total 133 08/16/2022 01:10 PM    HDL Cholesterol 38 (L) 08/16/2022 01:10 PM    LDL, calculated 72 08/16/2022 01:10 PM    VLDL, calculated 23 08/16/2022 01:10 PM    Triglyceride 115 08/16/2022 01:10 PM    CHOL/HDL Ratio 3.5 08/16/2022 01:10 PM      Lab Results   Component Value Date/Time    TSH 1.06 08/16/2022 01:10 PM      Lab Results   Component Value Date/Time    Vitamin D 25-Hydroxy 32.7 08/16/2022 01:10 PM      On rx        ROS:    Pt denies: Wt loss, Fever/Chills, HA, Visual changes, Fatigue, Chest pain, SOB, SALMON, Abd pain, N/V/D/C, Blood in stool or urine, Edema. Pertinent positive as above in HPI. All others were negative    Patient Active Problem List   Diagnosis Code    Smoking F17.200    Essential hypertension I10    Subclinical hyperthyroidism E05.90    Type 2 diabetes mellitus with hyperglycemia, without long-term current use of insulin (HCC) E11.65    Primary osteoarthritis of left knee M17.12    Overweight (BMI 25.0-29. 9) E66.3    Advanced directives, counseling/discussion Z71.89    Tubular adenoma of colon D12.6    Hyperlipidemia E78.5       Past Medical History:   Diagnosis Date    Hypertension        Current Outpatient Medications   Medication Sig Dispense Refill    Vitamin D2 1,250 mcg (50,000 unit) capsule TAKE 1 CAPSULE BY MOUTH  EVERY 7 DAYS 12 Capsule 0    bisoprolol-hydroCHLOROthiazide (ZIAC) 10-6.25 mg per tablet TAKE 2 TABLETS EVERY  Tablet 2    atorvastatin (LIPITOR) 10 mg tablet TAKE 1 TABLET EVERY DAY 90 Tablet 2    amLODIPine (NORVASC) 10 mg tablet Take 1 Tablet by mouth daily. 90 Tablet 2    metFORMIN (GLUCOPHAGE) 500 mg tablet 2 po BID with meals 360 Tablet 1    pramoxine-hydrocortisone (ANALPRAM HC 1%) 1-1 % rectal cream Insert  into rectum two (2) times a day.  30 g 1       Social History     Tobacco Use   Smoking Status Light Smoker   Smokeless Tobacco Never       No Known Allergies    Patient Labs were reviewed: yes    Patient Past Records were reviewed: yes      Objective:     Vitals: 08/23/22 1100 08/23/22 1115   BP: (!) 146/71 (!) 144/86   Pulse: (!) 58 (!) 58   Resp: 16    Temp: 97.8 °F (36.6 °C)    TempSrc: Temporal    SpO2: 95%    Weight: 162 lb 3.2 oz (73.6 kg)    Height: 5' 6\" (1.676 m)      Body mass index is 26.18 kg/m². Exam:   Appearance: alert, well appearing,  oriented to person, place, and time, acyanotic, in no respiratory distress and well hydrated. HEENT:  NC/AT, pink conj, anicteric sclerae  Neck:  No cervical lymphadenopathy, no JVD, no thyromegaly, no carotid bruit  Heart:  RRR without M/R/G  Lungs:  CTAB, no rhonchi, rales, or wheezes with good air exchange   Abdomen:  Non-tender, pos bowel sounds, no hepatosplenomegaly  Ext:  No C/C/E    Skin: no rash  Neuro: no lateralizing signs, CNs II-XII intact            I have discussed the diagnosis with the patient and the intended plan as seen in the above orders. The patient has received an After-Visit Summary and questions were answered concerning future plans. Medication Side Effects and Warnings were discussed with patient: yes    Patient verbalized understanding of above instructions.     Cristine Andre MD  Internal Medicine  [facility]

## 2022-11-10 DIAGNOSIS — I10 ESSENTIAL HYPERTENSION: ICD-10-CM

## 2022-11-10 RX ORDER — LOSARTAN POTASSIUM AND HYDROCHLOROTHIAZIDE 12.5; 1 MG/1; MG/1
1 TABLET ORAL DAILY
Qty: 90 TABLET | Refills: 1 | Status: SHIPPED | OUTPATIENT
Start: 2022-11-10

## 2022-11-23 ENCOUNTER — OFFICE VISIT (OUTPATIENT)
Dept: FAMILY MEDICINE CLINIC | Age: 72
End: 2022-11-23
Payer: MEDICARE

## 2022-11-23 VITALS
BODY MASS INDEX: 24.59 KG/M2 | SYSTOLIC BLOOD PRESSURE: 126 MMHG | OXYGEN SATURATION: 96 % | HEART RATE: 70 BPM | TEMPERATURE: 98.4 F | HEIGHT: 66 IN | DIASTOLIC BLOOD PRESSURE: 68 MMHG | RESPIRATION RATE: 16 BRPM | WEIGHT: 153 LBS

## 2022-11-23 DIAGNOSIS — E78.5 HYPERLIPIDEMIA, UNSPECIFIED HYPERLIPIDEMIA TYPE: ICD-10-CM

## 2022-11-23 DIAGNOSIS — I25.10 CORONARY ARTERY CALCIFICATION: ICD-10-CM

## 2022-11-23 DIAGNOSIS — R80.9 TYPE 2 DIABETES MELLITUS WITH MICROALBUMINURIA, WITHOUT LONG-TERM CURRENT USE OF INSULIN (HCC): ICD-10-CM

## 2022-11-23 DIAGNOSIS — Z87.891 PERSONAL HISTORY OF TOBACCO USE, PRESENTING HAZARDS TO HEALTH: ICD-10-CM

## 2022-11-23 DIAGNOSIS — I10 ESSENTIAL HYPERTENSION: ICD-10-CM

## 2022-11-23 DIAGNOSIS — Z23 NEEDS FLU SHOT: ICD-10-CM

## 2022-11-23 DIAGNOSIS — Z00.00 MEDICARE ANNUAL WELLNESS VISIT, SUBSEQUENT: Primary | ICD-10-CM

## 2022-11-23 DIAGNOSIS — I25.84 CORONARY ARTERY CALCIFICATION: ICD-10-CM

## 2022-11-23 DIAGNOSIS — F17.200 SMOKING: ICD-10-CM

## 2022-11-23 DIAGNOSIS — E11.29 TYPE 2 DIABETES MELLITUS WITH MICROALBUMINURIA, WITHOUT LONG-TERM CURRENT USE OF INSULIN (HCC): ICD-10-CM

## 2022-11-23 LAB — HBA1C MFR BLD HPLC: 7.5 %

## 2022-11-23 PROCEDURE — 1123F ACP DISCUSS/DSCN MKR DOCD: CPT | Performed by: INTERNAL MEDICINE

## 2022-11-23 PROCEDURE — 83036 HEMOGLOBIN GLYCOSYLATED A1C: CPT | Performed by: INTERNAL MEDICINE

## 2022-11-23 PROCEDURE — G8752 SYS BP LESS 140: HCPCS | Performed by: INTERNAL MEDICINE

## 2022-11-23 PROCEDURE — 90694 VACC AIIV4 NO PRSRV 0.5ML IM: CPT | Performed by: INTERNAL MEDICINE

## 2022-11-23 PROCEDURE — 3078F DIAST BP <80 MM HG: CPT | Performed by: INTERNAL MEDICINE

## 2022-11-23 PROCEDURE — G0439 PPPS, SUBSEQ VISIT: HCPCS | Performed by: INTERNAL MEDICINE

## 2022-11-23 PROCEDURE — G8536 NO DOC ELDER MAL SCRN: HCPCS | Performed by: INTERNAL MEDICINE

## 2022-11-23 PROCEDURE — G8427 DOCREV CUR MEDS BY ELIG CLIN: HCPCS | Performed by: INTERNAL MEDICINE

## 2022-11-23 PROCEDURE — 99214 OFFICE O/P EST MOD 30 MIN: CPT | Performed by: INTERNAL MEDICINE

## 2022-11-23 PROCEDURE — G8420 CALC BMI NORM PARAMETERS: HCPCS | Performed by: INTERNAL MEDICINE

## 2022-11-23 PROCEDURE — 3051F HG A1C>EQUAL 7.0%<8.0%: CPT | Performed by: INTERNAL MEDICINE

## 2022-11-23 PROCEDURE — G0008 ADMIN INFLUENZA VIRUS VAC: HCPCS | Performed by: INTERNAL MEDICINE

## 2022-11-23 PROCEDURE — 1101F PT FALLS ASSESS-DOCD LE1/YR: CPT | Performed by: INTERNAL MEDICINE

## 2022-11-23 PROCEDURE — 3074F SYST BP LT 130 MM HG: CPT | Performed by: INTERNAL MEDICINE

## 2022-11-23 PROCEDURE — 3017F COLORECTAL CA SCREEN DOC REV: CPT | Performed by: INTERNAL MEDICINE

## 2022-11-23 PROCEDURE — 2022F DILAT RTA XM EVC RTNOPTHY: CPT | Performed by: INTERNAL MEDICINE

## 2022-11-23 PROCEDURE — G8754 DIAS BP LESS 90: HCPCS | Performed by: INTERNAL MEDICINE

## 2022-11-23 PROCEDURE — G8432 DEP SCR NOT DOC, RNG: HCPCS | Performed by: INTERNAL MEDICINE

## 2022-11-23 NOTE — PROGRESS NOTES
1. \"Have you been to the ER, urgent care clinic since your last visit? Hospitalized since your last visit? \" No    2. \"Have you seen or consulted any other health care providers outside of the [address] [place] [address] since your last visit? \" No     3. For patients aged 39-70: Has the patient had a colonoscopy / FIT/ Cologuard? Yes - Care Gap present. Most recent result on file      If the patient is female:    4. For patients aged 41-77: Has the patient had a mammogram within the past 2 years? NA - based on age or sex      11. For patients aged 21-65: Has the patient had a pap smear? NA - based on age or sex      Patient was given VIS for review, consent was obtained and per orders of ***, injection of *** given by Ronald Chavarria LPN. Patient observed. No signs nor symptoms of any adverse reactions. Patient tolerated injection well.

## 2022-11-23 NOTE — PATIENT INSTRUCTIONS
Medicare Wellness Visit, Male    The best way to live healthy is to have a lifestyle where you eat a well-balanced diet, exercise regularly, limit alcohol use, and quit all forms of tobacco/nicotine, if applicable. Regular preventive services are another way to keep healthy. Preventive services (vaccines, screening tests, monitoring & exams) can help personalize your care plan, which helps you manage your own care. Screening tests can find health problems at the earliest stages, when they are easiest to treat. [facility] follows the current, evidence-based guidelines published by the [facility] ([facility]) when recommending preventive services for our patients. Because we follow these guidelines, sometimes recommendations change over time as research supports it. (For example, a prostate screening blood test is no longer routinely recommended for men with no symptoms). Of course, you and your doctor may decide to screen more often for some diseases, based on your risk and co-morbidities (chronic disease you are already diagnosed with). Preventive services for you include:  - Medicare offers their members a free annual wellness visit, which is time for you and your primary care provider to discuss and plan for your preventive service needs. Take advantage of this benefit every year!  -All adults over age 72 should receive the recommended pneumonia vaccines. Current USPSTF guidelines recommend a series of two vaccines for the best pneumonia protection.   -All adults should have a flu vaccine yearly and tetanus vaccine every 10 years.  -All adults age 48 and older should receive the shingles vaccines (series of two vaccines).        -All adults age 38-68 who are overweight should have a diabetes screening test once every three years.   -Other screening tests & preventive services for persons with diabetes include: an eye exam to screen for diabetic retinopathy, a kidney function test, a foot exam, and stricter control over your cholesterol.   -Cardiovascular screening for adults with routine risk involves an electrocardiogram (ECG) at intervals determined by the provider.   -Colorectal cancer screening should be done for adults age 54-65 with no increased risk factors for colorectal cancer. There are a number of acceptable methods of screening for this type of cancer. Each test has its own benefits and drawbacks. Discuss with your provider what is most appropriate for you during your annual wellness visit. The different tests include: colonoscopy (considered the best screening method), a fecal occult blood test, a fecal DNA test, and sigmoidoscopy.  -All adults born between [facility] should be screened once for Hepatitis C.  -An Abdominal Aortic Aneurysm (AAA) Screening is recommended for men age 73-68 who has ever smoked in their lifetime. Here is a list of your current Health Maintenance items (your personalized list of preventive services) with a due date:  Health Maintenance Due   Topic Date Due    Shingles Vaccine (1 of 2) Never done    Colorectal Screening  12/26/2019    COVID-19 Vaccine (4 - Booster for Moderna series) 01/04/2022    Yearly Flu Vaccine (1) 08/01/2022       Vaccine Information Statement    Influenza (Flu) Vaccine (Inactivated or Recombinant): What You Need to Know    Many vaccine information statements are available in Indonesian and other languages. See www.immunize.org/vis. Hojas de información sobre vacunas están disponibles en español y en muchos otros idiomas. Visite www.immunize.org/vis. 1. Why get vaccinated? Influenza vaccine can prevent influenza (flu). Flu is a contagious disease that spreads around the [place] every year, usually between October and May. Anyone can get the flu, but it is more dangerous for some people.  Infants and young children, people 72 years and older, pregnant people, and people with certain health conditions or a weakened immune system are at greatest risk of flu complications. Pneumonia, bronchitis, sinus infections, and ear infections are examples of flu-related complications. If you have a medical condition, such as heart disease, cancer, or diabetes, flu can make it worse. Flu can cause fever and chills, sore throat, muscle aches, fatigue, cough, headache, and runny or stuffy nose. Some people may have vomiting and diarrhea, though this is more common in children than adults. In an average year, thousands of people in the [facility] die from flu, and many more are hospitalized. Flu vaccine prevents millions of illnesses and flu-related visits to the doctor each year. 2. Influenza vaccines     CDC recommends everyone 6 months and older get vaccinated every flu season. Children 6 months through 6years of age may need 2 doses during a single flu season. Everyone else needs only 1 dose each flu season. It takes about 2 weeks for protection to develop after vaccination. There are many flu viruses, and they are always changing. Each year a new flu vaccine is made to protect against the influenza viruses believed to be likely to cause disease in the upcoming flu season. Even when the vaccine doesnt exactly match these viruses, it may still provide some protection. Influenza vaccine does not cause flu. Influenza vaccine may be given at the same time as other vaccines. 3. Talk with your health care provider    Tell your vaccination provider if the person getting the vaccine:  Has had an allergic reaction after a previous dose of influenza vaccine, or has any severe, life-threatening allergies   Has ever had Guillain-Barré Syndrome (also called GBS)    In some cases, your health care provider may decide to postpone influenza vaccination until a future visit. Influenza vaccine can be administered at any time during pregnancy.  People who are or will be pregnant during influenza season should receive inactivated influenza vaccine. People with minor illnesses, such as a cold, may be vaccinated. People who are moderately or severely ill should usually wait until they recover before getting influenza vaccine. Your health care provider can give you more information. 4. Risks of a vaccine reaction    Soreness, redness, and swelling where the shot is given, fever, muscle aches, and headache can happen after influenza vaccination. There may be a very small increased risk of Guillain-Barré Syndrome (GBS) after inactivated influenza vaccine (the flu shot). Dorothy Hsu children who get the flu shot along with pneumococcal vaccine (PCV13) and/or DTaP vaccine at the same time might be slightly more likely to have a seizure caused by fever. Tell your health care provider if a child who is getting flu vaccine has ever had a seizure. People sometimes faint after medical procedures, including vaccination. Tell your provider if you feel dizzy or have vision changes or ringing in the ears. As with any medicine, there is a very remote chance of a vaccine causing a severe allergic reaction, other serious injury, or death. 5. What if there is a serious problem? An allergic reaction could occur after the vaccinated person leaves the clinic. If you see signs of a severe allergic reaction (hives, swelling of the face and throat, difficulty breathing, a fast heartbeat, dizziness, or weakness), call 9-1-1 and get the person to the nearest hospital.    For other signs that concern you, call your health care provider. Adverse reactions should be reported to the Vaccine Adverse Event Reporting System (VAERS). Your health care provider will usually file this report, or you can do it yourself. Visit the VAERS website at www.vaers. hhs.gov or call 1-966.781.9226. VAERS is only for reporting reactions, and VAERS staff members do not give medical advice.     6. The [facility] Vaccine Injury Compensation Program    The National Vaccine Injury Compensation Program (VICP) is a federal program that was created to compensate people who may have been injured by certain vaccines. Claims regarding alleged injury or death due to vaccination have a time limit for filing, which may be as short as two years. Visit the VICP website at www.hrsa.gov/vaccinecompensation or call 5-270.148.9833 to learn about the program and about filing a claim. 7. How can I learn more? Ask your health care provider. Call your local or state health department. Visit the website of the Food and Drug Administration (FDA) for vaccine package inserts and additional information at www.fda.gov/vaccines-blood-biologics/vaccines. Contact the Centers for Disease Control and Prevention (CDC): Call 7-766.824.1441 (1-800-CDC-INFO) or  Visit CDCs influenza website at www.cdc.gov/flu. Vaccine Information Statement   Inactivated Influenza Vaccine   8/6/2021  42 PHIL [place] [id number]   Department of Health and Human Services  Centers for Disease Control and Prevention    Office Use Only

## 2022-11-23 NOTE — PROGRESS NOTES
Assessment/ Plan:   Diagnoses and all orders for this visit:    1. Medicare annual wellness visit, subsequent-see note below    2. Type 2 diabetes mellitus with microalbuminuria, without long-term current use of insulin (HCC)-A1c is trending down, continue with present meds: Xigduo  -     AMB POC HEMOGLOBIN A1C    3. Hyperlipidemia, unspecified hyperlipidemia type-goal LDL of less than 70 on Lipitor  Lab Results   Component Value Date/Time    Cholesterol, total 133 08/16/2022 01:10 PM    HDL Cholesterol 38 (L) 08/16/2022 01:10 PM    LDL, calculated 72 08/16/2022 01:10 PM    VLDL, calculated 23 08/16/2022 01:10 PM    Triglyceride 115 08/16/2022 01:10 PM    CHOL/HDL Ratio 3.5 08/16/2022 01:10 PM      4. Essential hypertension-controlled, continue with present meds    5. Coronary artery calcification-seen on low dose lung CT; advised optimal goals for LDL, diabetes and good BP control, smoking cessation    6. Smoking-advised of need to do yearly low dose lung CT    7. Vit d def-on rx    Follow-up and Dispositions    Return in about 3 months (around 2/23/2023) for follow up.      Add PSA, TSH and Vit d with next labs                Chief Complaint   Patient presents with    Annual Wellness Visit    Follow Up Chronic Condition     3 month       Pt is a 67y.o. year old male who presents for follow up of his chronic medical problems    Health Maintenance Due   Topic Date Due    Shingrix Vaccine Age 49> (1 of 2) Never done    Colorectal Cancer Screening Combo -done downstairs 12/26/2019    COVID-19 Vaccine (4 - Booster for Moderna series) 01/04/2022    Flu Vaccine (1)-today 08/01/2022    Medicare Yearly Exam -today 11/03/2022      Wt Readings from Last 3 Encounters:   11/23/22 153 lb (69.4 kg)   08/23/22 162 lb 3.2 oz (73.6 kg)   03/03/22 160 lb 8 oz (72.8 kg)        BP Readings from Last 3 Encounters:   11/23/22 126/68   08/23/22 (!) 144/86   03/03/22 136/70        Last Point of Care HGB A1C  Hemoglobin A1c (POC)   Date Value Ref Range Status   11/23/2022 7.5 % Final     From 8.2      Still smoking    CT 8/2021  Severe coronary calcif    Lab Results   Component Value Date/Time    Cholesterol, total 133 08/16/2022 01:10 PM    HDL Cholesterol 38 (L) 08/16/2022 01:10 PM    LDL, calculated 72 08/16/2022 01:10 PM    VLDL, calculated 23 08/16/2022 01:10 PM    Triglyceride 115 08/16/2022 01:10 PM    CHOL/HDL Ratio 3.5 08/16/2022 01:10 PM    On Lipitor    Lab Results   Component Value Date/Time    Vitamin D 25-Hydroxy 32.7 08/16/2022 01:10 PM      On rx      ROS:    Pt denies: Wt loss, Fever/Chills, HA, Visual changes, Fatigue, Chest pain, SOB, SALMON, Abd pain, N/V/D/C, Blood in stool or urine, Edema. Pertinent positive as above in HPI. All others were negative    Patient Active Problem List   Diagnosis Code    Smoking F17.200    Essential hypertension I10    Subclinical hyperthyroidism E05.90    Type 2 diabetes mellitus with hyperglycemia, without long-term current use of insulin (HCC) E11.65    Primary osteoarthritis of left knee M17.12    Overweight (BMI 25.0-29. 9) E66.3    Advanced directives, counseling/discussion Z71.89    Tubular adenoma of colon D12.6    Hyperlipidemia E78.5       Past Medical History:   Diagnosis Date    Hypertension        Current Outpatient Medications   Medication Sig Dispense Refill    losartan-hydroCHLOROthiazide (HYZAAR) 100-12.5 mg per tablet Take 1 Tablet by mouth daily. 90 Tablet 1    dapagliflozin-metformin (Xigduo XR) 5-1,000 mg TBph Take 2 Tablets by mouth daily (with breakfast). 180 Tablet 1    Vitamin D2 1,250 mcg (50,000 unit) capsule TAKE 1 CAPSULE BY MOUTH  EVERY 7 DAYS 12 Capsule 0    atorvastatin (LIPITOR) 10 mg tablet TAKE 1 TABLET EVERY DAY 90 Tablet 2    amLODIPine (NORVASC) 10 mg tablet Take 1 Tablet by mouth daily. 90 Tablet 2    pramoxine-hydrocortisone (ANALPRAM HC 1%) 1-1 % rectal cream Insert  into rectum two (2) times a day.  30 g 1       Social History     Tobacco Use   Smoking Status Light Smoker   Smokeless Tobacco Never       No Known Allergies    Patient Labs were reviewed: yes    Patient Past Records were reviewed: yes      Objective:     Vitals:    11/23/22 1051 11/23/22 1140   BP: (!) 147/79 126/68   Pulse: 70    Resp: 16    Temp: 98.4 °F (36.9 °C)    TempSrc: Temporal    SpO2: 96%    Weight: 153 lb (69.4 kg)    Height: 5' 6\" (1.676 m)      Body mass index is 24.69 kg/m². Exam:   Appearance: alert, well appearing,  oriented to person, place, and time, acyanotic, in no respiratory distress and well hydrated. HEENT:  NC/AT, pink conj, anicteric sclerae  Neck:  No cervical lymphadenopathy, no JVD, no thyromegaly, no carotid bruit  Heart:  RRR without M/R/G  Lungs:  CTAB, no rhonchi, rales, or wheezes with good air exchange   Abdomen:  Non-tender, pos bowel sounds, no hepatosplenomegaly  Ext:  No C/C/E    Skin: no rash  Neuro: no lateralizing signs, CNs II-XII intact            I have discussed the diagnosis with the patient and the intended plan as seen in the above orders. The patient has received an After-Visit Summary and questions were answered concerning future plans. Medication Side Effects and Warnings were discussed with patient: yes    Patient verbalized understanding of above instructions. Andrew Crockett MD  Internal Medicine  [facility]         This is the Subsequent Medicare Annual Wellness Exam, performed 12 months or more after the Initial AWV or the last Subsequent AWV    I have reviewed the patient's medical history in detail and updated the computerized patient record.        Assessment/Plan   Education and counseling provided:  Are appropriate based on today's review and evaluation  End-of-Life planning (with patient's consent)-primary decision maker verified  Pneumococcal Vaccine-done  Influenza Vaccine-today  Prostate cancer screening tests (PSA, covered annually)-with next labs  Lab Results   Component Value Date/Time    Prostate Specific Ag 1.6 10/14/2020 01:08 AM    Prostate Specific Ag 1.3 04/10/2019 02:48 AM    Prostate Specific Ag 0.6 07/20/2010 09:50 AM      Colorectal cancer screening tests-up to date  Cardiovascular screening blood test-lipids up to date  Screening for glaucoma-eye exam is up to date  Diabetes screening test-A1C done today    1. Medicare annual wellness visit, subsequent-Refer to above for plan and to patient instructions for recommendations on HM     Has hearing aids-does not use them    2. Needs flu shot  -     INFLUENZA, FLUAD, (AGE 72 Y+), IM, PF, 0.5 ML  3. Personal history of tobacco use, presenting hazards to health  -     CT LOW DOSE LUNG CANCER SCREENING; Future     RTC yearly for wellness visit    Depression Risk Factor Screening     3 most recent PHQ Screens 3/3/2022   Little interest or pleasure in doing things Not at all   Feeling down, depressed, irritable, or hopeless Not at all   Total Score PHQ 2 0       Alcohol & Drug Abuse Risk Screen    Do you average more than 1 drink per night or more than 7 drinks a week: No    In the past three months have you have had more than 4 drinks containing alcohol on one occasion: No          Functional Ability and Level of Safety    Hearing: Hearing is good. Activities of Daily Living: The home contains: no safety equipment. Patient does total self care      Ambulation: with no difficulty     Fall Risk:  Fall Risk Assessment, last 12 mths 11/2/2021   Able to walk? Yes   Fall in past 12 months? 0   Do you feel unsteady?  0   Are you worried about falling 0      Abuse Screen:  Patient is not abused       Cognitive Screening    Has your family/caregiver stated any concerns about your memory: no     Cognitive Screening: Normal - Clock Drawing Test    Health Maintenance Due     Health Maintenance Due   Topic Date Due    Shingrix Vaccine Age 49> (1 of 2) Never done    COVID-19 Vaccine (4 - Booster for [place]  series) 01/04/2022       Patient Care Team   Patient Care Team:  Haresh Russell MD as PCP - General (Internal Medicine Physician)  Haresh Russell MD as PCP - [facility] Empaneled Provider    History     Patient Active Problem List   Diagnosis Code    Smoking F17.200    Essential hypertension I10    Subclinical hyperthyroidism E05.90    Type 2 diabetes mellitus with hyperglycemia, without long-term current use of insulin ([facility] [address] E11.65    Primary osteoarthritis of left knee M17.12    Overweight (BMI 25.0-29. 9) E66.3    Advanced directives, counseling/discussion Z71.89    Tubular adenoma of colon D12.6    Hyperlipidemia E78.5     Past Medical History:   Diagnosis Date    Hypertension       History reviewed. No pertinent surgical history. Current Outpatient Medications   Medication Sig Dispense Refill    losartan-hydroCHLOROthiazide (HYZAAR) 100-12.5 mg per tablet Take 1 Tablet by mouth daily. 90 Tablet 1    dapagliflozin-metformin (Xigduo XR) 5-1,000 mg TBph Take 2 Tablets by mouth daily (with breakfast). 180 Tablet 1    Vitamin D2 1,250 mcg (50,000 unit) capsule TAKE 1 CAPSULE BY MOUTH  EVERY 7 DAYS 12 Capsule 0    atorvastatin (LIPITOR) 10 mg tablet TAKE 1 TABLET EVERY DAY 90 Tablet 2    amLODIPine (NORVASC) 10 mg tablet Take 1 Tablet by mouth daily. 90 Tablet 2    pramoxine-hydrocortisone (ANALPRAM HC 1%) 1-1 % rectal cream Insert  into rectum two (2) times a day. 30 g 1     No Known Allergies    Family History   Problem Relation Age of Onset    Hypertension Mother     Stroke Mother     Hypertension Father     Stroke Father     Diabetes Brother      Social History     Tobacco Use    Smoking status: Light Smoker    Smokeless tobacco: Never   Substance Use Topics    Alcohol use: Yes     Comment: social Roxy Velasquez MD Discussed with the patient the current USPSTF guidelines released March 9, 2021 for screening for lung cancer.     For adults aged 48 to [de-identified] years who have a 20 pack-year smoking history and currently smoke or have quit within the past 15 years the grade B recommendation is to:  Screen for lung cancer with low-dose computed tomography (LDCT) every year. Stop screening once a person has not smoked for 15 years or has a health problem that limits life expectancy or the ability to have lung surgery. The patient has a 30 pack-year history of cigarette smoking and currently is smoking. Discussed with patient the risks and benefits of screening, including over-diagnosis, false positive rate, and total radiation exposure. The patient currently exhibits no signs or symptoms suggestive of lung cancer. Discussed with patient the importance of compliance with yearly annual lung cancer screenings and willingness to undergo diagnosis and treatment if screening scan is positive. In addition, the patient was counseled regarding the importance of remaining smoke free and/or total smoking cessation.     Also reviewed the following if the patient has Medicare that as of February 10, 2022, Medicare only covers LDCT screening in patients aged 51-72 with at least a 20 pack-year smoking history who currently smoke or have quit in the last 15 years

## 2022-11-29 NOTE — PROGRESS NOTES
.Date/Time:  6/2/2022 4:18 PM    Method of communication with patient:phone    [facility] ( [facility]) made out reach to  patient by telephone to offer Complex Case Management  ( CCM). Provided introduction to self, and explanation of the Ambulatory Care . Contact at [phone] anytime Monday thru Friday 08:00-4:30. henry

## 2023-02-08 DIAGNOSIS — E11.29 TYPE 2 DIABETES MELLITUS WITH MICROALBUMINURIA, WITHOUT LONG-TERM CURRENT USE OF INSULIN (HCC): ICD-10-CM

## 2023-02-08 DIAGNOSIS — R80.9 TYPE 2 DIABETES MELLITUS WITH MICROALBUMINURIA, WITHOUT LONG-TERM CURRENT USE OF INSULIN (HCC): ICD-10-CM

## 2023-02-08 RX ORDER — DAPAGLIFLOZIN AND METFORMIN HYDROCHLORIDE 5; 1000 MG/1; MG/1
2 TABLET, FILM COATED, EXTENDED RELEASE ORAL
Qty: 28 TABLET | Refills: 0 | Status: SHIPPED | OUTPATIENT
Start: 2023-02-08

## 2023-02-15 RX ORDER — DAPAGLIFLOZIN AND METFORMIN HYDROCHLORIDE 5; 1000 MG/1; MG/1
2 TABLET, FILM COATED, EXTENDED RELEASE ORAL
Qty: 180 TABLET | Refills: 1 | Status: SHIPPED | OUTPATIENT
Start: 2023-02-15 | End: 2023-03-13 | Stop reason: ALTCHOICE

## 2023-03-08 RX ORDER — ATORVASTATIN CALCIUM 10 MG/1
TABLET, FILM COATED ORAL
Qty: 90 TABLET | Refills: 1 | Status: SHIPPED | OUTPATIENT
Start: 2023-03-08

## 2023-03-13 ENCOUNTER — OFFICE VISIT (OUTPATIENT)
Facility: CLINIC | Age: 73
End: 2023-03-13

## 2023-03-13 VITALS
WEIGHT: 162 LBS | SYSTOLIC BLOOD PRESSURE: 137 MMHG | RESPIRATION RATE: 15 BRPM | DIASTOLIC BLOOD PRESSURE: 85 MMHG | BODY MASS INDEX: 26.03 KG/M2 | HEART RATE: 94 BPM | HEIGHT: 66 IN

## 2023-03-13 DIAGNOSIS — I10 ESSENTIAL (PRIMARY) HYPERTENSION: ICD-10-CM

## 2023-03-13 DIAGNOSIS — H91.90 HEARING LOSS, UNSPECIFIED HEARING LOSS TYPE, UNSPECIFIED LATERALITY: ICD-10-CM

## 2023-03-13 DIAGNOSIS — E11.21 TYPE II DIABETES MELLITUS WITH NEPHROPATHY (HCC): Primary | ICD-10-CM

## 2023-03-13 DIAGNOSIS — F17.200 SMOKING: ICD-10-CM

## 2023-03-13 DIAGNOSIS — E55.9 VITAMIN D DEFICIENCY, UNSPECIFIED: ICD-10-CM

## 2023-03-13 DIAGNOSIS — E05.90 SUBCLINICAL HYPERTHYROIDISM: ICD-10-CM

## 2023-03-13 DIAGNOSIS — E78.5 HYPERLIPIDEMIA, UNSPECIFIED HYPERLIPIDEMIA TYPE: ICD-10-CM

## 2023-03-13 LAB — HBA1C MFR BLD: 8 %

## 2023-03-13 PROCEDURE — 83036 HEMOGLOBIN GLYCOSYLATED A1C: CPT | Performed by: INTERNAL MEDICINE

## 2023-03-13 PROCEDURE — 3078F DIAST BP <80 MM HG: CPT | Performed by: INTERNAL MEDICINE

## 2023-03-13 PROCEDURE — 1123F ACP DISCUSS/DSCN MKR DOCD: CPT | Performed by: INTERNAL MEDICINE

## 2023-03-13 PROCEDURE — 99214 OFFICE O/P EST MOD 30 MIN: CPT | Performed by: INTERNAL MEDICINE

## 2023-03-13 PROCEDURE — 3075F SYST BP GE 130 - 139MM HG: CPT | Performed by: INTERNAL MEDICINE

## 2023-03-13 SDOH — ECONOMIC STABILITY: HOUSING INSECURITY
IN THE LAST 12 MONTHS, WAS THERE A TIME WHEN YOU DID NOT HAVE A STEADY PLACE TO SLEEP OR SLEPT IN A SHELTER (INCLUDING NOW)?: PATIENT REFUSED

## 2023-03-13 SDOH — ECONOMIC STABILITY: FOOD INSECURITY: WITHIN THE PAST 12 MONTHS, THE FOOD YOU BOUGHT JUST DIDN'T LAST AND YOU DIDN'T HAVE MONEY TO GET MORE.: PATIENT DECLINED

## 2023-03-13 SDOH — ECONOMIC STABILITY: INCOME INSECURITY: HOW HARD IS IT FOR YOU TO PAY FOR THE VERY BASICS LIKE FOOD, HOUSING, MEDICAL CARE, AND HEATING?: PATIENT DECLINED

## 2023-03-13 SDOH — ECONOMIC STABILITY: FOOD INSECURITY: WITHIN THE PAST 12 MONTHS, YOU WORRIED THAT YOUR FOOD WOULD RUN OUT BEFORE YOU GOT MONEY TO BUY MORE.: PATIENT DECLINED

## 2023-03-13 ASSESSMENT — PATIENT HEALTH QUESTIONNAIRE - PHQ9
SUM OF ALL RESPONSES TO PHQ QUESTIONS 1-9: 0
SUM OF ALL RESPONSES TO PHQ QUESTIONS 1-9: 0
2. FEELING DOWN, DEPRESSED OR HOPELESS: 0
1. LITTLE INTEREST OR PLEASURE IN DOING THINGS: 0
SUM OF ALL RESPONSES TO PHQ QUESTIONS 1-9: 0
SUM OF ALL RESPONSES TO PHQ QUESTIONS 1-9: 0
SUM OF ALL RESPONSES TO PHQ9 QUESTIONS 1 & 2: 0

## 2023-03-13 NOTE — PROGRESS NOTES
When asked if patient has any concerns he/she would like to address with Dr. Celena Simental patient states No         Did patient bring someone? Yes      Did the patient have DME equipment? No      Did you take your medication today? No         1. \"Have you been to the ER, urgent care clinic since your last visit? Hospitalized since your last visit? \" Patient states No      2. \"Have you seen or consulted any other health care providers outside of the [address] [place] [address] since your last visit? \" No     3. For patients aged 39-70: Has the patient had a colonoscopy / FIT/ Cologuard? Yes        If the patient is female:     4. For patients aged 41-77: Has the patient had a mammogram within the past 2 years? N/A        5. For patients aged 21-65: Has the patient had a pap smear? {Cancer Care Gap present?   N/A        PHQ-9  3/13/2023   Little interest or pleasure in doing things 0   Little interest or pleasure in doing things -   Feeling down, depressed, or hopeless 0   PHQ-2 Score 0   Total Score PHQ 2 -   PHQ-9 Total Score 0        Health Maintenance   Topic Date Due    AAA screen  Never done    Shingles vaccine (2 of 3) 03/25/2016    COVID-19 Vaccine (4 - Booster for Dillon Eriksson series) 01/04/2022    Diabetic retinal exam  06/29/2022    Depression Screen  03/03/2023    Diabetic Alb to Cr ratio (uACR) test  08/16/2023    Lipids  08/16/2023    GFR test (Diabetes, CKD 3-4, OR last GFR 15-59)  08/16/2023    A1C test (Diabetic or Prediabetic)  11/23/2023    Annual Wellness Visit (AWV)  11/24/2023    DTaP/Tdap/Td vaccine (2 - Td or Tdap) 12/24/2024    Colorectal Cancer Screen  03/16/2027    Flu vaccine  Completed    Pneumococcal 65+ years Vaccine  Completed    Hepatitis C screen  Completed    Hepatitis A vaccine  Aged Out    Hib vaccine  Aged Out    Meningococcal (ACWY) vaccine  Aged Out    Diabetic foot exam  Discontinued

## 2023-03-13 NOTE — PROGRESS NOTES
Assessment/ Plan:   Sayda Lane was seen today for follow-up chronic condition and diabetes. Diagnoses and all orders for this visit:    Type II diabetes mellitus with nephropathy (HCC)-unable to tolerate Xigduo bec of the pill size so will shift him back to Metofrmin  -     AMB POC HEMOGLOBIN A1C  -     External Referral To Ophthalmology  -     metFORMIN (GLUCOPHAGE) 1000 MG tablet; Take 1 tablet by mouth 2 times daily (with meals)    Essential (primary) hypertension-controlled, continue with present meds  -     CBC; Future  -     Comprehensive Metabolic Panel; Future    Hyperlipidemia, unspecified hyperlipidemia type-goal LDL of less than 70 on Lipitor  -     Lipid Panel; Future    Hyperthyroidism-subclinical  -     TSH; Future    Vitamin D deficiency, unspecified-on rx    Smoking-will need follow up low dose lung CT    Hearing loss-not using his hearing aids    Follow-up and Dispositions    Return in about 3 months (around 6/13/2023) for follow up. Chief Complaint   Patient presents with    Follow-up Chronic Condition    Diabetes       Pt is a 67y.o. year old male who presents for follow up of his chronic medical problems    Health Maintenance Due   Topic Date Due    AAA screen  Never done    Shingles vaccine (2 of 3) 03/25/2016    COVID-19 Vaccine (4 - Booster for Moderna series) 01/04/2022    Diabetic retinal exam -needs referral downstairs with Dr Yoel Mejia 06/29/2022    Depression Screen  03/03/2023      Wt Readings from Last 3 Encounters:   03/13/23 162 lb (73.5 kg)   11/23/22 153 lb (69.4 kg)   08/23/22 162 lb 3.2 oz (73.6 kg)        BP Readings from Last 3 Encounters:   03/13/23 137/85   11/23/22 126/68   08/23/22 (!) 144/86    Took meds today? No  Home Bps- does not check but has a machine  Repeat BP-       Hemoglobin A1C   Date Value Ref Range Status   08/16/2022 8.2 (H) 4.2 - 5.6 % Final     Comment:     (NOTE)  HbA1C Interpretive Ranges  <5.7              Normal  5.7 - 6.4 Consider Prediabetes  >6.5              Consider Diabetes       Hemoglobin A1C, POC   Date Value Ref Range Status   03/13/2023 8.0 % Final    Karen [place]en[place] was too big for him so not taking it all the time-wants to get back on Metfromin that is round    Lab Results   Component Value Date/Time    CHOL 133 08/16/2022 01:10 PM    TRIG 115 08/16/2022 01:10 PM    HDL 38 08/16/2022 01:10 PM    LDLCALC 72 08/16/2022 01:10 PM    Forgets to take it per wife    Still smoking    Hard of hearing -does not like to use hearing aids  Was a   ROS:    Pt denies: Wt loss, Fever/Chills, HA, Visual changes, Fatigue, Chest pain, SOB, DYER, Abd pain, N/V/D/C, Blood in stool or urine, Edema. Pertinent positive as above in HPI. All others were negative    Patient Active Problem List   Diagnosis    Tubular adenoma of colon    Overweight (BMI 25.0-29. 9)    Smoking    Advanced directives, counseling/discussion    Hyperlipidemia    Essential (primary) hypertension    Type II diabetes mellitus with nephropathy (HCC)    Primary osteoarthritis of left knee    Subclinical hyperthyroidism    Hearing loss       Past Medical History:   Diagnosis Date    Hypertension        Current Outpatient Medications   Medication Sig Dispense Refill    metFORMIN (GLUCOPHAGE) 1000 MG tablet metformin 1,000 mg tablet             atorvastatin (LIPITOR) 10 MG tablet TAKE 1 TABLET EVERY DAY 90 tablet 1    amLODIPine (NORVASC) 10 MG tablet Take 10 mg by mouth daily      ergocalciferol (ERGOCALCIFEROL) 1.25 MG (22013 UT) capsule TAKE 1 CAPSULE BY MOUTH  EVERY 7 DAYS      losartan-hydroCHLOROthiazide (HYZAAR) 100-12.5 MG per tablet Take 1 tablet by mouth daily      pramoxine-hydrocortisone (ANALPRAM HC) 1-1 % cream Place rectally 2 times daily       No current facility-administered medications for this visit.        Social History     Tobacco Use   Smoking Status Light Smoker   Smokeless Tobacco Never       No Known Allergies    Patient Labs were reviewed: yes    Patient Past Records were reviewed: yes      Objective:     Vitals:    03/13/23 1046 03/13/23 1155   BP: (!) 142/79 137/85   Pulse: 98 94   Resp: 15    Weight: 162 lb (73.5 kg)    Height: 5' 6\" (1.676 m)      Body mass index is 26.15 kg/m². Exam:   Appearance: alert, well appearing,  oriented to person, place, and time, acyanotic, in no respiratory distress and well hydrated. HEENT:  NC/AT, pink conj, anicteric sclerae  Neck:  No cervical lymphadenopathy, no JVD, no thyromegaly, no carotid bruit  Heart:  RRR without M/R/G  Lungs:  CTAB, no rhonchi, rales, or wheezes with good air exchange   Abdomen:  Non-tender, pos bowel sounds, no hepatosplenomegaly  Ext:  No C/C/E    Skin: no rash  Neuro: no lateralizing signs, CNs II-XII intact            I have discussed the diagnosis with the patient and the intended plan as seen in the above orders. The patient has received an After-Visit Summary and questions were answered concerning future plans. Medication Side Effects and Warnings were discussed with patient: yes    Patient verbalized understanding of above instructions.     Gisela Seymour MD  Internal Medicine  [facility]

## 2023-03-14 LAB
ALBUMIN SERPL-MCNC: 4.4 G/DL (ref 3.7–4.7)
ALBUMIN/GLOB SERPL: 1.6 {RATIO} (ref 1.2–2.2)
ALP SERPL-CCNC: 110 IU/L (ref 44–121)
ALT SERPL-CCNC: 23 IU/L (ref 0–44)
AST SERPL-CCNC: 19 IU/L (ref 0–40)
BASOPHILS # BLD AUTO: 0 X10E3/UL (ref 0–0.2)
BASOPHILS NFR BLD AUTO: 1 %
BILIRUB SERPL-MCNC: 0.3 MG/DL (ref 0–1.2)
BUN SERPL-MCNC: 12 MG/DL (ref 8–27)
BUN/CREAT SERPL: 9 (ref 10–24)
CALCIUM SERPL-MCNC: 9.3 MG/DL (ref 8.6–10.2)
CHLORIDE SERPL-SCNC: 103 MMOL/L (ref 96–106)
CHOLEST SERPL-MCNC: 162 MG/DL (ref 100–199)
CO2 SERPL-SCNC: 20 MMOL/L (ref 20–29)
CREAT SERPL-MCNC: 1.32 MG/DL (ref 0.76–1.27)
EGFRCR SERPLBLD CKD-EPI 2021: 57 ML/MIN/1.73
EOSINOPHIL # BLD AUTO: 0.1 X10E3/UL (ref 0–0.4)
EOSINOPHIL NFR BLD AUTO: 2 %
ERYTHROCYTE [DISTWIDTH] IN BLOOD BY AUTOMATED COUNT: 13.2 % (ref 11.6–15.4)
GLOBULIN SER CALC-MCNC: 2.8 G/DL (ref 1.5–4.5)
GLUCOSE SERPL-MCNC: 207 MG/DL (ref 70–99)
HCT VFR BLD AUTO: 40.6 % (ref 37.5–51)
HDLC SERPL-MCNC: 33 MG/DL
HGB BLD-MCNC: 14.6 G/DL (ref 13–17.7)
IMM GRANULOCYTES # BLD AUTO: 0 X10E3/UL (ref 0–0.1)
IMM GRANULOCYTES NFR BLD AUTO: 0 %
LDLC SERPL CALC-MCNC: 99 MG/DL (ref 0–99)
LYMPHOCYTES # BLD AUTO: 1.7 X10E3/UL (ref 0.7–3.1)
LYMPHOCYTES NFR BLD AUTO: 24 %
MCH RBC QN AUTO: 33.3 PG (ref 26.6–33)
MCHC RBC AUTO-ENTMCNC: 36 G/DL (ref 31.5–35.7)
MCV RBC AUTO: 93 FL (ref 79–97)
MONOCYTES # BLD AUTO: 0.5 X10E3/UL (ref 0.1–0.9)
MONOCYTES NFR BLD AUTO: 7 %
NEUTROPHILS # BLD AUTO: 4.6 X10E3/UL (ref 1.4–7)
NEUTROPHILS NFR BLD AUTO: 66 %
PLATELET # BLD AUTO: 176 X10E3/UL (ref 150–450)
POTASSIUM SERPL-SCNC: 4.7 MMOL/L (ref 3.5–5.2)
PROT SERPL-MCNC: 7.2 G/DL (ref 6–8.5)
RBC # BLD AUTO: 4.38 X10E6/UL (ref 4.14–5.8)
SODIUM SERPL-SCNC: 139 MMOL/L (ref 134–144)
SPECIMEN STATUS REPORT: NORMAL
TRIGL SERPL-MCNC: 169 MG/DL (ref 0–149)
TSH SERPL DL<=0.005 MIU/L-ACNC: 0.86 UIU/ML (ref 0.45–4.5)
VLDLC SERPL CALC-MCNC: 30 MG/DL (ref 5–40)
WBC # BLD AUTO: 7 X10E3/UL (ref 3.4–10.8)

## 2023-04-26 RX ORDER — LOSARTAN POTASSIUM AND HYDROCHLOROTHIAZIDE 12.5; 1 MG/1; MG/1
TABLET ORAL
Qty: 90 TABLET | Refills: 1 | Status: SHIPPED | OUTPATIENT
Start: 2023-04-26

## 2023-05-01 RX ORDER — AMLODIPINE BESYLATE 10 MG/1
TABLET ORAL
Qty: 90 TABLET | Refills: 1 | Status: SHIPPED | OUTPATIENT
Start: 2023-05-01 | End: 2023-06-15

## 2023-05-08 ENCOUNTER — TELEPHONE (OUTPATIENT)
Facility: CLINIC | Age: 73
End: 2023-05-08

## 2023-05-10 ENCOUNTER — TELEPHONE (OUTPATIENT)
Facility: CLINIC | Age: 73
End: 2023-05-10

## 2023-05-10 NOTE — TELEPHONE ENCOUNTER
----- Message from [facility] sent at 5/9/2023 11:15 AM EDT -----  Subject: Message to Provider    QUESTIONS  Information for Provider? Nitesh Gomez from [facility] is needing to know if Dr. Claudette Pineda will follow pt for home health. They are seeing pt today and need   to know asap   ---------------------------------------------------------------------------  --------------  [id number]  [phone]; OK to leave message on voicemail  ---------------------------------------------------------------------------  --------------  SCRIPT ANSWERS  Relationship to Patient? Covered Entity  Covered Entity Type? Home Health Care?   Representative Name? Riri Rojas

## 2023-05-12 ENCOUNTER — TELEPHONE (OUTPATIENT)
Facility: CLINIC | Age: 73
End: 2023-05-12

## 2023-06-15 ENCOUNTER — OFFICE VISIT (OUTPATIENT)
Facility: CLINIC | Age: 73
End: 2023-06-15
Payer: MEDICARE

## 2023-06-15 VITALS
TEMPERATURE: 98.4 F | RESPIRATION RATE: 16 BRPM | DIASTOLIC BLOOD PRESSURE: 78 MMHG | HEIGHT: 66 IN | HEART RATE: 95 BPM | SYSTOLIC BLOOD PRESSURE: 116 MMHG | OXYGEN SATURATION: 99 % | BODY MASS INDEX: 21.21 KG/M2 | WEIGHT: 132 LBS

## 2023-06-15 DIAGNOSIS — I25.119 CORONARY ARTERY DISEASE INVOLVING NATIVE CORONARY ARTERY OF NATIVE HEART WITH ANGINA PECTORIS (HCC): Primary | ICD-10-CM

## 2023-06-15 DIAGNOSIS — E11.21 TYPE II DIABETES MELLITUS WITH NEPHROPATHY (HCC): ICD-10-CM

## 2023-06-15 DIAGNOSIS — E78.5 HYPERLIPIDEMIA, UNSPECIFIED HYPERLIPIDEMIA TYPE: ICD-10-CM

## 2023-06-15 DIAGNOSIS — I10 ESSENTIAL HYPERTENSION: ICD-10-CM

## 2023-06-15 DIAGNOSIS — E55.9 VITAMIN D DEFICIENCY, UNSPECIFIED: ICD-10-CM

## 2023-06-15 LAB — HBA1C MFR BLD: 5.7 %

## 2023-06-15 PROCEDURE — 3017F COLORECTAL CA SCREEN DOC REV: CPT | Performed by: INTERNAL MEDICINE

## 2023-06-15 PROCEDURE — 83036 HEMOGLOBIN GLYCOSYLATED A1C: CPT | Performed by: INTERNAL MEDICINE

## 2023-06-15 PROCEDURE — 3046F HEMOGLOBIN A1C LEVEL >9.0%: CPT | Performed by: INTERNAL MEDICINE

## 2023-06-15 PROCEDURE — 99214 OFFICE O/P EST MOD 30 MIN: CPT | Performed by: INTERNAL MEDICINE

## 2023-06-15 PROCEDURE — 3074F SYST BP LT 130 MM HG: CPT | Performed by: INTERNAL MEDICINE

## 2023-06-15 PROCEDURE — 2022F DILAT RTA XM EVC RTNOPTHY: CPT | Performed by: INTERNAL MEDICINE

## 2023-06-15 PROCEDURE — 1123F ACP DISCUSS/DSCN MKR DOCD: CPT | Performed by: INTERNAL MEDICINE

## 2023-06-15 PROCEDURE — G8420 CALC BMI NORM PARAMETERS: HCPCS | Performed by: INTERNAL MEDICINE

## 2023-06-15 PROCEDURE — 1036F TOBACCO NON-USER: CPT | Performed by: INTERNAL MEDICINE

## 2023-06-15 PROCEDURE — G8427 DOCREV CUR MEDS BY ELIG CLIN: HCPCS | Performed by: INTERNAL MEDICINE

## 2023-06-15 PROCEDURE — 3078F DIAST BP <80 MM HG: CPT | Performed by: INTERNAL MEDICINE

## 2023-06-15 RX ORDER — HYDROCHLOROTHIAZIDE 12.5 MG/1
12.5 CAPSULE, GELATIN COATED ORAL DAILY
Qty: 90 CAPSULE | Refills: 1 | Status: SHIPPED | OUTPATIENT
Start: 2023-06-15

## 2023-06-15 RX ORDER — ASPIRIN 81 MG/1
81 TABLET ORAL DAILY
COMMUNITY
End: 2023-06-15 | Stop reason: SDUPTHER

## 2023-06-15 RX ORDER — METOPROLOL SUCCINATE 50 MG/1
50 TABLET, EXTENDED RELEASE ORAL DAILY
Qty: 90 TABLET | Refills: 1 | Status: SHIPPED | OUTPATIENT
Start: 2023-06-15

## 2023-06-15 RX ORDER — ASPIRIN 81 MG/1
81 TABLET ORAL DAILY
Qty: 90 TABLET | Refills: 1 | Status: SHIPPED | OUTPATIENT
Start: 2023-06-15

## 2023-06-15 RX ORDER — CLOPIDOGREL BISULFATE 75 MG/1
75 TABLET ORAL DAILY
Qty: 90 TABLET | Refills: 1 | Status: SHIPPED | OUTPATIENT
Start: 2023-06-15

## 2023-06-15 RX ORDER — ATORVASTATIN CALCIUM 80 MG/1
80 TABLET, FILM COATED ORAL DAILY
Qty: 90 TABLET | Refills: 1 | Status: SHIPPED | OUTPATIENT
Start: 2023-06-15

## 2023-06-15 RX ORDER — VALSARTAN 40 MG/1
40 TABLET ORAL DAILY
Qty: 90 TABLET | Refills: 1 | Status: SHIPPED | OUTPATIENT
Start: 2023-06-15

## 2023-06-29 PROBLEM — I25.119 CORONARY ARTERY DISEASE INVOLVING NATIVE CORONARY ARTERY OF NATIVE HEART WITH ANGINA PECTORIS (HCC): Status: ACTIVE | Noted: 2023-06-29

## 2023-07-05 ENCOUNTER — OFFICE VISIT (OUTPATIENT)
Facility: CLINIC | Age: 73
End: 2023-07-05
Payer: MEDICARE

## 2023-07-05 VITALS
BODY MASS INDEX: 22.18 KG/M2 | HEIGHT: 66 IN | SYSTOLIC BLOOD PRESSURE: 139 MMHG | HEART RATE: 82 BPM | TEMPERATURE: 98.4 F | WEIGHT: 138 LBS | OXYGEN SATURATION: 98 % | RESPIRATION RATE: 16 BRPM | DIASTOLIC BLOOD PRESSURE: 86 MMHG

## 2023-07-05 DIAGNOSIS — I10 ESSENTIAL HYPERTENSION: ICD-10-CM

## 2023-07-05 DIAGNOSIS — T81.49XA WOUND INFECTION AFTER SURGERY: Primary | ICD-10-CM

## 2023-07-05 DIAGNOSIS — E78.5 HYPERLIPIDEMIA, UNSPECIFIED HYPERLIPIDEMIA TYPE: ICD-10-CM

## 2023-07-05 DIAGNOSIS — I25.119 CORONARY ARTERY DISEASE INVOLVING NATIVE CORONARY ARTERY OF NATIVE HEART WITH ANGINA PECTORIS (HCC): ICD-10-CM

## 2023-07-05 DIAGNOSIS — E11.21 TYPE II DIABETES MELLITUS WITH NEPHROPATHY (HCC): ICD-10-CM

## 2023-07-05 PROCEDURE — 3075F SYST BP GE 130 - 139MM HG: CPT | Performed by: INTERNAL MEDICINE

## 2023-07-05 PROCEDURE — G8420 CALC BMI NORM PARAMETERS: HCPCS | Performed by: INTERNAL MEDICINE

## 2023-07-05 PROCEDURE — 1123F ACP DISCUSS/DSCN MKR DOCD: CPT | Performed by: INTERNAL MEDICINE

## 2023-07-05 PROCEDURE — 3046F HEMOGLOBIN A1C LEVEL >9.0%: CPT | Performed by: INTERNAL MEDICINE

## 2023-07-05 PROCEDURE — 1036F TOBACCO NON-USER: CPT | Performed by: INTERNAL MEDICINE

## 2023-07-05 PROCEDURE — 2022F DILAT RTA XM EVC RTNOPTHY: CPT | Performed by: INTERNAL MEDICINE

## 2023-07-05 PROCEDURE — 3017F COLORECTAL CA SCREEN DOC REV: CPT | Performed by: INTERNAL MEDICINE

## 2023-07-05 PROCEDURE — G8427 DOCREV CUR MEDS BY ELIG CLIN: HCPCS | Performed by: INTERNAL MEDICINE

## 2023-07-05 PROCEDURE — 3079F DIAST BP 80-89 MM HG: CPT | Performed by: INTERNAL MEDICINE

## 2023-07-05 PROCEDURE — 99214 OFFICE O/P EST MOD 30 MIN: CPT | Performed by: INTERNAL MEDICINE

## 2023-07-05 RX ORDER — DOXYCYCLINE HYCLATE 100 MG
100 TABLET ORAL 2 TIMES DAILY
Qty: 14 TABLET | Refills: 0 | Status: SHIPPED | OUTPATIENT
Start: 2023-07-05 | End: 2023-07-12

## 2023-07-05 RX ORDER — CHOLECALCIFEROL (VITAMIN D3) 1250 MCG
CAPSULE ORAL
COMMUNITY

## 2023-07-05 RX ORDER — AMLODIPINE BESYLATE 10 MG/1
10 TABLET ORAL DAILY
COMMUNITY
End: 2023-07-05 | Stop reason: ALTCHOICE

## 2023-07-05 RX ORDER — LOSARTAN POTASSIUM AND HYDROCHLOROTHIAZIDE 12.5; 1 MG/1; MG/1
1 TABLET ORAL DAILY
COMMUNITY
End: 2023-07-05 | Stop reason: ALTCHOICE

## 2023-07-05 RX ORDER — VALSARTAN 40 MG/1
80 TABLET ORAL DAILY
Qty: 90 TABLET | Refills: 1 | Status: SHIPPED
Start: 2023-07-05

## 2023-07-05 SDOH — ECONOMIC STABILITY: HOUSING INSECURITY
IN THE LAST 12 MONTHS, WAS THERE A TIME WHEN YOU DID NOT HAVE A STEADY PLACE TO SLEEP OR SLEPT IN A SHELTER (INCLUDING NOW)?: NO

## 2023-07-05 SDOH — ECONOMIC STABILITY: FOOD INSECURITY: WITHIN THE PAST 12 MONTHS, THE FOOD YOU BOUGHT JUST DIDN'T LAST AND YOU DIDN'T HAVE MONEY TO GET MORE.: NEVER TRUE

## 2023-07-05 SDOH — ECONOMIC STABILITY: INCOME INSECURITY: HOW HARD IS IT FOR YOU TO PAY FOR THE VERY BASICS LIKE FOOD, HOUSING, MEDICAL CARE, AND HEATING?: NOT HARD AT ALL

## 2023-07-05 SDOH — ECONOMIC STABILITY: FOOD INSECURITY: WITHIN THE PAST 12 MONTHS, YOU WORRIED THAT YOUR FOOD WOULD RUN OUT BEFORE YOU GOT MONEY TO BUY MORE.: NEVER TRUE

## 2023-07-05 ASSESSMENT — PATIENT HEALTH QUESTIONNAIRE - PHQ9
SUM OF ALL RESPONSES TO PHQ QUESTIONS 1-9: 0
2. FEELING DOWN, DEPRESSED OR HOPELESS: 0
1. LITTLE INTEREST OR PLEASURE IN DOING THINGS: 0
SUM OF ALL RESPONSES TO PHQ QUESTIONS 1-9: 0
SUM OF ALL RESPONSES TO PHQ9 QUESTIONS 1 & 2: 0

## 2023-07-05 NOTE — PROGRESS NOTES
Assessment/ Plan:   Patricia Munoz was seen today for diabetes, hypertension and skin problem. Diagnoses and all orders for this visit:    Wound infection after surgery-mild dehiscence of the graft site on the left lower leg  -     doxycycline hyclate (VIBRA-TABS) 100 MG tablet; Take 1 tablet by mouth 2 times daily for 7 days    Coronary artery disease involving native coronary artery of native heart with angina pectoris (HCC)-will start cardiac rehab soon    Type II diabetes mellitus with nephropathy (HCC)-will consider adding Jardiance next visit for sec prevention of MI    Hyperlipidemia, unspecified hyperlipidemia type-goal LDL of less than 70 on high dose LIpitor    Essential hypertension-controlled, Diovan inc to 80 mg by Cardio recently  -     valsartan (DIOVAN) 40 MG tablet; Take 2 tablets by mouth daily        Follow-up and Dispositions    Return for previous appt.                      Chief Complaint   Patient presents with    Diabetes    Hypertension    Skin Problem     Left leg rash       Pt is a 67y.o. year old male who presents for follow up of his chronic medical problems    Health Maintenance Due   Topic Date Due    AAA screen  Never done    Shingles vaccine (2 of 3) 03/25/2016    COVID-19 Vaccine (4 - Booster for Moderna series) 01/04/2022    Diabetic retinal exam  06/29/2022      Wt Readings from Last 3 Encounters:   07/05/23 138 lb (62.6 kg)   06/15/23 132 lb (59.9 kg)   06/08/23 162 lb (73.5 kg)        BP Readings from Last 3 Encounters:   07/05/23 139/86   06/15/23 116/78   03/13/23 137/85      Hemoglobin A1C   Date Value Ref Range Status   08/16/2022 8.2 (H) 4.2 - 5.6 % Final     Comment:     (NOTE)  HbA1C Interpretive Ranges  <5.7              Normal  5.7 - 6.4         Consider Prediabetes  >6.5              Consider Diabetes       Hemoglobin A1C, POC   Date Value Ref Range Status   06/15/2023 5.7 % Final        Here for an acute visit for left leg rash-area where the vein was harvested for the

## 2023-07-05 NOTE — PROGRESS NOTES
Fabricio oMrfin presents today for   Chief Complaint   Patient presents with    Diabetes    Hypertension    Skin Problem     Left leg rash       Is someone accompanying this pt? Yes    Is the patient using any DME equipment during OV? Yes    Depression Screening:  PHQ-9  7/5/2023   Little interest or pleasure in doing things 0   Little interest or pleasure in doing things -   Feeling down, depressed, or hopeless 0   PHQ-2 Score 0   Total Score PHQ 2 -   PHQ-9 Total Score 0               Health Maintenance reviewed and discussed and ordered per Provider. Health Maintenance Due   Topic Date Due    AAA screen  Never done    Shingles vaccine (2 of 3) 03/25/2016    COVID-19 Vaccine (4 - Booster for Eyad Seat series) 01/04/2022    Diabetic retinal exam  06/29/2022   .        1. \"Have you been to the ER, urgent care clinic since your last visit? Hospitalized since your last visit? \" No    2. \"Have you seen or consulted any other health care providers outside of the [address] [place] [address] since your last visit? \" No    3. For patients over 45: Has the patient had a colonoscopy? Yes - no Care Gap present     If the patient is female:    4. For patients over 40: Has the patient had a mammogram? No    5. For patients over 21: Has the patient had a pap smear?  NA - based on age or sex

## 2023-09-19 ENCOUNTER — OFFICE VISIT (OUTPATIENT)
Facility: CLINIC | Age: 73
End: 2023-09-19
Payer: MEDICARE

## 2023-09-19 VITALS
HEIGHT: 66 IN | TEMPERATURE: 98.2 F | OXYGEN SATURATION: 98 % | SYSTOLIC BLOOD PRESSURE: 159 MMHG | DIASTOLIC BLOOD PRESSURE: 86 MMHG | HEART RATE: 67 BPM | WEIGHT: 138 LBS | RESPIRATION RATE: 18 BRPM | BODY MASS INDEX: 22.18 KG/M2

## 2023-09-19 DIAGNOSIS — E55.9 VITAMIN D DEFICIENCY, UNSPECIFIED: ICD-10-CM

## 2023-09-19 DIAGNOSIS — I10 ESSENTIAL HYPERTENSION: Primary | ICD-10-CM

## 2023-09-19 DIAGNOSIS — E11.21 TYPE II DIABETES MELLITUS WITH NEPHROPATHY (HCC): ICD-10-CM

## 2023-09-19 DIAGNOSIS — E05.90 THYROTOXICOSIS WITHOUT THYROID STORM, UNSPECIFIED THYROTOXICOSIS TYPE: ICD-10-CM

## 2023-09-19 DIAGNOSIS — I25.119 CORONARY ARTERY DISEASE INVOLVING NATIVE CORONARY ARTERY OF NATIVE HEART WITH ANGINA PECTORIS (HCC): ICD-10-CM

## 2023-09-19 DIAGNOSIS — Z23 ENCOUNTER FOR IMMUNIZATION: ICD-10-CM

## 2023-09-19 DIAGNOSIS — E78.5 HYPERLIPIDEMIA, UNSPECIFIED HYPERLIPIDEMIA TYPE: ICD-10-CM

## 2023-09-19 PROCEDURE — G8420 CALC BMI NORM PARAMETERS: HCPCS | Performed by: INTERNAL MEDICINE

## 2023-09-19 PROCEDURE — 3074F SYST BP LT 130 MM HG: CPT | Performed by: INTERNAL MEDICINE

## 2023-09-19 PROCEDURE — G8427 DOCREV CUR MEDS BY ELIG CLIN: HCPCS | Performed by: INTERNAL MEDICINE

## 2023-09-19 PROCEDURE — G0008 ADMIN INFLUENZA VIRUS VAC: HCPCS | Performed by: INTERNAL MEDICINE

## 2023-09-19 PROCEDURE — 99214 OFFICE O/P EST MOD 30 MIN: CPT | Performed by: INTERNAL MEDICINE

## 2023-09-19 PROCEDURE — 1123F ACP DISCUSS/DSCN MKR DOCD: CPT | Performed by: INTERNAL MEDICINE

## 2023-09-19 PROCEDURE — 3044F HG A1C LEVEL LT 7.0%: CPT | Performed by: INTERNAL MEDICINE

## 2023-09-19 PROCEDURE — 90694 VACC AIIV4 NO PRSRV 0.5ML IM: CPT | Performed by: INTERNAL MEDICINE

## 2023-09-19 PROCEDURE — 1036F TOBACCO NON-USER: CPT | Performed by: INTERNAL MEDICINE

## 2023-09-19 PROCEDURE — 2022F DILAT RTA XM EVC RTNOPTHY: CPT | Performed by: INTERNAL MEDICINE

## 2023-09-19 PROCEDURE — 3078F DIAST BP <80 MM HG: CPT | Performed by: INTERNAL MEDICINE

## 2023-09-19 PROCEDURE — 3017F COLORECTAL CA SCREEN DOC REV: CPT | Performed by: INTERNAL MEDICINE

## 2023-09-19 RX ORDER — ROSUVASTATIN CALCIUM 20 MG/1
20 TABLET, COATED ORAL DAILY
Qty: 90 TABLET | Refills: 1 | Status: SHIPPED | OUTPATIENT
Start: 2023-09-19

## 2023-09-19 ASSESSMENT — PATIENT HEALTH QUESTIONNAIRE - PHQ9
SUM OF ALL RESPONSES TO PHQ QUESTIONS 1-9: 0
1. LITTLE INTEREST OR PLEASURE IN DOING THINGS: 0
SUM OF ALL RESPONSES TO PHQ9 QUESTIONS 1 & 2: 0
2. FEELING DOWN, DEPRESSED OR HOPELESS: 0
SUM OF ALL RESPONSES TO PHQ QUESTIONS 1-9: 0

## 2023-09-19 NOTE — PROGRESS NOTES
[address] had concerns including Follow-up. for today's visit . When asked if patient has any concerns he would like to address with Dr. Eze Clark . MICHAEL.SANJEEV Hodge has been notified  of patient concerns . Did patient bring someone? NO     Did the patient have DME equipment? Yes     Did you take your medication today? Yes       1. \"Have you been to the ER, urgent care clinic since your last visit? Hospitalized since your last visit? \" . NO    2. \"Have you seen or consulted any other health care providers outside of the [address] [place] [address] since your last visit? \" No     3. For patients aged 43-73: Has the patient had a colonoscopy / FIT/ Cologuard? Yes      If the patient is female:    4. For patients aged 43-66: Has the patient had a mammogram within the past 2 years? N.A      5. For patients aged 21-65: Has the patient had a pap smear? {Cancer Care Gap present? N/A        9/19/2023    10:38 AM   Amb Fall Risk Assessment and TUG Test   Do you feel unsteady or are you worried about falling?  no   2 or more falls in past year? no   Fall with injury in past year?  no              9/19/2023    10:38 AM   PHQ-9    Little interest or pleasure in doing things 0   Feeling down, depressed, or hopeless 0   PHQ-2 Score 0   PHQ-9 Total Score 0          Health Maintenance Due   Topic Date Due    Hepatitis B vaccine (1 of 3 - Risk 3-dose series) Never done    AAA screen  Never done    Shingles vaccine (2 of 3) 03/25/2016    COVID-19 Vaccine (4 - Moderna series) 01/04/2022    Diabetic retinal exam  06/29/2022    Flu vaccine (1) 08/01/2023    Diabetic Alb to Cr ratio (uACR) test  08/16/2023

## 2023-09-19 NOTE — PROGRESS NOTES
Assessment/ Plan:   Bola Zhong was seen today for follow-up. Diagnoses and all orders for this visit:    Essential hypertension-uncontrolled here but apparently always normal at cardiac rehab; advised home monitoring and bring list next visit  -     CBC; Future  -     Comprehensive Metabolic Panel; Future    Coronary artery disease involving native coronary artery of native heart with angina pectoris (HCC)-s/p CABG, currently undergoing cardiac rehab    Type II diabetes mellitus with nephropathy (HCC)-goal A1c of less than 7%; will try to get his pharmacy to send round pills as he is unable to swallow the large oblong pills  -     Hemoglobin A1C; Future  -     Microalbumin / Creatinine Urine Ratio; Future  -     metFORMIN (GLUCOPHAGE) 1000 MG tablet; Take 1 tablet by mouth 2 times daily (with meals)    Hyperlipidemia, unspecified hyperlipidemia type-also unable to swallow the large Lipitor pills so carmen change to Crestor  -     Lipid Panel; Future  -     rosuvastatin (CRESTOR) 20 MG tablet; Take 1 tablet by mouth daily    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                      Chief Complaint   Patient presents with    Follow-up       Pt is a 67y.o. year old male who presents for follow up of his chronic medical problems    Health Maintenance Due   Topic Date Due    Hepatitis B vaccine (1 of 3 - Risk 3-dose series) Never done    AAA screen  Never done    Shingles vaccine (2 of 3) 03/25/2016    COVID-19 Vaccine (4 - Moderna series) 01/04/2022    Diabetic retinal exam  06/29/2022

## 2023-09-19 NOTE — PROGRESS NOTES
Patient presents to office for Flu Vaccine injection. Injection ordered per smart set and pended  for Ignacio Bautista to sign. After obtaining signed consent from patient area was prepped and injection given IM. No signs nor symptoms of adverse reaction noted. Patient tolerated injection well.

## 2023-09-20 LAB
25(OH)D3+25(OH)D2 SERPL-MCNC: 31 NG/ML (ref 30–100)
ALBUMIN SERPL-MCNC: 4.5 G/DL (ref 3.8–4.8)
ALBUMIN/GLOB SERPL: 1.7 {RATIO} (ref 1.2–2.2)
ALP SERPL-CCNC: 84 IU/L (ref 44–121)
ALT SERPL-CCNC: 11 IU/L (ref 0–44)
AST SERPL-CCNC: 13 IU/L (ref 0–40)
BASOPHILS # BLD AUTO: 0.1 X10E3/UL (ref 0–0.2)
BASOPHILS NFR BLD AUTO: 1 %
BILIRUB SERPL-MCNC: 0.4 MG/DL (ref 0–1.2)
BUN SERPL-MCNC: 31 MG/DL (ref 8–27)
BUN/CREAT SERPL: 13 (ref 10–24)
CALCIUM SERPL-MCNC: 9.3 MG/DL (ref 8.6–10.2)
CHLORIDE SERPL-SCNC: 101 MMOL/L (ref 96–106)
CHOLEST SERPL-MCNC: 104 MG/DL (ref 100–199)
CO2 SERPL-SCNC: 19 MMOL/L (ref 20–29)
CREAT SERPL-MCNC: 2.45 MG/DL (ref 0.76–1.27)
EGFRCR SERPLBLD CKD-EPI 2021: 27 ML/MIN/1.73
EOSINOPHIL # BLD AUTO: 0.3 X10E3/UL (ref 0–0.4)
EOSINOPHIL NFR BLD AUTO: 4 %
ERYTHROCYTE [DISTWIDTH] IN BLOOD BY AUTOMATED COUNT: 13.4 % (ref 11.6–15.4)
GLOBULIN SER CALC-MCNC: 2.6 G/DL (ref 1.5–4.5)
GLUCOSE SERPL-MCNC: 97 MG/DL (ref 70–99)
HBA1C MFR BLD: 5.7 % (ref 4.8–5.6)
HCT VFR BLD AUTO: 36.5 % (ref 37.5–51)
HDLC SERPL-MCNC: 49 MG/DL
HGB BLD-MCNC: 12.1 G/DL (ref 13–17.7)
IMM GRANULOCYTES # BLD AUTO: 0 X10E3/UL (ref 0–0.1)
IMM GRANULOCYTES NFR BLD AUTO: 0 %
LDLC SERPL CALC-MCNC: 41 MG/DL (ref 0–99)
LYMPHOCYTES # BLD AUTO: 1.4 X10E3/UL (ref 0.7–3.1)
LYMPHOCYTES NFR BLD AUTO: 21 %
MCH RBC QN AUTO: 30.9 PG (ref 26.6–33)
MCHC RBC AUTO-ENTMCNC: 33.2 G/DL (ref 31.5–35.7)
MCV RBC AUTO: 93 FL (ref 79–97)
MONOCYTES # BLD AUTO: 0.6 X10E3/UL (ref 0.1–0.9)
MONOCYTES NFR BLD AUTO: 9 %
NEUTROPHILS # BLD AUTO: 4.3 X10E3/UL (ref 1.4–7)
NEUTROPHILS NFR BLD AUTO: 65 %
PLATELET # BLD AUTO: 177 X10E3/UL (ref 150–450)
POTASSIUM SERPL-SCNC: 4.8 MMOL/L (ref 3.5–5.2)
PROT SERPL-MCNC: 7.1 G/DL (ref 6–8.5)
RBC # BLD AUTO: 3.91 X10E6/UL (ref 4.14–5.8)
SODIUM SERPL-SCNC: 138 MMOL/L (ref 134–144)
SPECIMEN STATUS REPORT: NORMAL
T3FREE SERPL-MCNC: 2.8 PG/ML (ref 2–4.4)
T4 FREE SERPL-MCNC: 1.84 NG/DL (ref 0.82–1.77)
TRIGL SERPL-MCNC: 65 MG/DL (ref 0–149)
TSH SERPL DL<=0.005 MIU/L-ACNC: 1.12 UIU/ML (ref 0.45–4.5)
VLDLC SERPL CALC-MCNC: 14 MG/DL (ref 5–40)
WBC # BLD AUTO: 6.5 X10E3/UL (ref 3.4–10.8)

## 2023-09-21 LAB
ALBUMIN/CREAT UR: 8 MG/G CREAT (ref 0–29)
CREAT UR-MCNC: 87.6 MG/DL
MICROALBUMIN UR-MCNC: 6.7 UG/ML
SPECIMEN STATUS REPORT: NORMAL

## 2023-10-02 DIAGNOSIS — E78.5 HYPERLIPIDEMIA, UNSPECIFIED HYPERLIPIDEMIA TYPE: ICD-10-CM

## 2023-10-02 DIAGNOSIS — I10 ESSENTIAL HYPERTENSION: ICD-10-CM

## 2023-10-02 RX ORDER — ATORVASTATIN CALCIUM 80 MG/1
80 TABLET, FILM COATED ORAL DAILY
Qty: 90 TABLET | Refills: 1 | Status: SHIPPED | OUTPATIENT
Start: 2023-10-02

## 2023-10-02 RX ORDER — VALSARTAN 40 MG/1
80 TABLET ORAL DAILY
Qty: 90 TABLET | Refills: 1 | Status: SHIPPED | OUTPATIENT
Start: 2023-10-02

## 2023-10-16 ENCOUNTER — CARE COORDINATION (OUTPATIENT)
Facility: CLINIC | Age: 73
End: 2023-10-16

## 2023-10-16 NOTE — CARE COORDINATION
Care Transitions Initial Follow Up Call    Call within 2 business days of discharge: Yes    Care Transitions Outreach Attempt    CTN Attempted to reach patient for transitions of care follow up. Unable to reach patient. Left a voice message with office contact information. No Pt. Medical/health information left on message. Patient: Alana Thomas Patient : 1950 MRN: [id number]    Was this an external facility discharge? Yes, 10/11/23-10/15/23  Discharge Facility Name: [facility] for Acute hypoxic Respiratory failure due to covid Pneumonia. Noted following upcoming appointments from discharge chart review:   [address] [facility],[address] follow up appointment(s):   Future Appointments   Date Time Provider [address]   2024 10:15 AM MD PEREZ Medina BS [facility]     Non-BS  follow up appointment(s): none noted at this time.        Amara Welch RN

## 2023-10-17 ENCOUNTER — CARE COORDINATION (OUTPATIENT)
Facility: CLINIC | Age: 73
End: 2023-10-17

## 2023-10-17 NOTE — CARE COORDINATION
Care Transitions Initial Follow Up Call    Call within 2 business days of discharge: Yes    Care Transitions Outreach Attempt    Multiple attempts to reach patient for transitions of care follow up. Unable to reach patient. Left a voice message with office contact information. No Pt. Medical/health information left on message. I have been unable to reach Patient on phone. This episode is closed and resolved. Patient: Floresita Farrell Patient : 1950 MRN: [id number]    Was this an external facility discharge? Yes, 10/11/23-10/15/23  Discharge Facility Name: [facility] for Acute hypoxic Respiratory failure due to covid Pneumonia. Noted following upcoming appointments from discharge chart review:   [facility] follow up appointment(s):   Future Appointments   Date Time Provider [address] [place] [address]   2024 10:15 AM Thea Baumgarten, MD AMA BS AMB     Non-[facility]  follow up appointment(s): none noted at this time.        Yandel Anand RN

## 2023-10-17 NOTE — CARE COORDINATION
Care Transitions Initial Follow Up Call    Call within 2 business days of discharge: Yes    Care Transitions Outreach Attempt    CTN received a missed call from Mrs. Oscar Crouch     CTN attempt to reach patient/ family  for transitions of care follow up. Unable to reach patient. Left a voice message with office contact information. No Pt. Medical/health information left on message. Patient: Gonzales Porter Patient : 1950 MRN: [id number]    Was this an external facility discharge? Yes, 10/11/23-10/15/23  Discharge Facility Name: [facility] for Acute hypoxic Respiratory failure due to covid Pneumonia. Noted following upcoming appointments from discharge chart review:   [facility] follow up appointment(s):   Future Appointments   Date Time Provider [address]   2024 10:15 AM Lazarus Gonzales MD AMA BS AMB     Non-[facility]  follow up appointment(s): none noted at this time.        Marcelle Granados RN

## 2023-10-23 ENCOUNTER — OFFICE VISIT (OUTPATIENT)
Facility: CLINIC | Age: 73
End: 2023-10-23

## 2023-10-23 VITALS
SYSTOLIC BLOOD PRESSURE: 161 MMHG | TEMPERATURE: 99.6 F | RESPIRATION RATE: 16 BRPM | BODY MASS INDEX: 22.18 KG/M2 | WEIGHT: 138 LBS | DIASTOLIC BLOOD PRESSURE: 97 MMHG | HEART RATE: 106 BPM | HEIGHT: 66 IN | OXYGEN SATURATION: 97 %

## 2023-10-23 DIAGNOSIS — N18.4 STAGE 4 CHRONIC KIDNEY DISEASE (HCC): ICD-10-CM

## 2023-10-23 DIAGNOSIS — Z09 HOSPITAL DISCHARGE FOLLOW-UP: Primary | ICD-10-CM

## 2023-10-23 DIAGNOSIS — E11.21 TYPE II DIABETES MELLITUS WITH NEPHROPATHY (HCC): ICD-10-CM

## 2023-10-23 DIAGNOSIS — J12.82 PNEUMONIA DUE TO COVID-19 VIRUS: ICD-10-CM

## 2023-10-23 DIAGNOSIS — M10.9 ACUTE GOUT OF RIGHT FOOT, UNSPECIFIED CAUSE: ICD-10-CM

## 2023-10-23 DIAGNOSIS — I10 ESSENTIAL HYPERTENSION: ICD-10-CM

## 2023-10-23 DIAGNOSIS — U07.1 PNEUMONIA DUE TO COVID-19 VIRUS: ICD-10-CM

## 2023-10-23 RX ORDER — BUMETANIDE 1 MG/1
1 TABLET ORAL DAILY
COMMUNITY

## 2023-10-23 RX ORDER — DEXAMETHASONE 2 MG/1
6 TABLET ORAL
COMMUNITY

## 2023-10-23 RX ORDER — COLCHICINE 0.6 MG/1
TABLET ORAL
Qty: 30 TABLET | Refills: 0 | Status: SHIPPED | OUTPATIENT
Start: 2023-10-23

## 2023-10-23 RX ORDER — AMLODIPINE BESYLATE 5 MG/1
5 TABLET ORAL DAILY
COMMUNITY

## 2023-10-23 ASSESSMENT — PATIENT HEALTH QUESTIONNAIRE - PHQ9
SUM OF ALL RESPONSES TO PHQ QUESTIONS 1-9: 0
2. FEELING DOWN, DEPRESSED OR HOPELESS: 0
SUM OF ALL RESPONSES TO PHQ QUESTIONS 1-9: 0
SUM OF ALL RESPONSES TO PHQ QUESTIONS 1-9: 0
SUM OF ALL RESPONSES TO PHQ9 QUESTIONS 1 & 2: 0
SUM OF ALL RESPONSES TO PHQ QUESTIONS 1-9: 0
1. LITTLE INTEREST OR PLEASURE IN DOING THINGS: 0

## 2023-10-23 NOTE — PROGRESS NOTES
1. \"Have you been to the ER, urgent care clinic since your last visit? Hospitalized since your last visit? \" Yes [facility] 10-11-23 to 10-15-23  Dx: Hypoxia    2. \"Have you seen or consulted any other health care providers outside of the [address] [place] [address] since your last visit? \" No    3. For patients aged 43-73: Has the patient had a colonoscopy / FIT/ Cologuard? Yes      If the patient is female:    4. For patients aged 43-66: Has the patient had a mammogram within the past 2 years? NA - based on age or sex    11. For patients aged 21-65: Has the patient had a pap smear?  NA - based on age or sex    Health Maintenance Due   Topic Date Due    Hepatitis B vaccine (1 of 3 - Risk 3-dose series) Never done    Shingles vaccine (2 of 3) 03/25/2016    COVID-19 Vaccine (4 - Moderna series) 01/04/2022    Diabetic retinal exam  06/29/2022

## 2023-10-23 NOTE — PROGRESS NOTES
Post-Discharge Transitional Care Management Progress Note      Esteban Powell   YOB: 1950    Date of Office Visit:  10/23/2023  Date of Hospital Admission: 10/11/2023  Date of Hospital Discharge: 10/15/2023    Care management risk score Rising risk (score 2-5) and Complex Care (Scores >=6): No Risk Score On File     Non face to face  following discharge, date last encounter closed (first attempt may have been earlier): 10/17/2023 10/17/2023    Call initiated 2 business days of discharge: Yes    ASSESSMENT/PLAN:   Hospital discharge bmnify-nl-mlcgcsukx meds discussed with wife and patient  -     MI DISCHARGE MEDS RECONCILED W/ CURRENT OUTPATIENT MED LIST  Pneumonia due to COVID-19 virus-will need follow up CXR in 1 month    Acute gout of right foot, unspecified cause-new sx since hospital discharge; likely from CKD, diuretics; discussed low purine diet   -     Uric Acid; Future  -     colchicine (COLCRYS) 0.6 MG tablet; Take 2 po now and 1 po an hour later if symptoms persist then 1/2 tab po daily thereafter, Disp-30 tablet, R-0Normal    Stage 4 chronic kidney disease (HCC)-new finding as well; cannot take NSAIDs for the above; wife given info to follow up with Dr Ashley Angeles who saw patient in the [address] Metabolic Panel; Future    Essential hypertension-uncontrolled, likely pain related; patient was in severe pain from the swelling of the right foot    Type II diabetes mellitus with nephropathy (HCC)-off Metformin bec of CKD; started on Jardiance during recent hospitalization      Medical Decision Making: moderate complexity  Return in 1 month (on 11/23/2023) for follow up. Given info re kidney doctor appt and cardio appt Nov 1 for Echo    Subjective:   HPI:  Follow up of Hospital problems/diagnosis(es): dx with Covid-hypoxia  Acute on chronic renal failure  Elev BP  Inpatient course: Discharge summary reviewed- see chart.   Discharge Medication List       START taking these

## 2023-10-24 LAB
BUN SERPL-MCNC: 51 MG/DL (ref 8–27)
BUN/CREAT SERPL: 18 (ref 10–24)
CHLORIDE SERPL-SCNC: 96 MMOL/L (ref 96–106)
CO2 SERPL-SCNC: 23 MMOL/L (ref 20–29)
CREAT SERPL-MCNC: 2.87 MG/DL (ref 0.76–1.27)
EGFRCR SERPLBLD CKD-EPI 2021: 22 ML/MIN/1.73
GLUCOSE SERPL-MCNC: 156 MG/DL (ref 70–99)
POTASSIUM SERPL-SCNC: 5.3 MMOL/L (ref 3.5–5.2)
SODIUM SERPL-SCNC: 140 MMOL/L (ref 134–144)
URATE SERPL-MCNC: 8.8 MG/DL (ref 3.8–8.4)

## 2023-11-02 ENCOUNTER — OFFICE VISIT (OUTPATIENT)
Facility: CLINIC | Age: 73
End: 2023-11-02
Payer: MEDICARE

## 2023-11-02 VITALS
HEIGHT: 66 IN | HEART RATE: 79 BPM | BODY MASS INDEX: 22.18 KG/M2 | TEMPERATURE: 98.6 F | OXYGEN SATURATION: 98 % | RESPIRATION RATE: 16 BRPM | DIASTOLIC BLOOD PRESSURE: 70 MMHG | WEIGHT: 138 LBS | SYSTOLIC BLOOD PRESSURE: 116 MMHG

## 2023-11-02 DIAGNOSIS — I10 ESSENTIAL HYPERTENSION: ICD-10-CM

## 2023-11-02 DIAGNOSIS — N18.4 STAGE 4 CHRONIC KIDNEY DISEASE (HCC): ICD-10-CM

## 2023-11-02 DIAGNOSIS — S01.01XD LACERATION OF SCALP, SUBSEQUENT ENCOUNTER: ICD-10-CM

## 2023-11-02 DIAGNOSIS — M10.9 ACUTE GOUT OF RIGHT FOOT, UNSPECIFIED CAUSE: Primary | ICD-10-CM

## 2023-11-02 PROCEDURE — 3078F DIAST BP <80 MM HG: CPT | Performed by: INTERNAL MEDICINE

## 2023-11-02 PROCEDURE — 1036F TOBACCO NON-USER: CPT | Performed by: INTERNAL MEDICINE

## 2023-11-02 PROCEDURE — 3017F COLORECTAL CA SCREEN DOC REV: CPT | Performed by: INTERNAL MEDICINE

## 2023-11-02 PROCEDURE — 1123F ACP DISCUSS/DSCN MKR DOCD: CPT | Performed by: INTERNAL MEDICINE

## 2023-11-02 PROCEDURE — G8427 DOCREV CUR MEDS BY ELIG CLIN: HCPCS | Performed by: INTERNAL MEDICINE

## 2023-11-02 PROCEDURE — 3074F SYST BP LT 130 MM HG: CPT | Performed by: INTERNAL MEDICINE

## 2023-11-02 PROCEDURE — 99212 OFFICE O/P EST SF 10 MIN: CPT | Performed by: INTERNAL MEDICINE

## 2023-11-02 PROCEDURE — G8484 FLU IMMUNIZE NO ADMIN: HCPCS | Performed by: INTERNAL MEDICINE

## 2023-11-02 PROCEDURE — G8420 CALC BMI NORM PARAMETERS: HCPCS | Performed by: INTERNAL MEDICINE

## 2023-11-02 NOTE — PROGRESS NOTES
1. \"Have you been to the ER, urgent care clinic since your last visit? Hospitalized since your last visit? \" No    2. \"Have you seen or consulted any other health care providers outside of the [address] [place] [address] since your last visit? \" No    3. For patients aged 43-73: Has the patient had a colonoscopy / FIT/ Cologuard? Yes no care gap present      If the patient is female:    4. For patients aged 43-66: Has the patient had a mammogram within the past 2 years? NA - based on age or sex    11. For patients aged 21-65: Has the patient had a pap smear?  NA - based on age or sex    Health Maintenance Due   Topic Date Due    Hepatitis B vaccine (1 of 3 - Risk 3-dose series) Never done    Shingles vaccine (2 of 3) 03/25/2016    COVID-19 Vaccine (4 - Moderna series) 01/04/2022    Diabetic retinal exam  06/29/2022    Annual Wellness Visit (AWV)  11/24/2023

## 2023-11-06 ENCOUNTER — CARE COORDINATION (OUTPATIENT)
Facility: CLINIC | Age: 73
End: 2023-11-06

## 2023-11-06 NOTE — CARE COORDINATION
1st Attempted to contact patient for [facility] ( High Risk Case Management ). Patient on [facility] list 11/6/23. ACM made 3 calls was able to leave a message on the 3rd number for a call back. No answer, left message with contact information provided. Will attempt to contact at a later time . EMR reviewed Noted last ED 11/3/23 removal of head staples s/p fall 10/23.  Was seen by PCP 11/2/23

## 2023-11-06 NOTE — CARE COORDINATION
.Complex Case Management Denial       Date/Time:  2023 3:30 PM    Method of communication with patient:phone    [address] ( [place]) contacted the [address] by telephone to perform Ambulatory Care Coordination. Verified name and  with family\",POA , \"caregiver as identifiers. Provided introduction to self, and explanation of the Ambulatory Care Manager's role. Reviewed most recent clinic visit w/ caregiver who verbalized understanding. Caregiver given an opportunity to ask questions. The caregiver Declines Care Coordination Services at this time. The caregiver agrees to contact the PCP office or the [address] for questions related to their healthcare. ACDANIEL provided contact information for future reference.

## 2023-11-07 NOTE — PROGRESS NOTES
Patient came for suture removal but he actually has a stable on his head laceration-sent to the ER who placed this as we did not have a staple removal kit in office  Laceration noted to be healed though patient had some pain when I tugged on the staple to see if I could get this out with the suture removal scissors    Foot pain is better -continue with Colchicine  Lab Results   Component Value Date    URICACID 8.8 (H) 10/23/2023         Has seen Cardio recently    Advised to call Renal re CKD-continue to avoid OTC NSAIDs  On Jardicance; off Metformin    BP better today as he is no longer in pain

## 2023-11-10 ENCOUNTER — CARE COORDINATION (OUTPATIENT)
Facility: CLINIC | Age: 73
End: 2023-11-10

## 2023-11-10 NOTE — CARE COORDINATION
Ctn received missed call from Pt. Care  Outreach Attempt    CTN returned missed call from Pt. Unable to reach patient. Left a voice message with office contact information. No Pt. Medical/health ( HIPAA) information left on message.      Patient: Leonie Arreguin Patient : 1950 MRN: [id number]     follow up appointment(s):   Future Appointments   Date Time Provider [address] [place] [address]   2024 10:15 AM Reuben Contreras MD AMA BS AMB

## 2024-01-04 ENCOUNTER — OFFICE VISIT (OUTPATIENT)
Facility: CLINIC | Age: 74
End: 2024-01-04
Payer: MEDICARE

## 2024-01-04 VITALS
OXYGEN SATURATION: 98 % | HEART RATE: 85 BPM | RESPIRATION RATE: 16 BRPM | DIASTOLIC BLOOD PRESSURE: 92 MMHG | HEIGHT: 66 IN | TEMPERATURE: 98.6 F | SYSTOLIC BLOOD PRESSURE: 157 MMHG | WEIGHT: 143 LBS | BODY MASS INDEX: 22.98 KG/M2

## 2024-01-04 DIAGNOSIS — I10 ESSENTIAL HYPERTENSION: Primary | ICD-10-CM

## 2024-01-04 DIAGNOSIS — M10.9 GOUT OF FOOT, UNSPECIFIED CAUSE, UNSPECIFIED CHRONICITY, UNSPECIFIED LATERALITY: ICD-10-CM

## 2024-01-04 DIAGNOSIS — E78.5 HYPERLIPIDEMIA, UNSPECIFIED HYPERLIPIDEMIA TYPE: ICD-10-CM

## 2024-01-04 DIAGNOSIS — I25.119 CORONARY ARTERY DISEASE INVOLVING NATIVE CORONARY ARTERY OF NATIVE HEART WITH ANGINA PECTORIS (HCC): ICD-10-CM

## 2024-01-04 DIAGNOSIS — E11.21 TYPE II DIABETES MELLITUS WITH NEPHROPATHY (HCC): ICD-10-CM

## 2024-01-04 DIAGNOSIS — N18.4 STAGE 4 CHRONIC KIDNEY DISEASE (HCC): ICD-10-CM

## 2024-01-04 PROCEDURE — G8427 DOCREV CUR MEDS BY ELIG CLIN: HCPCS | Performed by: INTERNAL MEDICINE

## 2024-01-04 PROCEDURE — 99214 OFFICE O/P EST MOD 30 MIN: CPT | Performed by: INTERNAL MEDICINE

## 2024-01-04 PROCEDURE — 2022F DILAT RTA XM EVC RTNOPTHY: CPT | Performed by: INTERNAL MEDICINE

## 2024-01-04 PROCEDURE — 1123F ACP DISCUSS/DSCN MKR DOCD: CPT | Performed by: INTERNAL MEDICINE

## 2024-01-04 PROCEDURE — G8420 CALC BMI NORM PARAMETERS: HCPCS | Performed by: INTERNAL MEDICINE

## 2024-01-04 PROCEDURE — 1036F TOBACCO NON-USER: CPT | Performed by: INTERNAL MEDICINE

## 2024-01-04 PROCEDURE — G8484 FLU IMMUNIZE NO ADMIN: HCPCS | Performed by: INTERNAL MEDICINE

## 2024-01-04 PROCEDURE — 3080F DIAST BP >= 90 MM HG: CPT | Performed by: INTERNAL MEDICINE

## 2024-01-04 PROCEDURE — 3077F SYST BP >= 140 MM HG: CPT | Performed by: INTERNAL MEDICINE

## 2024-01-04 PROCEDURE — 3017F COLORECTAL CA SCREEN DOC REV: CPT | Performed by: INTERNAL MEDICINE

## 2024-01-04 PROCEDURE — 3046F HEMOGLOBIN A1C LEVEL >9.0%: CPT | Performed by: INTERNAL MEDICINE

## 2024-01-04 ASSESSMENT — PATIENT HEALTH QUESTIONNAIRE - PHQ9
2. FEELING DOWN, DEPRESSED OR HOPELESS: 0
SUM OF ALL RESPONSES TO PHQ9 QUESTIONS 1 & 2: 0
SUM OF ALL RESPONSES TO PHQ QUESTIONS 1-9: 0
1. LITTLE INTEREST OR PLEASURE IN DOING THINGS: 0
SUM OF ALL RESPONSES TO PHQ QUESTIONS 1-9: 0

## 2024-01-04 NOTE — PROGRESS NOTES
1. \"Have you been to the ER, urgent care clinic since your last visit?  Hospitalized since your last visit?\" No    2. \"Have you seen or consulted any other health care providers outside of the [facility] since your last visit?\" Yes follow up with Dr. Reese    3. For patients aged 45-75: Has the patient had a colonoscopy / FIT/ Cologuard? Yes no care gap present      If the patient is female:    4. For patients aged 40-74: Has the patient had a mammogram within the past 2 years?   NA - based on age or sex    5. For patients aged 21-65: Has the patient had a pap smear? NA - based on age or sex    Health Maintenance Due   Topic Date Due    Respiratory Syncytial Virus (RSV) Pregnant or age 60 yrs+ (1 - 1-dose 60+ series) Never done    Shingles vaccine (2 of 3) 03/25/2016    Diabetic retinal exam  06/29/2022    COVID-19 Vaccine (4 - 2023-24 season) 09/01/2023    Annual Wellness Visit (Medicare Advantage)  Never done

## 2024-01-04 NOTE — PATIENT INSTRUCTIONS
DASH Diet: Care Instructions  Your Care Instructions     The DASH diet is an eating plan that can help lower your blood pressure. DASH stands for Dietary Approaches to Stop Hypertension. Hypertension is high blood pressure.  The DASH diet focuses on eating foods that are high in calcium, potassium, and magnesium. These nutrients can lower blood pressure. The foods that are highest in these nutrients are fruits, vegetables, low-fat dairy products, nuts, seeds, and legumes. But taking calcium, potassium, and magnesium supplements instead of eating foods that are high in those nutrients does not have the same effect. The DASH diet also includes whole grains, fish, and poultry.  The DASH diet is one of several lifestyle changes your doctor may recommend to lower your high blood pressure. Your doctor may also want you to decrease the amount of sodium in your diet. Lowering sodium while following the DASH diet can lower blood pressure even further than just the DASH diet alone.  Follow-up care is a key part of your treatment and safety. Be sure to make and go to all appointments, and call your doctor if you are having problems. It's also a good idea to know your test results and keep a list of the medicines you take.  How can you care for yourself at home?  Following the DASH diet  Eat 4 to 5 servings of fruit each day. A serving is 1 medium-sized piece of fruit, ½ cup chopped or canned fruit, 1/4 cup dried fruit, or 4 ounces (½ cup) of fruit juice. Choose fruit more often than fruit juice.  Eat 4 to 5 servings of vegetables each day. A serving is 1 cup of lettuce or raw leafy vegetables, ½ cup of chopped or cooked vegetables, or 4 ounces (½ cup) of vegetable juice. Choose vegetables more often than vegetable juice.  Get 2 to 3 servings of low-fat and fat-free dairy each day. A serving is 8 ounces of milk, 1 cup of yogurt, or 1 ½ ounces of cheese.  Eat 6 to 8 servings of grains each day. A serving is 1 slice of bread, 1

## 2024-01-04 NOTE — PROGRESS NOTES
Assessment/ Plan:   Jagjit was seen today for follow-up chronic condition.    Diagnoses and all orders for this visit:    Essential hypertension-uncontrolled; advised wife/patient to inc ?Valsartan to 2 pills/day as prev ordered by Cardio-will change rx to 80 mg with next refill  Should also be on Metoprolol andAmlodipine; Bumex prn  -     CBC; Future  -     Comprehensive Metabolic Panel; Future    Stage 4 chronic kidney disease (HCC)-Renal following; advised to continue to avoid OTC NSAIDs  -     Comprehensive Metabolic Panel; Future    Type II diabetes mellitus with nephropathy (HCC)-restart Jardiance    Coronary artery disease involving native coronary artery of native heart with angina pectoris (HCC)-Cardio following/recent note reviewed    Gout of foot, unspecified cause, unspecified chronicity, unspecified laterality-on colchicine orn; if uric acid is still elev, will consider starting Allopurinol  -     Uric Acid; Future    Hyperlipidemia, unspecified hyperlipidemia type-goal LDL of less than 55 on high dose Lipitor; will consider adding Repatha to get to goal if not at goal  -     Lipid Panel; Future        Follow-up and Dispositions    Return in about 3 months (around 4/4/2024) for follow up.                     Chief Complaint   Patient presents with    Follow-up Chronic Condition     3 month       Pt is a 73 y.o. year old male who presents for follow up of his chronic medical problems    Health Maintenance Due   Topic Date Due    Respiratory Syncytial Virus (RSV) Pregnant or age 60 yrs+ (1 - 1-dose 60+ series) Never done    Shingles vaccine (2 of 3) 03/25/2016    Diabetic retinal exam  06/29/2022    COVID-19 Vaccine (4 - 2023-24 season) 09/01/2023    Annual Wellness Visit (Medicare Advantage)  Never done        Wt Readings from Last 3 Encounters:   01/04/24 64.9 kg (143 lb)   11/02/23 62.6 kg (138 lb)   10/23/23 62.6 kg (138 lb)      BP Readings from Last 3 Encounters:   01/04/24 (!) 157/92   11/02/23

## 2024-01-05 LAB
ALBUMIN SERPL-MCNC: 4.2 G/DL (ref 3.8–4.8)
ALBUMIN/GLOB SERPL: 1.6 {RATIO} (ref 1.2–2.2)
ALP SERPL-CCNC: 90 IU/L (ref 44–121)
ALT SERPL-CCNC: 29 IU/L (ref 0–44)
AST SERPL-CCNC: 22 IU/L (ref 0–40)
BASOPHILS # BLD AUTO: 0.1 X10E3/UL (ref 0–0.2)
BASOPHILS NFR BLD AUTO: 1 %
BILIRUB SERPL-MCNC: 0.5 MG/DL (ref 0–1.2)
BUN SERPL-MCNC: 25 MG/DL (ref 8–27)
BUN/CREAT SERPL: 11 (ref 10–24)
CALCIUM SERPL-MCNC: 9 MG/DL (ref 8.6–10.2)
CHLORIDE SERPL-SCNC: 110 MMOL/L (ref 96–106)
CHOLEST SERPL-MCNC: 104 MG/DL (ref 100–199)
CO2 SERPL-SCNC: 20 MMOL/L (ref 20–29)
CREAT SERPL-MCNC: 2.29 MG/DL (ref 0.76–1.27)
EGFRCR SERPLBLD CKD-EPI 2021: 29 ML/MIN/1.73
EOSINOPHIL # BLD AUTO: 0.3 X10E3/UL (ref 0–0.4)
EOSINOPHIL NFR BLD AUTO: 4 %
ERYTHROCYTE [DISTWIDTH] IN BLOOD BY AUTOMATED COUNT: 14.5 % (ref 11.6–15.4)
GLOBULIN SER CALC-MCNC: 2.6 G/DL (ref 1.5–4.5)
GLUCOSE SERPL-MCNC: 100 MG/DL (ref 70–99)
HCT VFR BLD AUTO: 36.9 % (ref 37.5–51)
HDLC SERPL-MCNC: 46 MG/DL
HGB BLD-MCNC: 11.9 G/DL (ref 13–17.7)
IMM GRANULOCYTES # BLD AUTO: 0 X10E3/UL (ref 0–0.1)
IMM GRANULOCYTES NFR BLD AUTO: 0 %
LDLC SERPL CALC-MCNC: 40 MG/DL (ref 0–99)
LYMPHOCYTES # BLD AUTO: 1.3 X10E3/UL (ref 0.7–3.1)
LYMPHOCYTES NFR BLD AUTO: 18 %
MCH RBC QN AUTO: 30.8 PG (ref 26.6–33)
MCHC RBC AUTO-ENTMCNC: 32.2 G/DL (ref 31.5–35.7)
MCV RBC AUTO: 96 FL (ref 79–97)
MONOCYTES # BLD AUTO: 0.7 X10E3/UL (ref 0.1–0.9)
MONOCYTES NFR BLD AUTO: 9 %
NEUTROPHILS # BLD AUTO: 4.9 X10E3/UL (ref 1.4–7)
NEUTROPHILS NFR BLD AUTO: 68 %
PLATELET # BLD AUTO: 149 X10E3/UL (ref 150–450)
POTASSIUM SERPL-SCNC: 5.7 MMOL/L (ref 3.5–5.2)
PROT SERPL-MCNC: 6.8 G/DL (ref 6–8.5)
RBC # BLD AUTO: 3.86 X10E6/UL (ref 4.14–5.8)
SODIUM SERPL-SCNC: 146 MMOL/L (ref 134–144)
SPECIMEN STATUS REPORT: NORMAL
TRIGL SERPL-MCNC: 91 MG/DL (ref 0–149)
URATE SERPL-MCNC: 7.1 MG/DL (ref 3.8–8.4)
VLDLC SERPL CALC-MCNC: 18 MG/DL (ref 5–40)
WBC # BLD AUTO: 7.2 X10E3/UL (ref 3.4–10.8)

## 2024-02-01 RX ORDER — AMLODIPINE BESYLATE 10 MG/1
TABLET ORAL
Qty: 90 TABLET | Refills: 3 | OUTPATIENT
Start: 2024-02-01

## 2024-03-04 DIAGNOSIS — E78.5 HYPERLIPIDEMIA, UNSPECIFIED HYPERLIPIDEMIA TYPE: ICD-10-CM

## 2024-03-05 RX ORDER — ATORVASTATIN CALCIUM 80 MG/1
80 TABLET, FILM COATED ORAL DAILY
Qty: 90 TABLET | Refills: 3 | Status: SHIPPED | OUTPATIENT
Start: 2024-03-05

## 2024-04-11 ENCOUNTER — OFFICE VISIT (OUTPATIENT)
Facility: CLINIC | Age: 74
End: 2024-04-11

## 2024-04-11 VITALS
DIASTOLIC BLOOD PRESSURE: 78 MMHG | WEIGHT: 142 LBS | SYSTOLIC BLOOD PRESSURE: 128 MMHG | OXYGEN SATURATION: 97 % | HEART RATE: 68 BPM | HEIGHT: 66 IN | RESPIRATION RATE: 16 BRPM | BODY MASS INDEX: 22.82 KG/M2 | TEMPERATURE: 98.7 F

## 2024-04-11 DIAGNOSIS — E11.21 TYPE II DIABETES MELLITUS WITH NEPHROPATHY (HCC): ICD-10-CM

## 2024-04-11 DIAGNOSIS — I42.0 ISCHEMIC CONGESTIVE CARDIOMYOPATHY (HCC): ICD-10-CM

## 2024-04-11 DIAGNOSIS — N18.4 STAGE 4 CHRONIC KIDNEY DISEASE (HCC): ICD-10-CM

## 2024-04-11 DIAGNOSIS — M10.9 GOUT OF FOOT, UNSPECIFIED CAUSE, UNSPECIFIED CHRONICITY, UNSPECIFIED LATERALITY: ICD-10-CM

## 2024-04-11 DIAGNOSIS — I25.5 ISCHEMIC CONGESTIVE CARDIOMYOPATHY (HCC): ICD-10-CM

## 2024-04-11 DIAGNOSIS — E78.5 HYPERLIPIDEMIA, UNSPECIFIED HYPERLIPIDEMIA TYPE: ICD-10-CM

## 2024-04-11 DIAGNOSIS — I25.119 CORONARY ARTERY DISEASE INVOLVING NATIVE CORONARY ARTERY OF NATIVE HEART WITH ANGINA PECTORIS (HCC): ICD-10-CM

## 2024-04-11 DIAGNOSIS — I10 ESSENTIAL HYPERTENSION: ICD-10-CM

## 2024-04-11 DIAGNOSIS — Z00.00 MEDICARE ANNUAL WELLNESS VISIT, SUBSEQUENT: Primary | ICD-10-CM

## 2024-04-11 LAB — HBA1C MFR BLD: 5.7 %

## 2024-04-11 ASSESSMENT — LIFESTYLE VARIABLES
HOW MANY STANDARD DRINKS CONTAINING ALCOHOL DO YOU HAVE ON A TYPICAL DAY: PATIENT DOES NOT DRINK
HOW OFTEN DO YOU HAVE A DRINK CONTAINING ALCOHOL: NEVER

## 2024-04-11 ASSESSMENT — PATIENT HEALTH QUESTIONNAIRE - PHQ9
2. FEELING DOWN, DEPRESSED OR HOPELESS: NOT AT ALL
SUM OF ALL RESPONSES TO PHQ QUESTIONS 1-9: 0
1. LITTLE INTEREST OR PLEASURE IN DOING THINGS: NOT AT ALL
SUM OF ALL RESPONSES TO PHQ QUESTIONS 1-9: 0
SUM OF ALL RESPONSES TO PHQ9 QUESTIONS 1 & 2: 0

## 2024-04-11 NOTE — PROGRESS NOTES
\"Have you been to the ER, urgent care clinic since your last visit?  Hospitalized since your last visit?\"    NO    “Have you seen or consulted any other health care providers outside of [facility] since your last visit?”    NO            Click Here for Release of Records Request

## 2024-04-11 NOTE — PROGRESS NOTES
Medicare Annual Wellness Visit    Jagjit Melendez is here for Medicare AWV    Assessment & Plan   Medicare annual wellness visit, subsequent-advised on vaccines needed    Type II diabetes mellitus with nephropathy (HCC)-on Jardiance; off Metformin  -     AMB POC HEMOGLOBIN A1C  Hemoglobin A1C   Date Value Ref Range Status   09/19/2023 5.7 (H) 4.8 - 5.6 % Final     Comment:              Prediabetes: 5.7 - 6.4           Diabetes: >6.4           Glycemic control for adults with diabetes: <7.0       Hemoglobin A1C, POC   Date Value Ref Range Status   04/11/2024 5.7 % Final      Hyperlipidemia, unspecified hyperlipidemia type-goal LDL of less than 55 on high dose Lipitor    Essential hypertension-controlled, continue with present meds    Stage 4 chronic kidney disease (HCC)-Renal following; continue to avoid OTC and rx NSAIDs    Coronary artery disease involving native coronary artery of native heart with angina pectoris (HCC)-Cardio following; continue with ASA/PLavix, statin, beta blocker, Jardiance; continue to avoid smoking     Gout of foot, unspecified cause, unspecified chronicity, unspecified laterality-continue with low purine diet    Ischemic congestive cardiomyopathy (HCC)--on Jardiance, Toprol and ARB      Recommendations for Preventive Services Due: see orders and patient instructions/AVS.  Recommended screening schedule for the next 5-10 years is provided to the patient in written form: see Patient Instructions/AVS.     Return in 3 months (on 7/11/2024) for follow up.     Subjective     Health Maintenance Due   Topic Date Due    Respiratory Syncytial Virus (RSV) Pregnant or age 60 yrs+ (1 - 1-dose 60+ series) Never done    Shingles vaccine (2 of 3) 03/25/2016    Diabetic retinal exam -wife will sched him downstairs 06/29/2022    COVID-19 Vaccine (4 - 2023-24 season) 09/01/2023        Wt Readings from Last 3 Encounters:   04/11/24 64.4 kg (142 lb)   01/04/24 64.9 kg (143 lb)   11/02/23 62.6 kg (138 lb)

## 2024-04-14 PROBLEM — I42.0 ISCHEMIC CONGESTIVE CARDIOMYOPATHY (HCC): Status: ACTIVE | Noted: 2024-04-14

## 2024-04-14 PROBLEM — I25.5 ISCHEMIC CONGESTIVE CARDIOMYOPATHY (HCC): Status: ACTIVE | Noted: 2024-04-14

## 2024-05-28 DIAGNOSIS — M10.9 ACUTE GOUT OF RIGHT FOOT, UNSPECIFIED CAUSE: ICD-10-CM

## 2024-05-28 RX ORDER — COLCHICINE 0.6 MG/1
TABLET ORAL
Qty: 30 TABLET | Refills: 1 | Status: SHIPPED | OUTPATIENT
Start: 2024-05-28

## 2024-08-05 ENCOUNTER — OFFICE VISIT (OUTPATIENT)
Facility: CLINIC | Age: 74
End: 2024-08-05
Payer: MEDICARE

## 2024-08-05 VITALS
WEIGHT: 155.6 LBS | OXYGEN SATURATION: 97 % | BODY MASS INDEX: 25.01 KG/M2 | HEIGHT: 66 IN | HEART RATE: 69 BPM | SYSTOLIC BLOOD PRESSURE: 182 MMHG | TEMPERATURE: 98.6 F | DIASTOLIC BLOOD PRESSURE: 100 MMHG | RESPIRATION RATE: 16 BRPM

## 2024-08-05 DIAGNOSIS — M10.9 GOUT OF FOOT, UNSPECIFIED CAUSE, UNSPECIFIED CHRONICITY, UNSPECIFIED LATERALITY: ICD-10-CM

## 2024-08-05 DIAGNOSIS — N18.4 STAGE 4 CHRONIC KIDNEY DISEASE (HCC): ICD-10-CM

## 2024-08-05 DIAGNOSIS — E11.21 TYPE II DIABETES MELLITUS WITH NEPHROPATHY (HCC): Primary | ICD-10-CM

## 2024-08-05 DIAGNOSIS — I10 ESSENTIAL HYPERTENSION: ICD-10-CM

## 2024-08-05 DIAGNOSIS — E78.5 HYPERLIPIDEMIA, UNSPECIFIED HYPERLIPIDEMIA TYPE: ICD-10-CM

## 2024-08-05 DIAGNOSIS — M19.072 PRIMARY OSTEOARTHRITIS OF LEFT FOOT: ICD-10-CM

## 2024-08-05 DIAGNOSIS — I25.119 CORONARY ARTERY DISEASE INVOLVING NATIVE CORONARY ARTERY OF NATIVE HEART WITH ANGINA PECTORIS (HCC): ICD-10-CM

## 2024-08-05 LAB — HBA1C MFR BLD: 5.9 %

## 2024-08-05 PROCEDURE — 1123F ACP DISCUSS/DSCN MKR DOCD: CPT | Performed by: INTERNAL MEDICINE

## 2024-08-05 PROCEDURE — G8427 DOCREV CUR MEDS BY ELIG CLIN: HCPCS | Performed by: INTERNAL MEDICINE

## 2024-08-05 PROCEDURE — 1036F TOBACCO NON-USER: CPT | Performed by: INTERNAL MEDICINE

## 2024-08-05 PROCEDURE — 3077F SYST BP >= 140 MM HG: CPT | Performed by: INTERNAL MEDICINE

## 2024-08-05 PROCEDURE — 3017F COLORECTAL CA SCREEN DOC REV: CPT | Performed by: INTERNAL MEDICINE

## 2024-08-05 PROCEDURE — 99214 OFFICE O/P EST MOD 30 MIN: CPT | Performed by: INTERNAL MEDICINE

## 2024-08-05 PROCEDURE — 2022F DILAT RTA XM EVC RTNOPTHY: CPT | Performed by: INTERNAL MEDICINE

## 2024-08-05 PROCEDURE — 3046F HEMOGLOBIN A1C LEVEL >9.0%: CPT | Performed by: INTERNAL MEDICINE

## 2024-08-05 PROCEDURE — 83036 HEMOGLOBIN GLYCOSYLATED A1C: CPT | Performed by: INTERNAL MEDICINE

## 2024-08-05 PROCEDURE — 3080F DIAST BP >= 90 MM HG: CPT | Performed by: INTERNAL MEDICINE

## 2024-08-05 PROCEDURE — G8419 CALC BMI OUT NRM PARAM NOF/U: HCPCS | Performed by: INTERNAL MEDICINE

## 2024-08-05 RX ORDER — METOPROLOL SUCCINATE 100 MG/1
100 TABLET, EXTENDED RELEASE ORAL DAILY
Qty: 90 TABLET | Refills: 1 | Status: SHIPPED | OUTPATIENT
Start: 2024-08-05

## 2024-08-05 RX ORDER — VALSARTAN 80 MG/1
80 TABLET ORAL DAILY
Qty: 30 TABLET | Refills: 1 | Status: SHIPPED | OUTPATIENT
Start: 2024-08-05

## 2024-08-05 SDOH — ECONOMIC STABILITY: FOOD INSECURITY: WITHIN THE PAST 12 MONTHS, THE FOOD YOU BOUGHT JUST DIDN'T LAST AND YOU DIDN'T HAVE MONEY TO GET MORE.: NEVER TRUE

## 2024-08-05 SDOH — ECONOMIC STABILITY: FOOD INSECURITY: WITHIN THE PAST 12 MONTHS, YOU WORRIED THAT YOUR FOOD WOULD RUN OUT BEFORE YOU GOT MONEY TO BUY MORE.: NEVER TRUE

## 2024-08-05 SDOH — ECONOMIC STABILITY: INCOME INSECURITY: HOW HARD IS IT FOR YOU TO PAY FOR THE VERY BASICS LIKE FOOD, HOUSING, MEDICAL CARE, AND HEATING?: NOT HARD AT ALL

## 2024-08-05 NOTE — PROGRESS NOTES
Assessment/ Plan:   Jagjit was seen today for follow-up chronic condition.    Diagnoses and all orders for this visit:    Type II diabetes mellitus with nephropathy (HCC)-A1c at goal off meds  -     AMB POC HEMOGLOBIN A1C    Hyperlipidemia, unspecified hyperlipidemia type-restart high dose statin bec of CAD; check lipids next visit    Essential hypertension-uncontrolled; will inc dose of Toprol as HR still int he high 60's anyway  -     valsartan (DIOVAN) 80 MG tablet; Take 1 tablet by mouth daily  -     metoprolol succinate (TOPROL XL) 100 MG extended release tablet; Take 1 tablet by mouth daily    Stage 4 chronic kidney disease (HCC)-sees Renal, continue to avoid OTC and rx NSAIDs    Coronary artery disease involving native coronary artery of native heart with angina pectoris (HCC)-Cardio following; currently CP free but has some discomfort on the incisional site-reassured    Gout of foot, unspecified cause, unspecified chronicity, unspecified laterality-given copy of low purine diet and will take Colchicine prn    Primary osteoarthritis of left foot-saw Podiatry; prn follow up        Follow-up and Dispositions    Return in about 3 months (around 11/5/2024) for follow up.                     Chief Complaint   Patient presents with    Follow-up Chronic Condition     3 month follow up       Pt is a 73 y.o. year old male who presents for follow up of his chronic medical problems    Health Maintenance Due   Topic Date Due    Respiratory Syncytial Virus (RSV) Pregnant or age 60 yrs+ (1 - 1-dose 60+ series) Never done    Shingles vaccine (2 of 3) 03/25/2016    Diabetic retinal exam  06/29/2022    COVID-19 Vaccine (4 - 2023-24 season) 09/01/2023    Flu vaccine (1) 08/01/2024      Wt Readings from Last 3 Encounters:   08/05/24 70.6 kg (155 lb 9.6 oz)   04/11/24 64.4 kg (142 lb)   01/04/24 64.9 kg (143 lb)        BP Readings from Last 3 Encounters:   08/05/24 (!) 182/107   04/11/24 128/78   01/04/24 (!) 157/92    Repeat

## 2024-08-05 NOTE — PROGRESS NOTES
\"Have you been to the ER, urgent care clinic since your last visit?  Hospitalized since your last visit?\"    NO    “Have you seen or consulted any other health care providers outside of [facility] since your last visit?”    NO            Click Here for Release of Records Request

## 2024-09-03 RX ORDER — ERGOCALCIFEROL 1.25 MG/1
50000 CAPSULE, LIQUID FILLED ORAL WEEKLY
Qty: 12 CAPSULE | Refills: 0 | Status: SHIPPED | OUTPATIENT
Start: 2024-09-03

## 2024-09-27 DIAGNOSIS — I10 ESSENTIAL HYPERTENSION: ICD-10-CM

## 2024-09-30 RX ORDER — VALSARTAN 80 MG/1
80 TABLET ORAL DAILY
Qty: 90 TABLET | Refills: 1 | Status: SHIPPED | OUTPATIENT
Start: 2024-09-30

## 2024-11-13 RX ORDER — ERGOCALCIFEROL 1.25 MG/1
CAPSULE, LIQUID FILLED ORAL
Qty: 12 CAPSULE | Refills: 3 | Status: SHIPPED | OUTPATIENT
Start: 2024-11-13

## 2024-12-12 ENCOUNTER — OFFICE VISIT (OUTPATIENT)
Facility: CLINIC | Age: 74
End: 2024-12-12
Payer: MEDICARE

## 2024-12-12 VITALS
HEART RATE: 92 BPM | DIASTOLIC BLOOD PRESSURE: 77 MMHG | HEIGHT: 66 IN | WEIGHT: 158 LBS | RESPIRATION RATE: 16 BRPM | SYSTOLIC BLOOD PRESSURE: 124 MMHG | OXYGEN SATURATION: 97 % | TEMPERATURE: 98.7 F | BODY MASS INDEX: 25.39 KG/M2

## 2024-12-12 DIAGNOSIS — I10 ESSENTIAL HYPERTENSION: Primary | ICD-10-CM

## 2024-12-12 DIAGNOSIS — E05.90 SUBCLINICAL HYPERTHYROIDISM: ICD-10-CM

## 2024-12-12 DIAGNOSIS — Z23 NEED FOR IMMUNIZATION AGAINST INFLUENZA: ICD-10-CM

## 2024-12-12 DIAGNOSIS — E11.21 TYPE II DIABETES MELLITUS WITH NEPHROPATHY (HCC): ICD-10-CM

## 2024-12-12 DIAGNOSIS — E78.5 HYPERLIPIDEMIA, UNSPECIFIED HYPERLIPIDEMIA TYPE: ICD-10-CM

## 2024-12-12 DIAGNOSIS — I25.119 CORONARY ARTERY DISEASE INVOLVING NATIVE CORONARY ARTERY OF NATIVE HEART WITH ANGINA PECTORIS (HCC): ICD-10-CM

## 2024-12-12 DIAGNOSIS — M10.9 GOUT OF FOOT, UNSPECIFIED CAUSE, UNSPECIFIED CHRONICITY, UNSPECIFIED LATERALITY: ICD-10-CM

## 2024-12-12 DIAGNOSIS — N18.4 STAGE 4 CHRONIC KIDNEY DISEASE (HCC): ICD-10-CM

## 2024-12-12 DIAGNOSIS — M19.072 PRIMARY OSTEOARTHRITIS OF LEFT FOOT: ICD-10-CM

## 2024-12-12 PROCEDURE — 3017F COLORECTAL CA SCREEN DOC REV: CPT | Performed by: INTERNAL MEDICINE

## 2024-12-12 PROCEDURE — 2022F DILAT RTA XM EVC RTNOPTHY: CPT | Performed by: INTERNAL MEDICINE

## 2024-12-12 PROCEDURE — G8482 FLU IMMUNIZE ORDER/ADMIN: HCPCS | Performed by: INTERNAL MEDICINE

## 2024-12-12 PROCEDURE — 90653 IIV ADJUVANT VACCINE IM: CPT | Performed by: INTERNAL MEDICINE

## 2024-12-12 PROCEDURE — 1036F TOBACCO NON-USER: CPT | Performed by: INTERNAL MEDICINE

## 2024-12-12 PROCEDURE — 3078F DIAST BP <80 MM HG: CPT | Performed by: INTERNAL MEDICINE

## 2024-12-12 PROCEDURE — G8427 DOCREV CUR MEDS BY ELIG CLIN: HCPCS | Performed by: INTERNAL MEDICINE

## 2024-12-12 PROCEDURE — 1123F ACP DISCUSS/DSCN MKR DOCD: CPT | Performed by: INTERNAL MEDICINE

## 2024-12-12 PROCEDURE — 3074F SYST BP LT 130 MM HG: CPT | Performed by: INTERNAL MEDICINE

## 2024-12-12 PROCEDURE — G8419 CALC BMI OUT NRM PARAM NOF/U: HCPCS | Performed by: INTERNAL MEDICINE

## 2024-12-12 PROCEDURE — G0008 ADMIN INFLUENZA VIRUS VAC: HCPCS | Performed by: INTERNAL MEDICINE

## 2024-12-12 PROCEDURE — 99214 OFFICE O/P EST MOD 30 MIN: CPT | Performed by: INTERNAL MEDICINE

## 2024-12-12 PROCEDURE — 3046F HEMOGLOBIN A1C LEVEL >9.0%: CPT | Performed by: INTERNAL MEDICINE

## 2024-12-12 ASSESSMENT — PATIENT HEALTH QUESTIONNAIRE - PHQ9
2. FEELING DOWN, DEPRESSED OR HOPELESS: NOT AT ALL
1. LITTLE INTEREST OR PLEASURE IN DOING THINGS: NOT AT ALL
SUM OF ALL RESPONSES TO PHQ QUESTIONS 1-9: 0
SUM OF ALL RESPONSES TO PHQ QUESTIONS 1-9: 0
SUM OF ALL RESPONSES TO PHQ9 QUESTIONS 1 & 2: 0
SUM OF ALL RESPONSES TO PHQ QUESTIONS 1-9: 0
SUM OF ALL RESPONSES TO PHQ QUESTIONS 1-9: 0

## 2024-12-12 NOTE — PROGRESS NOTES
\"Have you been to the ER, urgent care clinic since your last visit?  Hospitalized since your last visit?\"    NO    “Have you seen or consulted any other health care providers outside our system since your last visit?”    Yes- ophthalmology  “Have you had a diabetic eye exam?”    YES - was seen in December Dr. Ortiz  Date of last diabetic eye exam: 6/29/2021   Injection of Fluad given by Tova Mike LPN. Patient observed. No signs nor symptoms of any adverse reactions.Patient tolerated injection well.           
occlusion and 100% large OM branch occlusion. LVEF 25 % with posterior basal and inferior wall severe hypokinesis and apical hypokinesis with reasonable contraction pattern in the distal anterior wall suggesting viability. Elevated LVEDP 40 mmHg prior to administration of contrast. Status post insertion of intra-aortic balloon pump counterpulsation due to critical multivessel disease and elevated LVEDP. 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  S/p intra-aortic balloon pump insertion  Transferred to [facility] for urgent CT surgery consult  CABG 4/29/2023: LIMA-LAD, rSVG-OM, seq [id number] 4/28/2023  Ischemic Cardiomyopathy  EF 25% by cath 4/27/2023  Echo 4/27/2023 EF 40%, mild TR, PAP 29 mmHg  Echo 1/2024 EF 48%, global hypoK, mod MR PAP 42 mmHg. LV 4.9  HTN  HLD  DM II  CKD  Hx. Small Bowel obstruction  Tobacco use  Family Hx CAD - mother at older age, Nephew with early onset CAD requiring CABG    Follow will be scheduled in: 6-8 mo with MD      Renal note form 6/2024 reviewed-next follow up?  Valsartan started bec of elev BP  ?Jardiance stopped by Cardio-it seems this was stopped bec of CKD    On Lipitor not Crestor      Labs ordered by Dr Franklin prior to visit on Dec 24    Saw Podiatry-OA left foot    On Colcrys prn  Lab Results   Component Value Date    URICACID 8.8 12/16/2024        ROS:    Pt denies: Wt loss, Fever/Chills, HA, Visual changes, Fatigue, Chest pain, SOB, DYER, Abd pain, N/V/D/C, Blood in stool or urine, Edema. Pertinent positive as above in HPI. All others were negative    Patient Active Problem List   Diagnosis    Tubular adenoma of colon    Overweight (BMI 25.0-29.9)    Smoking    Advanced directives, counseling/discussion    Hyperlipidemia    Essential hypertension    Type II diabetes mellitus with nephropathy (HCC)    Primary osteoarthritis of left knee    Subclinical hyperthyroidism    Hearing loss    Coronary artery

## 2024-12-16 LAB
A/G RATIO: 1.3 RATIO (ref 1.1–2.6)
ALBUMIN: 4.1 G/DL (ref 3.5–5)
ALP BLD-CCNC: 132 U/L (ref 40–125)
ALT SERPL-CCNC: 19 U/L (ref 5–40)
ANION GAP SERPL CALCULATED.3IONS-SCNC: 11 MMOL/L (ref 3–15)
AST SERPL-CCNC: 17 U/L (ref 10–37)
BILIRUB SERPL-MCNC: 0.4 MG/DL (ref 0.2–1.2)
BUN BLDV-MCNC: 37 MG/DL (ref 6–22)
CALCIUM SERPL-MCNC: 8.7 MG/DL (ref 8.4–10.5)
CHLORIDE BLD-SCNC: 111 MMOL/L (ref 98–110)
CHOLESTEROL, TOTAL: 124 MG/DL (ref 110–200)
CHOLESTEROL/HDL RATIO: 3.9 (ref 0–5)
CO2: 22 MMOL/L (ref 20–32)
CREAT SERPL-MCNC: 2.8 MG/DL (ref 0.8–1.6)
GFR, ESTIMATED: 23 ML/MIN/1.73 SQ.M.
GLOBULIN: 3.1 G/DL (ref 2–4)
GLUCOSE: 128 MG/DL (ref 70–99)
HCT VFR BLD CALC: 40.5 % (ref 37.8–52.2)
HDLC SERPL-MCNC: 32 MG/DL
HEMOGLOBIN: 13.3 G/DL (ref 12.6–17.1)
LDL CHOLESTEROL: 63 MG/DL (ref 50–99)
LDL/HDL RATIO: 2
MCH RBC QN AUTO: 31 PG (ref 26–34)
MCHC RBC AUTO-ENTMCNC: 33 G/DL (ref 31–36)
MCV RBC AUTO: 96 FL (ref 80–95)
NON-HDL CHOLESTEROL: 92 MG/DL
PDW BLD-RTO: 14.4 % (ref 10–15.5)
PLATELET # BLD: 153 K/UL (ref 140–440)
PMV BLD AUTO: 10.7 FL (ref 9–13)
POTASSIUM SERPL-SCNC: 5.1 MMOL/L (ref 3.5–5.5)
RBC # BLD: 4.23 M/UL (ref 3.8–5.8)
SODIUM BLD-SCNC: 144 MMOL/L (ref 133–145)
TOTAL PROTEIN: 7.2 G/DL (ref 6.2–8.1)
TRIGL SERPL-MCNC: 144 MG/DL (ref 40–149)
TSH SERPL DL<=0.05 MIU/L-ACNC: 1.26 MCU/ML (ref 0.27–4.2)
URIC ACID: 8.8 MG/DL (ref 3.9–9)
VLDLC SERPL CALC-MCNC: 29 MG/DL (ref 8–30)
WBC # BLD: 4.9 K/UL (ref 4–11)

## 2025-02-18 DIAGNOSIS — I25.119 CORONARY ARTERY DISEASE INVOLVING NATIVE CORONARY ARTERY OF NATIVE HEART WITH ANGINA PECTORIS: ICD-10-CM

## 2025-02-18 RX ORDER — ASPIRIN 81 MG/1
81 TABLET, COATED ORAL DAILY
Qty: 90 TABLET | Refills: 3 | Status: SHIPPED | OUTPATIENT
Start: 2025-02-18

## 2025-04-10 RX ORDER — AMLODIPINE BESYLATE 5 MG/1
5 TABLET ORAL DAILY
Qty: 90 TABLET | Refills: 0 | Status: SHIPPED | OUTPATIENT
Start: 2025-04-10

## 2025-04-11 DIAGNOSIS — I10 ESSENTIAL HYPERTENSION: ICD-10-CM

## 2025-04-11 DIAGNOSIS — I25.119 CORONARY ARTERY DISEASE INVOLVING NATIVE CORONARY ARTERY OF NATIVE HEART WITH ANGINA PECTORIS: ICD-10-CM

## 2025-04-11 RX ORDER — CLOPIDOGREL BISULFATE 75 MG/1
75 TABLET ORAL DAILY
Qty: 90 TABLET | Refills: 3 | Status: SHIPPED | OUTPATIENT
Start: 2025-04-11

## 2025-04-11 RX ORDER — VALSARTAN 80 MG/1
80 TABLET ORAL DAILY
Qty: 90 TABLET | Refills: 3 | Status: SHIPPED | OUTPATIENT
Start: 2025-04-11

## 2025-04-21 ENCOUNTER — OFFICE VISIT (OUTPATIENT)
Facility: CLINIC | Age: 75
End: 2025-04-21
Payer: MEDICARE

## 2025-04-21 VITALS
OXYGEN SATURATION: 94 % | DIASTOLIC BLOOD PRESSURE: 74 MMHG | HEIGHT: 66 IN | RESPIRATION RATE: 16 BRPM | WEIGHT: 162 LBS | TEMPERATURE: 97.6 F | HEART RATE: 75 BPM | SYSTOLIC BLOOD PRESSURE: 137 MMHG | BODY MASS INDEX: 26.03 KG/M2

## 2025-04-21 DIAGNOSIS — Z00.00 MEDICARE ANNUAL WELLNESS VISIT, SUBSEQUENT: Primary | ICD-10-CM

## 2025-04-21 DIAGNOSIS — I10 ESSENTIAL HYPERTENSION: ICD-10-CM

## 2025-04-21 DIAGNOSIS — E11.21 TYPE II DIABETES MELLITUS WITH NEPHROPATHY (HCC): ICD-10-CM

## 2025-04-21 DIAGNOSIS — M10.9 GOUT OF FOOT, UNSPECIFIED CAUSE, UNSPECIFIED CHRONICITY, UNSPECIFIED LATERALITY: ICD-10-CM

## 2025-04-21 DIAGNOSIS — I25.5 ISCHEMIC CONGESTIVE CARDIOMYOPATHY (HCC): ICD-10-CM

## 2025-04-21 DIAGNOSIS — N18.4 STAGE 4 CHRONIC KIDNEY DISEASE (HCC): ICD-10-CM

## 2025-04-21 DIAGNOSIS — I25.119 CORONARY ARTERY DISEASE INVOLVING NATIVE CORONARY ARTERY OF NATIVE HEART WITH ANGINA PECTORIS: ICD-10-CM

## 2025-04-21 DIAGNOSIS — I42.0 ISCHEMIC CONGESTIVE CARDIOMYOPATHY (HCC): ICD-10-CM

## 2025-04-21 LAB — HBA1C MFR BLD: 7 %

## 2025-04-21 PROCEDURE — G8419 CALC BMI OUT NRM PARAM NOF/U: HCPCS | Performed by: INTERNAL MEDICINE

## 2025-04-21 PROCEDURE — 99214 OFFICE O/P EST MOD 30 MIN: CPT | Performed by: INTERNAL MEDICINE

## 2025-04-21 PROCEDURE — G8427 DOCREV CUR MEDS BY ELIG CLIN: HCPCS | Performed by: INTERNAL MEDICINE

## 2025-04-21 PROCEDURE — 3078F DIAST BP <80 MM HG: CPT | Performed by: INTERNAL MEDICINE

## 2025-04-21 PROCEDURE — 3051F HG A1C>EQUAL 7.0%<8.0%: CPT | Performed by: INTERNAL MEDICINE

## 2025-04-21 PROCEDURE — 83036 HEMOGLOBIN GLYCOSYLATED A1C: CPT | Performed by: INTERNAL MEDICINE

## 2025-04-21 PROCEDURE — G0439 PPPS, SUBSEQ VISIT: HCPCS | Performed by: INTERNAL MEDICINE

## 2025-04-21 PROCEDURE — 1036F TOBACCO NON-USER: CPT | Performed by: INTERNAL MEDICINE

## 2025-04-21 PROCEDURE — 1123F ACP DISCUSS/DSCN MKR DOCD: CPT | Performed by: INTERNAL MEDICINE

## 2025-04-21 PROCEDURE — 3017F COLORECTAL CA SCREEN DOC REV: CPT | Performed by: INTERNAL MEDICINE

## 2025-04-21 PROCEDURE — 3075F SYST BP GE 130 - 139MM HG: CPT | Performed by: INTERNAL MEDICINE

## 2025-04-21 PROCEDURE — 3046F HEMOGLOBIN A1C LEVEL >9.0%: CPT | Performed by: INTERNAL MEDICINE

## 2025-04-21 PROCEDURE — 2022F DILAT RTA XM EVC RTNOPTHY: CPT | Performed by: INTERNAL MEDICINE

## 2025-04-21 SDOH — ECONOMIC STABILITY: FOOD INSECURITY: WITHIN THE PAST 12 MONTHS, THE FOOD YOU BOUGHT JUST DIDN'T LAST AND YOU DIDN'T HAVE MONEY TO GET MORE.: NEVER TRUE

## 2025-04-21 SDOH — ECONOMIC STABILITY: FOOD INSECURITY: WITHIN THE PAST 12 MONTHS, YOU WORRIED THAT YOUR FOOD WOULD RUN OUT BEFORE YOU GOT MONEY TO BUY MORE.: NEVER TRUE

## 2025-04-21 ASSESSMENT — PATIENT HEALTH QUESTIONNAIRE - PHQ9
SUM OF ALL RESPONSES TO PHQ QUESTIONS 1-9: 0
SUM OF ALL RESPONSES TO PHQ QUESTIONS 1-9: 0
1. LITTLE INTEREST OR PLEASURE IN DOING THINGS: NOT AT ALL
2. FEELING DOWN, DEPRESSED OR HOPELESS: NOT AT ALL
SUM OF ALL RESPONSES TO PHQ QUESTIONS 1-9: 0
SUM OF ALL RESPONSES TO PHQ QUESTIONS 1-9: 0

## 2025-04-21 NOTE — PROGRESS NOTES
\"Have you been to the ER, urgent care clinic since your last visit?  Hospitalized since your last visit?\"    NO    “Have you seen or consulted any other health care providers outside our system since your last visit?”    Yes- Dr. Cristobal [facility]

## 2025-04-21 NOTE — PROGRESS NOTES
Medicare Annual Wellness Visit    Jagjit Melendez is here for Medicare AWV    Assessment & Plan   Medicare annual wellness visit, subsequent-advised on vaccines recommended and need for Adv Directives    Essential hypertension-now controlled, continue with Diovan, Norvasc, beta blocker      Type II diabetes mellitus with nephropathy (HCC)-A1c at goal on Jardiance  -     AMB POC HEMOGLOBIN A1C    Stage 4 chronic kidney disease (HCC)-Renal following; continue to avoid OTC NSAIDs    Ischemic congestive cardiomyopathy (HCC)- continue with present meds c/o Cardio    Coronary artery disease involving native coronary artery of native heart with angina pectoris-Cardio following  Lab Results   Component Value Date    LDL 63 12/16/2024    On high dose Lipitor 80 mg    Gout of foot, unspecified cause, unspecified chronicity, unspecified laterality-on prn Colchicine; advised on low purine diet  Recheck uric acid level next visit     Return in 3 months (on 7/21/2025).    Ask about compliance with Jardiance     Subjective       Health Maintenance Due   Topic Date Due    Respiratory Syncytial Virus (RSV) Pregnant or age 60 yrs+ (1 - Risk 60-74 years 1-dose series) Never done    Shingles vaccine (2 of 3) 03/25/2016    COVID-19 Vaccine (4 - 2024-25 season) 09/01/2024    Diabetic Alb to Cr ratio (uACR) test -c/o Renal 09/19/2024    DTaP/Tdap/Td vaccine (2 - Td or Tdap) 12/24/2024        Sees Cardio Dr Dougherty :  Mr. Melendez was seen on 3/5/2025 for the following conditions with recommendations as follows:    1. Coronary artery disease involving native coronary artery of native heart without angina pectoris  - EKG 12 LEAD UNIT PERFORMED  - clopidogreL (PLAVIX) 75 mg PO TABS; Take 1 Tab by Mouth Once a Day.  - Echo Cardiogram Complete; Future    2. Essential hypertension  - amLODIPine (NORVASC) 5 mg PO TABS; Take 1 Tab by Mouth Once a Day.    3. Dyslipidemia  - atorvastatin (LIPITOR) 80 mg PO TABS; Take 1 Tab by Mouth Once a Day.    4.

## 2025-06-13 DIAGNOSIS — E78.5 HYPERLIPIDEMIA, UNSPECIFIED HYPERLIPIDEMIA TYPE: ICD-10-CM

## 2025-06-16 RX ORDER — ATORVASTATIN CALCIUM 80 MG/1
80 TABLET, FILM COATED ORAL DAILY
Qty: 90 TABLET | Refills: 3 | Status: SHIPPED | OUTPATIENT
Start: 2025-06-16

## 2025-06-24 DIAGNOSIS — I25.119 CORONARY ARTERY DISEASE INVOLVING NATIVE CORONARY ARTERY OF NATIVE HEART WITH ANGINA PECTORIS: Primary | ICD-10-CM

## 2025-06-24 DIAGNOSIS — I10 ESSENTIAL HYPERTENSION: ICD-10-CM

## 2025-06-24 RX ORDER — METOPROLOL SUCCINATE 100 MG/1
100 TABLET, EXTENDED RELEASE ORAL DAILY
Qty: 90 TABLET | Refills: 3 | OUTPATIENT
Start: 2025-06-24

## 2025-06-24 RX ORDER — METOPROLOL SUCCINATE 50 MG/1
50 TABLET, EXTENDED RELEASE ORAL DAILY
Qty: 90 TABLET | Refills: 3 | Status: SHIPPED | OUTPATIENT
Start: 2025-06-24

## 2025-09-03 ENCOUNTER — OFFICE VISIT (OUTPATIENT)
Facility: CLINIC | Age: 75
End: 2025-09-03
Payer: MEDICARE

## 2025-09-03 VITALS
TEMPERATURE: 98.3 F | WEIGHT: 161 LBS | RESPIRATION RATE: 18 BRPM | OXYGEN SATURATION: 98 % | SYSTOLIC BLOOD PRESSURE: 137 MMHG | DIASTOLIC BLOOD PRESSURE: 82 MMHG | BODY MASS INDEX: 25.88 KG/M2 | HEART RATE: 68 BPM | HEIGHT: 66 IN

## 2025-09-03 DIAGNOSIS — I25.119 CORONARY ARTERY DISEASE INVOLVING NATIVE CORONARY ARTERY OF NATIVE HEART WITH ANGINA PECTORIS: ICD-10-CM

## 2025-09-03 DIAGNOSIS — E78.5 HYPERLIPIDEMIA, UNSPECIFIED HYPERLIPIDEMIA TYPE: ICD-10-CM

## 2025-09-03 DIAGNOSIS — I25.5 ISCHEMIC CONGESTIVE CARDIOMYOPATHY (HCC): ICD-10-CM

## 2025-09-03 DIAGNOSIS — N18.4 STAGE 4 CHRONIC KIDNEY DISEASE (HCC): ICD-10-CM

## 2025-09-03 DIAGNOSIS — I42.0 ISCHEMIC CONGESTIVE CARDIOMYOPATHY (HCC): ICD-10-CM

## 2025-09-03 DIAGNOSIS — I10 ESSENTIAL HYPERTENSION: ICD-10-CM

## 2025-09-03 DIAGNOSIS — H91.93 BILATERAL HEARING LOSS, UNSPECIFIED HEARING LOSS TYPE: ICD-10-CM

## 2025-09-03 DIAGNOSIS — E11.21 TYPE II DIABETES MELLITUS WITH NEPHROPATHY (HCC): Primary | ICD-10-CM

## 2025-09-03 DIAGNOSIS — M10.9 GOUT OF FOOT, UNSPECIFIED CAUSE, UNSPECIFIED CHRONICITY, UNSPECIFIED LATERALITY: ICD-10-CM

## 2025-09-03 LAB — HBA1C MFR BLD: 7.9 %

## 2025-09-03 PROCEDURE — 99214 OFFICE O/P EST MOD 30 MIN: CPT | Performed by: INTERNAL MEDICINE

## 2025-09-03 PROCEDURE — 3075F SYST BP GE 130 - 139MM HG: CPT | Performed by: INTERNAL MEDICINE

## 2025-09-03 PROCEDURE — 3017F COLORECTAL CA SCREEN DOC REV: CPT | Performed by: INTERNAL MEDICINE

## 2025-09-03 PROCEDURE — G8427 DOCREV CUR MEDS BY ELIG CLIN: HCPCS | Performed by: INTERNAL MEDICINE

## 2025-09-03 PROCEDURE — 2022F DILAT RTA XM EVC RTNOPTHY: CPT | Performed by: INTERNAL MEDICINE

## 2025-09-03 PROCEDURE — 3046F HEMOGLOBIN A1C LEVEL >9.0%: CPT | Performed by: INTERNAL MEDICINE

## 2025-09-03 PROCEDURE — G8419 CALC BMI OUT NRM PARAM NOF/U: HCPCS | Performed by: INTERNAL MEDICINE

## 2025-09-03 PROCEDURE — 1036F TOBACCO NON-USER: CPT | Performed by: INTERNAL MEDICINE

## 2025-09-03 PROCEDURE — 3079F DIAST BP 80-89 MM HG: CPT | Performed by: INTERNAL MEDICINE

## 2025-09-03 PROCEDURE — 83036 HEMOGLOBIN GLYCOSYLATED A1C: CPT | Performed by: INTERNAL MEDICINE

## 2025-09-03 PROCEDURE — 3051F HG A1C>EQUAL 7.0%<8.0%: CPT | Performed by: INTERNAL MEDICINE

## 2025-09-03 PROCEDURE — 1123F ACP DISCUSS/DSCN MKR DOCD: CPT | Performed by: INTERNAL MEDICINE

## 2025-09-03 RX ORDER — SODIUM BICARBONATE 650 MG/1
650 TABLET ORAL 2 TIMES DAILY
COMMUNITY
Start: 2025-08-25

## 2025-09-03 ASSESSMENT — PATIENT HEALTH QUESTIONNAIRE - PHQ9
SUM OF ALL RESPONSES TO PHQ QUESTIONS 1-9: 0
1. LITTLE INTEREST OR PLEASURE IN DOING THINGS: NOT AT ALL
SUM OF ALL RESPONSES TO PHQ QUESTIONS 1-9: 0
SUM OF ALL RESPONSES TO PHQ QUESTIONS 1-9: 0
2. FEELING DOWN, DEPRESSED OR HOPELESS: NOT AT ALL
SUM OF ALL RESPONSES TO PHQ QUESTIONS 1-9: 0